# Patient Record
Sex: FEMALE
[De-identification: names, ages, dates, MRNs, and addresses within clinical notes are randomized per-mention and may not be internally consistent; named-entity substitution may affect disease eponyms.]

---
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
## 2023-04-18 PROBLEM — M25.561 RIGHT KNEE PAIN: Status: RESOLVED | Noted: 2023-04-18 | Resolved: 2023-04-18

## 2023-04-18 PROBLEM — S83.511A SPRAIN OF ANTERIOR CRUCIATE LIGAMENT OF RIGHT KNEE: Status: RESOLVED | Noted: 2023-04-18 | Resolved: 2023-04-18

## 2023-04-18 PROBLEM — R10.2 PAIN, PELVIC, FEMALE: Status: ACTIVE | Noted: 2023-04-18

## 2023-04-18 PROBLEM — M25.661 STIFFNESS OF RIGHT KNEE, NOT ELSEWHERE CLASSIFIED: Status: RESOLVED | Noted: 2023-04-18 | Resolved: 2023-04-18

## 2023-04-18 PROBLEM — S83.421A SPRAIN OF LATERAL COLLATERAL LIGAMENT OF RIGHT KNEE: Status: RESOLVED | Noted: 2023-04-18 | Resolved: 2023-04-18

## 2023-04-18 PROBLEM — R20.0 NUMBNESS OF FINGERS: Status: RESOLVED | Noted: 2023-04-18 | Resolved: 2023-04-18

## 2023-04-18 PROBLEM — N92.1 MENORRHAGIA WITH IRREGULAR CYCLE: Status: ACTIVE | Noted: 2023-04-18

## 2023-04-18 PROBLEM — R31.9 HEMATURIA: Status: RESOLVED | Noted: 2023-04-18 | Resolved: 2023-04-18

## 2023-04-18 PROBLEM — R22.9 LUMP OF SKIN: Status: RESOLVED | Noted: 2023-04-18 | Resolved: 2023-04-18

## 2023-04-18 PROBLEM — R10.32 LEFT LOWER QUADRANT ABDOMINAL PAIN: Status: RESOLVED | Noted: 2023-04-18 | Resolved: 2023-04-18

## 2023-04-18 PROBLEM — K58.2 IRRITABLE BOWEL SYNDROME WITH MIXED BOWEL HABITS: Status: ACTIVE | Noted: 2023-04-18

## 2023-04-18 PROBLEM — L02.214 GROIN ABSCESS: Status: RESOLVED | Noted: 2023-04-18 | Resolved: 2023-04-18

## 2023-04-18 PROBLEM — R49.8 OTHER VOICE AND RESONANCE DISORDERS: Status: ACTIVE | Noted: 2023-04-18

## 2023-04-18 PROBLEM — M62.89 HIGH-TONE PELVIC FLOOR DYSFUNCTION: Status: ACTIVE | Noted: 2023-04-18

## 2023-04-18 PROBLEM — S60.222A CONTUSION OF LEFT HAND: Status: RESOLVED | Noted: 2023-04-18 | Resolved: 2023-04-18

## 2023-04-18 PROBLEM — S63.502S: Status: RESOLVED | Noted: 2023-04-18 | Resolved: 2023-04-18

## 2023-04-18 PROBLEM — S00.83XA FACIAL CONTUSION: Status: ACTIVE | Noted: 2023-04-18

## 2023-04-18 PROBLEM — M22.2X9 PATELLOFEMORAL SYNDROME: Status: ACTIVE | Noted: 2023-04-18

## 2023-04-18 PROBLEM — D25.9 FIBROID UTERUS: Status: ACTIVE | Noted: 2023-04-18

## 2023-04-18 PROBLEM — S63.92XS: Status: RESOLVED | Noted: 2023-04-18 | Resolved: 2023-04-18

## 2023-04-18 PROBLEM — G89.18 POST-OP PAIN: Status: RESOLVED | Noted: 2023-04-18 | Resolved: 2023-04-18

## 2023-04-18 PROBLEM — M25.50 JOINT PAIN: Status: ACTIVE | Noted: 2023-04-18

## 2023-04-18 PROBLEM — M23.261 OLD BUCKET HANDLE TEAR OF LATERAL MENISCUS OF RIGHT KNEE: Status: RESOLVED | Noted: 2023-04-18 | Resolved: 2023-04-18

## 2023-04-18 PROBLEM — D64.9 LOW HEMOGLOBIN: Status: ACTIVE | Noted: 2023-04-18

## 2023-04-18 PROBLEM — M25.532 LEFT WRIST PAIN: Status: RESOLVED | Noted: 2023-04-18 | Resolved: 2023-04-18

## 2023-04-18 PROBLEM — B37.9 YEAST INFECTION: Status: RESOLVED | Noted: 2023-04-18 | Resolved: 2023-04-18

## 2023-04-18 RX ORDER — ALBUTEROL SULFATE 0.83 MG/ML
2.5 SOLUTION RESPIRATORY (INHALATION) EVERY 6 HOURS PRN
COMMUNITY

## 2023-04-18 RX ORDER — NORELGESTROMIN AND ETHINYL ESTRADIOL 150; 35 UG/D; UG/D
PATCH TRANSDERMAL
COMMUNITY
Start: 2022-10-18 | End: 2024-05-13 | Stop reason: WASHOUT

## 2023-04-18 RX ORDER — EPINEPHRINE 0.3 MG/.3ML
INJECTION SUBCUTANEOUS
COMMUNITY
Start: 2022-02-28 | End: 2023-05-31 | Stop reason: SDUPTHER

## 2023-04-18 RX ORDER — CHOLECALCIFEROL (VITAMIN D3) 125 MCG
CAPSULE ORAL
COMMUNITY

## 2023-04-18 RX ORDER — MULTIVIT-MIN/IRON/FOLIC ACID/K 18-600-40
CAPSULE ORAL
COMMUNITY

## 2023-04-18 RX ORDER — ASCORBIC ACID 500 MG
1 TABLET ORAL DAILY
COMMUNITY
End: 2023-04-19 | Stop reason: ALTCHOICE

## 2023-04-18 RX ORDER — ECHINACEA 400 MG
CAPSULE ORAL
COMMUNITY

## 2023-04-18 RX ORDER — ZINC SULFATE 50(220)MG
CAPSULE ORAL DAILY
COMMUNITY

## 2023-04-18 RX ORDER — ALBUTEROL SULFATE 90 UG/1
2 AEROSOL, METERED RESPIRATORY (INHALATION) EVERY 6 HOURS
COMMUNITY
End: 2023-09-26 | Stop reason: SDUPTHER

## 2023-04-19 ENCOUNTER — OFFICE VISIT (OUTPATIENT)
Dept: PRIMARY CARE | Facility: CLINIC | Age: 23
End: 2023-04-19
Payer: COMMERCIAL

## 2023-04-19 VITALS
HEART RATE: 101 BPM | BODY MASS INDEX: 19.15 KG/M2 | DIASTOLIC BLOOD PRESSURE: 72 MMHG | HEIGHT: 67 IN | SYSTOLIC BLOOD PRESSURE: 116 MMHG | WEIGHT: 122 LBS

## 2023-04-19 DIAGNOSIS — R53.83 FATIGUE, UNSPECIFIED TYPE: ICD-10-CM

## 2023-04-19 DIAGNOSIS — D64.9 LOW HEMOGLOBIN: ICD-10-CM

## 2023-04-19 DIAGNOSIS — Z13.220 LIPID SCREENING: ICD-10-CM

## 2023-04-19 DIAGNOSIS — R63.1 POLYDIPSIA: Primary | ICD-10-CM

## 2023-04-19 LAB
POC APPEARANCE, URINE: CLEAR
POC BILIRUBIN, URINE: NEGATIVE
POC BLOOD, URINE: NEGATIVE
POC COLOR, URINE: YELLOW
POC GLUCOSE, URINE: NEGATIVE MG/DL
POC KETONES, URINE: NEGATIVE MG/DL
POC LEUKOCYTES, URINE: NEGATIVE
POC NITRITE,URINE: NEGATIVE
POC PH, URINE: 7 PH
POC PROTEIN, URINE: NEGATIVE MG/DL
POC SPECIFIC GRAVITY, URINE: 1.02
POC UROBILINOGEN, URINE: 0.2 EU/DL

## 2023-04-19 PROCEDURE — 99214 OFFICE O/P EST MOD 30 MIN: CPT | Performed by: INTERNAL MEDICINE

## 2023-04-19 PROCEDURE — 81003 URINALYSIS AUTO W/O SCOPE: CPT | Performed by: INTERNAL MEDICINE

## 2023-04-19 PROCEDURE — 1036F TOBACCO NON-USER: CPT | Performed by: INTERNAL MEDICINE

## 2023-04-19 ASSESSMENT — ENCOUNTER SYMPTOMS
EYE DISCHARGE: 0
NECK STIFFNESS: 0
WHEEZING: 0
NUMBNESS: 0
ALLERGIC/IMMUNOLOGIC NEGATIVE: 1
CARDIOVASCULAR NEGATIVE: 1
MUSCULOSKELETAL NEGATIVE: 1
COUGH: 0
VOMITING: 0
LIGHT-HEADEDNESS: 0
NAUSEA: 0
ABDOMINAL PAIN: 0
SHORTNESS OF BREATH: 0
DIARRHEA: 0
ADENOPATHY: 0
PALPITATIONS: 0
CHILLS: 0
HEADACHES: 0
EYES NEGATIVE: 1
CONFUSION: 0
NEUROLOGICAL NEGATIVE: 1
HEMATOLOGIC/LYMPHATIC NEGATIVE: 1
GASTROINTESTINAL NEGATIVE: 1
JOINT SWELLING: 0
BACK PAIN: 0
CONSTITUTIONAL NEGATIVE: 1
FEVER: 0
ENDOCRINE NEGATIVE: 1
ABDOMINAL DISTENTION: 0
RESPIRATORY NEGATIVE: 1
DYSURIA: 0
CONSTIPATION: 0
PSYCHIATRIC NEGATIVE: 1
AGITATION: 0

## 2023-04-19 ASSESSMENT — PATIENT HEALTH QUESTIONNAIRE - PHQ9
1. LITTLE INTEREST OR PLEASURE IN DOING THINGS: NOT AT ALL
2. FEELING DOWN, DEPRESSED OR HOPELESS: NOT AT ALL
SUM OF ALL RESPONSES TO PHQ9 QUESTIONS 1 AND 2: 0

## 2023-04-19 NOTE — PROGRESS NOTES
Subjective   Patient ID: Jessi Alvarado is a 22 y.o. female who presents for Follow-up (PT HERE WANTS TO DISCUSS BEING CHECKED FOR DIABETES).  C/O FREQUENCY, POLYDIPSIA, APPETITE NOT VERY GOOD, CO FATIGUE FOR A FEW YEARS  POSITIVE FH DM2        Review of Systems   Constitutional: Negative.  Negative for chills and fever.   HENT: Negative.  Negative for congestion.    Eyes: Negative.  Negative for discharge.   Respiratory: Negative.  Negative for cough, shortness of breath and wheezing.    Cardiovascular: Negative.  Negative for chest pain, palpitations and leg swelling.   Gastrointestinal: Negative.  Negative for abdominal distention, abdominal pain, constipation, diarrhea, nausea and vomiting.   Endocrine: Negative.    Genitourinary: Negative.  Negative for dysuria and urgency.   Musculoskeletal: Negative.  Negative for back pain, joint swelling and neck stiffness.   Skin: Negative.  Negative for rash.   Allergic/Immunologic: Negative.  Negative for immunocompromised state.   Neurological: Negative.  Negative for light-headedness, numbness and headaches.   Hematological: Negative.  Negative for adenopathy.   Psychiatric/Behavioral: Negative.  Negative for agitation, behavioral problems and confusion.    All other systems reviewed and are negative.      Objective   Physical Exam  Vitals reviewed.   Constitutional:       General: She is not in acute distress.     Appearance: Normal appearance.   HENT:      Head: Normocephalic and atraumatic.      Nose: Nose normal.   Eyes:      Conjunctiva/sclera: Conjunctivae normal.      Pupils: Pupils are equal, round, and reactive to light.   Neck:      Vascular: No carotid bruit.   Cardiovascular:      Rate and Rhythm: Normal rate and regular rhythm.      Pulses: Normal pulses.      Heart sounds:      No gallop.   Pulmonary:      Effort: Pulmonary effort is normal. No respiratory distress.      Breath sounds: Normal breath sounds. No wheezing.   Abdominal:      General:  "Bowel sounds are normal.      Palpations: Abdomen is soft.      Tenderness: There is no abdominal tenderness.   Musculoskeletal:         General: Normal range of motion.      Cervical back: Normal range of motion. No rigidity.   Lymphadenopathy:      Cervical: No cervical adenopathy.   Skin:     General: Skin is warm.      Findings: No rash.   Neurological:      General: No focal deficit present.      Mental Status: She is alert and oriented to person, place, and time.   Psychiatric:         Mood and Affect: Mood normal.         Behavior: Behavior normal.     /72 (BP Location: Right arm, Patient Position: Sitting)   Pulse 101   Ht 1.702 m (5' 7\")   Wt 55.3 kg (122 lb)   BMI 19.11 kg/m²    Hemoglobin A1C   Date/Time Value Ref Range Status   09/08/2022 07:10 AM 5.3 % Final     Comment:          Diagnosis of Diabetes-Adults   Non-Diabetic: < or = 5.6%   Increased risk for developing diabetes: 5.7-6.4%   Diagnostic of diabetes: > or = 6.5%  .       Monitoring of Diabetes                Age (y)     Therapeutic Goal (%)   Adults:          >18           <7.0   Pediatrics:    13-18           <7.5                   7-12           <8.0                   0- 6            7.5-8.5   American Diabetes Association. Diabetes Care 33(S1), Jan 2010.       Assessment/Plan   Problem List Items Addressed This Visit    None  Visit Diagnoses       Polydipsia    -  Primary    Relevant Orders    POCT UA Automated manually resulted    Comprehensive Metabolic Panel    CBC and Auto Differential    Folate    Vitamin B12    Vitamin D, Total    Hemoglobin A1C    TSH with reflex to Free T4 if abnormal    Fatigue, unspecified type        Relevant Orders    POCT UA Automated manually resulted    Comprehensive Metabolic Panel    CBC and Auto Differential    Folate    Vitamin B12    Vitamin D, Total    Hemoglobin A1C    TSH with reflex to Free T4 if abnormal             I have counselled patient about need for smoking/tobacco cessation and " how I can support efforts when patient is ready to quit.  Discussed nicotine replacement therapy, Varenicline, Bupropion, hypnosis, support groups, and acupuncture as potential options.  I spend 4 minutes counseling.    SEE A PSCHY AND COUNS.      UA NEGATIVE    MDM    1) COMPLEXITY: 1 UNDIAGNOSED NEW PROBLEM WITH UNCERTAIN PROGNOSIS  2)DATA: TESTS INTERPRETED AND OR ORDERED, TOOK INDEPENDENT HISTORY OR RECORDS REVIEWED  3)RISK: MODERATE RISK DUE TO NATURE OF MEDICAL CONDITIONS/COMORBIDITY OR MEDICATIONS ORDERED OR SURGICAL OR PROCEDURE REFERRAL, .    FU 2 W BW

## 2023-05-10 ENCOUNTER — APPOINTMENT (OUTPATIENT)
Dept: PRIMARY CARE | Facility: CLINIC | Age: 23
End: 2023-05-10
Payer: COMMERCIAL

## 2023-05-31 ENCOUNTER — OFFICE VISIT (OUTPATIENT)
Dept: PRIMARY CARE | Facility: CLINIC | Age: 23
End: 2023-05-31
Payer: COMMERCIAL

## 2023-05-31 VITALS
HEART RATE: 90 BPM | SYSTOLIC BLOOD PRESSURE: 104 MMHG | OXYGEN SATURATION: 97 % | BODY MASS INDEX: 18.95 KG/M2 | WEIGHT: 121 LBS | DIASTOLIC BLOOD PRESSURE: 69 MMHG

## 2023-05-31 DIAGNOSIS — Z00.00 HEALTH CARE MAINTENANCE: Primary | ICD-10-CM

## 2023-05-31 DIAGNOSIS — R10.32 LLQ PAIN: ICD-10-CM

## 2023-05-31 PROBLEM — J45.20 MILD INTERMITTENT ASTHMA WITHOUT COMPLICATION (HHS-HCC): Status: ACTIVE | Noted: 2020-07-13

## 2023-05-31 PROBLEM — J38.3 VOCAL CORD DYSFUNCTION: Status: ACTIVE | Noted: 2020-07-13

## 2023-05-31 PROBLEM — M25.551 RIGHT HIP PAIN: Status: ACTIVE | Noted: 2018-12-06

## 2023-05-31 PROBLEM — J45.40 MODERATE PERSISTENT ASTHMA WITHOUT COMPLICATION (HHS-HCC): Status: ACTIVE | Noted: 2020-01-13

## 2023-05-31 PROBLEM — M23.91 LOCKING OF RIGHT KNEE: Status: ACTIVE | Noted: 2018-01-04

## 2023-05-31 PROBLEM — F07.81 POST CONCUSSION SYNDROME: Status: ACTIVE | Noted: 2017-12-13

## 2023-05-31 PROBLEM — M54.41 ACUTE RIGHT-SIDED LOW BACK PAIN WITH RIGHT-SIDED SCIATICA: Status: ACTIVE | Noted: 2018-12-06

## 2023-05-31 PROBLEM — R41.89 COGNITIVE CHANGES: Status: ACTIVE | Noted: 2018-05-31

## 2023-05-31 PROBLEM — M25.562 LEFT KNEE PAIN: Status: ACTIVE | Noted: 2017-08-15

## 2023-05-31 LAB
POC APPEARANCE, URINE: CLEAR
POC BILIRUBIN, URINE: NEGATIVE
POC BLOOD, URINE: NEGATIVE
POC COLOR, URINE: YELLOW
POC GLUCOSE, URINE: NEGATIVE MG/DL
POC KETONES, URINE: NEGATIVE MG/DL
POC LEUKOCYTES, URINE: NEGATIVE
POC NITRITE,URINE: NEGATIVE
POC PH, URINE: 7.5 PH
POC PROTEIN, URINE: NEGATIVE MG/DL
POC SPECIFIC GRAVITY, URINE: 1.02
POC UROBILINOGEN, URINE: 0.2 EU/DL
PREGNANCY TEST URINE, POC: NEGATIVE

## 2023-05-31 PROCEDURE — 99215 OFFICE O/P EST HI 40 MIN: CPT | Performed by: INTERNAL MEDICINE

## 2023-05-31 PROCEDURE — 1036F TOBACCO NON-USER: CPT | Performed by: INTERNAL MEDICINE

## 2023-05-31 PROCEDURE — 81025 URINE PREGNANCY TEST: CPT | Performed by: INTERNAL MEDICINE

## 2023-05-31 PROCEDURE — 81003 URINALYSIS AUTO W/O SCOPE: CPT | Performed by: INTERNAL MEDICINE

## 2023-05-31 RX ORDER — EPINEPHRINE 0.3 MG/.3ML
INJECTION SUBCUTANEOUS
Qty: 1 EACH | Refills: 0 | Status: SHIPPED | OUTPATIENT
Start: 2023-05-31

## 2023-05-31 ASSESSMENT — PATIENT HEALTH QUESTIONNAIRE - PHQ9
1. LITTLE INTEREST OR PLEASURE IN DOING THINGS: NOT AT ALL
SUM OF ALL RESPONSES TO PHQ9 QUESTIONS 1 AND 2: 0
2. FEELING DOWN, DEPRESSED OR HOPELESS: NOT AT ALL

## 2023-05-31 ASSESSMENT — ENCOUNTER SYMPTOMS
CHILLS: 0
NUMBNESS: 0
CONSTITUTIONAL NEGATIVE: 1
DYSURIA: 0
NAUSEA: 1
EYES NEGATIVE: 1
PALPITATIONS: 0
EYE DISCHARGE: 0
ALLERGIC/IMMUNOLOGIC NEGATIVE: 1
ABDOMINAL PAIN: 1
BACK PAIN: 0
DIARRHEA: 0
HEMATOLOGIC/LYMPHATIC NEGATIVE: 1
ABDOMINAL DISTENTION: 0
ADENOPATHY: 0
VOMITING: 0
NECK STIFFNESS: 0
AGITATION: 0
RESPIRATORY NEGATIVE: 1
WHEEZING: 0
ENDOCRINE NEGATIVE: 1
HEADACHES: 0
SHORTNESS OF BREATH: 0
COUGH: 0
NEUROLOGICAL NEGATIVE: 1
CONFUSION: 0
PSYCHIATRIC NEGATIVE: 1
MUSCULOSKELETAL NEGATIVE: 1
LIGHT-HEADEDNESS: 0
FEVER: 0
BLOOD IN STOOL: 0
CONSTIPATION: 0
CARDIOVASCULAR NEGATIVE: 1
JOINT SWELLING: 0

## 2023-05-31 NOTE — PROGRESS NOTES
Subjective   Patient ID: Jessi Alvarado is a 22 y.o. female who presents for PAIN IN ABD (X FEW DAYS) and Urinary Frequency.  PT C/O LLQ PAIN, LAST PERIOD LAST MO, THE LAST PATCH WAS LAST , HAS NOT USED FOR THE LAST MO,  FOR 3 DAYS  SEVERITY: MODERATE  CHARACTERISTIC: ACUTE  EXACERBATION FACTOR: NONE  RELIEVING FACTOR: NONE  ASSOCIATED SYMPTOMS: NAUSEA NO CONSTIPATION NO DIARRHEA, NO VAGINAL DISCHARGE OR BLEEEDING  PRIOR TX: IBUPROFEN          Review of Systems   Constitutional: Negative.  Negative for chills and fever.   HENT: Negative.  Negative for congestion.    Eyes: Negative.  Negative for discharge.   Respiratory: Negative.  Negative for cough, shortness of breath and wheezing.    Cardiovascular: Negative.  Negative for chest pain, palpitations and leg swelling.   Gastrointestinal:  Positive for abdominal pain and nausea. Negative for abdominal distention, blood in stool, constipation, diarrhea and vomiting.   Endocrine: Negative.    Genitourinary: Negative.  Negative for dysuria and urgency.   Musculoskeletal: Negative.  Negative for back pain, joint swelling and neck stiffness.   Skin: Negative.  Negative for rash.   Allergic/Immunologic: Negative.  Negative for immunocompromised state.   Neurological: Negative.  Negative for light-headedness, numbness and headaches.   Hematological: Negative.  Negative for adenopathy.   Psychiatric/Behavioral: Negative.  Negative for agitation, behavioral problems and confusion.    All other systems reviewed and are negative.      Objective   Physical Exam  Vitals reviewed.   Constitutional:       General: She is not in acute distress.     Appearance: Normal appearance.   HENT:      Head: Normocephalic and atraumatic.      Nose: Nose normal.   Eyes:      Conjunctiva/sclera: Conjunctivae normal.      Pupils: Pupils are equal, round, and reactive to light.   Neck:      Vascular: No carotid bruit.   Cardiovascular:      Rate and Rhythm: Normal rate and regular rhythm.       Pulses: Normal pulses.      Heart sounds:      No gallop.   Pulmonary:      Effort: Pulmonary effort is normal. No respiratory distress.      Breath sounds: Normal breath sounds. No wheezing.   Abdominal:      General: Bowel sounds are normal.      Palpations: Abdomen is soft.      Tenderness: There is no abdominal tenderness.   Musculoskeletal:         General: Normal range of motion.      Cervical back: Normal range of motion. No rigidity.   Lymphadenopathy:      Cervical: No cervical adenopathy.   Skin:     General: Skin is warm.      Findings: No rash.   Neurological:      General: No focal deficit present.      Mental Status: She is alert and oriented to person, place, and time.   Psychiatric:         Mood and Affect: Mood normal.         Behavior: Behavior normal.       /69 (BP Location: Right arm)   Pulse 90   Wt 54.9 kg (121 lb)   LMP  (LMP Unknown)   SpO2 97%   BMI 18.95 kg/m²    Hemoglobin A1C   Date/Time Value Ref Range Status   09/08/2022 07:10 AM 5.3 % Final     Comment:          Diagnosis of Diabetes-Adults   Non-Diabetic: < or = 5.6%   Increased risk for developing diabetes: 5.7-6.4%   Diagnostic of diabetes: > or = 6.5%  .       Monitoring of Diabetes                Age (y)     Therapeutic Goal (%)   Adults:          >18           <7.0   Pediatrics:    13-18           <7.5                   7-12           <8.0                   0- 6            7.5-8.5   American Diabetes Association. Diabetes Care 33(S1), Jan 2010.       Assessment/Plan   Problem List Items Addressed This Visit    None  Visit Diagnoses       Health care maintenance    -  Primary    Relevant Medications    EPINEPHrine 0.3 mg/0.3 mL injection syringe    LLQ pain        Relevant Orders    POCT UA Automated manually resulted    POCT pregnancy, urine manually resulted             UA NEGATIVE    REFERRED TO er FOR EVAL    Case dw withED physician    MDM    1) COMPLEXITY: 1 ACUTE OR CHRONIC ILLNESS OR INJURY THAT POSES  THREAT TO LIFE OR BODILY FUNCTION.  2)DATA: TESTS INTERPRETED AND OR ORDERED, TOOK INDEPENDENT HISTORY OR RECORDS REVIEWED, CASE DISCUSSED WITH ANOTHER PROVIDER  3)RISK: HIGH RISK DUE TO NATURE OF MEDICAL CONDITIONS/COMORBIDITY OR MEDICATIONS ORDERED OR SURGICAL OR PROCEDURE REFERRAL, OR REFERRED TO HOSPITAL .

## 2023-07-07 LAB
CHLAMYDIA TRACH., AMPLIFIED: NEGATIVE
N. GONORRHEA, AMPLIFIED: NEGATIVE

## 2023-07-26 PROBLEM — S06.0X0A CONCUSSION WITH NO LOSS OF CONSCIOUSNESS: Status: RESOLVED | Noted: 2018-03-02 | Resolved: 2023-07-26

## 2023-07-31 ENCOUNTER — OFFICE VISIT (OUTPATIENT)
Dept: PRIMARY CARE | Facility: CLINIC | Age: 23
End: 2023-07-31
Payer: COMMERCIAL

## 2023-07-31 VITALS
DIASTOLIC BLOOD PRESSURE: 64 MMHG | HEART RATE: 94 BPM | WEIGHT: 118 LBS | HEIGHT: 67 IN | SYSTOLIC BLOOD PRESSURE: 100 MMHG | BODY MASS INDEX: 18.52 KG/M2

## 2023-07-31 DIAGNOSIS — J45.20 MILD INTERMITTENT ASTHMA WITHOUT COMPLICATION (HHS-HCC): Primary | ICD-10-CM

## 2023-07-31 PROCEDURE — 99213 OFFICE O/P EST LOW 20 MIN: CPT | Performed by: INTERNAL MEDICINE

## 2023-07-31 PROCEDURE — 1036F TOBACCO NON-USER: CPT | Performed by: INTERNAL MEDICINE

## 2023-07-31 ASSESSMENT — ENCOUNTER SYMPTOMS
CHILLS: 0
JOINT SWELLING: 0
VOMITING: 0
RESPIRATORY NEGATIVE: 1
NAUSEA: 0
COUGH: 0
NUMBNESS: 0
ADENOPATHY: 0
SHORTNESS OF BREATH: 0
WHEEZING: 0
BACK PAIN: 0
CONSTITUTIONAL NEGATIVE: 1
GASTROINTESTINAL NEGATIVE: 1
DYSURIA: 0
HEMATOLOGIC/LYMPHATIC NEGATIVE: 1
DIARRHEA: 0
NEUROLOGICAL NEGATIVE: 1
EYE DISCHARGE: 0
CONFUSION: 0
LIGHT-HEADEDNESS: 0
CONSTIPATION: 0
PSYCHIATRIC NEGATIVE: 1
NECK STIFFNESS: 0
ABDOMINAL PAIN: 0
HEADACHES: 0
AGITATION: 0
FEVER: 0
EYES NEGATIVE: 1
MUSCULOSKELETAL NEGATIVE: 1
ABDOMINAL DISTENTION: 0
ALLERGIC/IMMUNOLOGIC NEGATIVE: 1
PALPITATIONS: 0
CARDIOVASCULAR NEGATIVE: 1
ENDOCRINE NEGATIVE: 1

## 2023-07-31 NOTE — PROGRESS NOTES
Subjective   Patient ID: Jessi Alvarado is a 23 y.o. female who presents for Follow-up (6 MO FU NO LAB DONE).  FU 6 MO        Review of Systems   Constitutional: Negative.  Negative for chills and fever.   HENT: Negative.  Negative for congestion.    Eyes: Negative.  Negative for discharge.   Respiratory: Negative.  Negative for cough, shortness of breath and wheezing.    Cardiovascular: Negative.  Negative for chest pain, palpitations and leg swelling.   Gastrointestinal: Negative.  Negative for abdominal distention, abdominal pain, constipation, diarrhea, nausea and vomiting.   Endocrine: Negative.    Genitourinary: Negative.  Negative for dysuria and urgency.   Musculoskeletal: Negative.  Negative for back pain, joint swelling and neck stiffness.   Skin: Negative.  Negative for rash.   Allergic/Immunologic: Negative.  Negative for immunocompromised state.   Neurological: Negative.  Negative for light-headedness, numbness and headaches.   Hematological: Negative.  Negative for adenopathy.   Psychiatric/Behavioral: Negative.  Negative for agitation, behavioral problems and confusion.    All other systems reviewed and are negative.      Objective   Physical Exam  Vitals reviewed.   Constitutional:       General: She is not in acute distress.     Appearance: Normal appearance.   HENT:      Head: Normocephalic and atraumatic.      Nose: Nose normal.   Eyes:      Conjunctiva/sclera: Conjunctivae normal.      Pupils: Pupils are equal, round, and reactive to light.   Neck:      Vascular: No carotid bruit.   Cardiovascular:      Rate and Rhythm: Normal rate and regular rhythm.      Pulses: Normal pulses.      Heart sounds:      No gallop.   Pulmonary:      Effort: Pulmonary effort is normal. No respiratory distress.      Breath sounds: Normal breath sounds. No wheezing.   Abdominal:      General: Bowel sounds are normal.      Palpations: Abdomen is soft.      Tenderness: There is no abdominal tenderness.  "  Musculoskeletal:         General: Normal range of motion.      Cervical back: Normal range of motion. No rigidity.   Lymphadenopathy:      Cervical: No cervical adenopathy.   Skin:     General: Skin is warm.      Findings: No rash.   Neurological:      General: No focal deficit present.      Mental Status: She is alert and oriented to person, place, and time.   Psychiatric:         Mood and Affect: Mood normal.         Behavior: Behavior normal.       /64 (BP Location: Left arm, Patient Position: Sitting)   Pulse 94   Ht 1.702 m (5' 7\")   Wt 53.5 kg (118 lb)   BMI 18.48 kg/m²    Hemoglobin A1C   Date/Time Value Ref Range Status   09/08/2022 07:10 AM 5.3 % Final     Comment:          Diagnosis of Diabetes-Adults   Non-Diabetic: < or = 5.6%   Increased risk for developing diabetes: 5.7-6.4%   Diagnostic of diabetes: > or = 6.5%  .       Monitoring of Diabetes                Age (y)     Therapeutic Goal (%)   Adults:          >18           <7.0   Pediatrics:    13-18           <7.5                   7-12           <8.0                   0- 6            7.5-8.5   American Diabetes Association. Diabetes Care 33(S1), Jan 2010.       Assessment/Plan   Problem List Items Addressed This Visit       Mild intermittent asthma without complication - Primary    Relevant Orders    CBC and Auto Differential    Comprehensive Metabolic Panel        CLINICALLY DOING FINE    FU 6 MO BW  "

## 2023-08-02 ENCOUNTER — APPOINTMENT (OUTPATIENT)
Dept: PRIMARY CARE | Facility: CLINIC | Age: 23
End: 2023-08-02
Payer: COMMERCIAL

## 2023-09-08 ENCOUNTER — LAB (OUTPATIENT)
Dept: LAB | Facility: LAB | Age: 23
End: 2023-09-08
Payer: COMMERCIAL

## 2023-09-08 DIAGNOSIS — R53.83 FATIGUE, UNSPECIFIED TYPE: ICD-10-CM

## 2023-09-08 DIAGNOSIS — Z13.220 LIPID SCREENING: ICD-10-CM

## 2023-09-08 DIAGNOSIS — R63.1 POLYDIPSIA: ICD-10-CM

## 2023-09-08 DIAGNOSIS — J45.20 MILD INTERMITTENT ASTHMA WITHOUT COMPLICATION (HHS-HCC): ICD-10-CM

## 2023-09-08 DIAGNOSIS — D64.9 LOW HEMOGLOBIN: ICD-10-CM

## 2023-09-08 LAB
ALANINE AMINOTRANSFERASE (SGPT) (U/L) IN SER/PLAS: 9 U/L (ref 7–45)
ALBUMIN (G/DL) IN SER/PLAS: 4.6 G/DL (ref 3.4–5)
ALKALINE PHOSPHATASE (U/L) IN SER/PLAS: 42 U/L (ref 33–110)
ANION GAP IN SER/PLAS: 11 MMOL/L (ref 10–20)
ASPARTATE AMINOTRANSFERASE (SGOT) (U/L) IN SER/PLAS: 14 U/L (ref 9–39)
BASOPHILS (10*3/UL) IN BLOOD BY AUTOMATED COUNT: 0.01 X10E9/L (ref 0–0.1)
BASOPHILS/100 LEUKOCYTES IN BLOOD BY AUTOMATED COUNT: 0.2 % (ref 0–2)
BILIRUBIN TOTAL (MG/DL) IN SER/PLAS: 0.8 MG/DL (ref 0–1.2)
CALCIDIOL (25 OH VITAMIN D3) (NG/ML) IN SER/PLAS: 21 NG/ML
CALCIUM (MG/DL) IN SER/PLAS: 9.4 MG/DL (ref 8.6–10.3)
CARBON DIOXIDE, TOTAL (MMOL/L) IN SER/PLAS: 25 MMOL/L (ref 21–32)
CHLAMYDIA TRACH., AMPLIFIED: NEGATIVE
CHLORIDE (MMOL/L) IN SER/PLAS: 108 MMOL/L (ref 98–107)
CHOLESTEROL (MG/DL) IN SER/PLAS: 123 MG/DL (ref 0–199)
CHOLESTEROL IN HDL (MG/DL) IN SER/PLAS: 57 MG/DL
CHOLESTEROL/HDL RATIO: 2.2
COBALAMIN (VITAMIN B12) (PG/ML) IN SER/PLAS: 538 PG/ML (ref 211–911)
CREATININE (MG/DL) IN SER/PLAS: 0.74 MG/DL (ref 0.5–1.05)
EOSINOPHILS (10*3/UL) IN BLOOD BY AUTOMATED COUNT: 0.04 X10E9/L (ref 0–0.7)
EOSINOPHILS/100 LEUKOCYTES IN BLOOD BY AUTOMATED COUNT: 0.9 % (ref 0–6)
ERYTHROCYTE DISTRIBUTION WIDTH (RATIO) BY AUTOMATED COUNT: 11.6 % (ref 11.5–14.5)
ERYTHROCYTE MEAN CORPUSCULAR HEMOGLOBIN CONCENTRATION (G/DL) BY AUTOMATED: 33.1 G/DL (ref 32–36)
ERYTHROCYTE MEAN CORPUSCULAR VOLUME (FL) BY AUTOMATED COUNT: 94 FL (ref 80–100)
ERYTHROCYTES (10*6/UL) IN BLOOD BY AUTOMATED COUNT: 4.18 X10E12/L (ref 4–5.2)
ESTIMATED AVERAGE GLUCOSE FOR HBA1C: 108 MG/DL
FERRITIN (UG/LL) IN SER/PLAS: 64 UG/L (ref 8–150)
FOLATE (NG/ML) IN SER/PLAS: 10.3 NG/ML
GFR FEMALE: >90 ML/MIN/1.73M2
GLUCOSE (MG/DL) IN SER/PLAS: 83 MG/DL (ref 74–99)
HEMATOCRIT (%) IN BLOOD BY AUTOMATED COUNT: 39.3 % (ref 36–46)
HEMOGLOBIN (G/DL) IN BLOOD: 13 G/DL (ref 12–16)
HEMOGLOBIN A1C/HEMOGLOBIN TOTAL IN BLOOD: 5.4 %
IMMATURE GRANULOCYTES/100 LEUKOCYTES IN BLOOD BY AUTOMATED COUNT: 0 % (ref 0–0.9)
LDL: 58 MG/DL (ref 0–119)
LEUKOCYTES (10*3/UL) IN BLOOD BY AUTOMATED COUNT: 4.6 X10E9/L (ref 4.4–11.3)
LYMPHOCYTES (10*3/UL) IN BLOOD BY AUTOMATED COUNT: 1.39 X10E9/L (ref 1.2–4.8)
LYMPHOCYTES/100 LEUKOCYTES IN BLOOD BY AUTOMATED COUNT: 30.1 % (ref 13–44)
MONOCYTES (10*3/UL) IN BLOOD BY AUTOMATED COUNT: 0.38 X10E9/L (ref 0.1–1)
MONOCYTES/100 LEUKOCYTES IN BLOOD BY AUTOMATED COUNT: 8.2 % (ref 2–10)
N. GONORRHEA, AMPLIFIED: NEGATIVE
NEUTROPHILS (10*3/UL) IN BLOOD BY AUTOMATED COUNT: 2.8 X10E9/L (ref 1.2–7.7)
NEUTROPHILS/100 LEUKOCYTES IN BLOOD BY AUTOMATED COUNT: 60.6 % (ref 40–80)
NON HDL CHOLESTEROL: 66 MG/DL (ref 0–149)
PLATELETS (10*3/UL) IN BLOOD AUTOMATED COUNT: 223 X10E9/L (ref 150–450)
POTASSIUM (MMOL/L) IN SER/PLAS: 3.8 MMOL/L (ref 3.5–5.3)
PROTEIN TOTAL: 6.7 G/DL (ref 6.4–8.2)
SODIUM (MMOL/L) IN SER/PLAS: 140 MMOL/L (ref 136–145)
THYROTROPIN (MIU/L) IN SER/PLAS BY DETECTION LIMIT <= 0.05 MIU/L: 2.06 MIU/L (ref 0.44–3.98)
TRIGLYCERIDE (MG/DL) IN SER/PLAS: 39 MG/DL (ref 0–149)
UREA NITROGEN (MG/DL) IN SER/PLAS: 9 MG/DL (ref 6–23)
VLDL: 8 MG/DL (ref 0–40)

## 2023-09-08 PROCEDURE — 85025 COMPLETE CBC W/AUTO DIFF WBC: CPT

## 2023-09-08 PROCEDURE — 82746 ASSAY OF FOLIC ACID SERUM: CPT

## 2023-09-08 PROCEDURE — 82306 VITAMIN D 25 HYDROXY: CPT

## 2023-09-08 PROCEDURE — 80061 LIPID PANEL: CPT

## 2023-09-08 PROCEDURE — 80053 COMPREHEN METABOLIC PANEL: CPT

## 2023-09-08 PROCEDURE — 82728 ASSAY OF FERRITIN: CPT

## 2023-09-08 PROCEDURE — 83036 HEMOGLOBIN GLYCOSYLATED A1C: CPT

## 2023-09-08 PROCEDURE — 84443 ASSAY THYROID STIM HORMONE: CPT

## 2023-09-08 PROCEDURE — 36415 COLL VENOUS BLD VENIPUNCTURE: CPT

## 2023-09-08 PROCEDURE — 82607 VITAMIN B-12: CPT

## 2023-09-09 LAB
HIV 1/ 2 AG/AB SCREEN: NONREACTIVE
SYPHILIS TOTAL AB: NONREACTIVE

## 2023-09-26 ENCOUNTER — TELEPHONE (OUTPATIENT)
Dept: PRIMARY CARE | Facility: CLINIC | Age: 23
End: 2023-09-26

## 2023-09-26 ENCOUNTER — OFFICE VISIT (OUTPATIENT)
Dept: PRIMARY CARE | Facility: CLINIC | Age: 23
End: 2023-09-26
Payer: COMMERCIAL

## 2023-09-26 VITALS
DIASTOLIC BLOOD PRESSURE: 48 MMHG | BODY MASS INDEX: 18.36 KG/M2 | SYSTOLIC BLOOD PRESSURE: 108 MMHG | WEIGHT: 117 LBS | HEIGHT: 67 IN | HEART RATE: 84 BPM

## 2023-09-26 DIAGNOSIS — R10.12 ABDOMINAL PAIN, LUQ: ICD-10-CM

## 2023-09-26 DIAGNOSIS — R10.33 ABDOMINAL PAIN, PERIUMBILICAL: ICD-10-CM

## 2023-09-26 DIAGNOSIS — R10.13 ABDOMINAL PAIN, EPIGASTRIC: Primary | ICD-10-CM

## 2023-09-26 DIAGNOSIS — E55.9 VITAMIN D DEFICIENCY: ICD-10-CM

## 2023-09-26 DIAGNOSIS — R53.82 CHRONIC FATIGUE: ICD-10-CM

## 2023-09-26 DIAGNOSIS — J45.20 MILD INTERMITTENT ASTHMA, UNSPECIFIED WHETHER COMPLICATED (HHS-HCC): ICD-10-CM

## 2023-09-26 DIAGNOSIS — K52.9 CHRONIC DIARRHEA: ICD-10-CM

## 2023-09-26 DIAGNOSIS — E87.6 HYPOKALEMIA: ICD-10-CM

## 2023-09-26 PROBLEM — S83.289A ACUTE LATERAL MENISCAL TEAR: Status: ACTIVE | Noted: 2023-09-26

## 2023-09-26 PROBLEM — I45.10 INCOMPLETE RBBB: Status: ACTIVE | Noted: 2023-09-26

## 2023-09-26 PROBLEM — S62.111A CLOSED CHIP FRACTURE OF TRIQUETRAL BONE OF RIGHT WRIST: Status: ACTIVE | Noted: 2023-09-26

## 2023-09-26 PROBLEM — R30.0 DYSURIA: Status: ACTIVE | Noted: 2023-09-26

## 2023-09-26 PROBLEM — R93.5 ABNORMAL CT SCAN, PELVIS: Status: ACTIVE | Noted: 2023-09-26

## 2023-09-26 PROBLEM — L70.0 ACNE VULGARIS: Status: ACTIVE | Noted: 2018-11-29

## 2023-09-26 PROBLEM — I51.7 LEFT ATRIAL ENLARGEMENT: Status: ACTIVE | Noted: 2023-09-26

## 2023-09-26 PROBLEM — M25.522 LEFT ELBOW PAIN: Status: ACTIVE | Noted: 2023-09-26

## 2023-09-26 PROBLEM — I34.1 MVP (MITRAL VALVE PROLAPSE): Status: ACTIVE | Noted: 2023-09-26

## 2023-09-26 PROBLEM — M79.603 ARM PAIN: Status: ACTIVE | Noted: 2023-09-26

## 2023-09-26 PROCEDURE — 1036F TOBACCO NON-USER: CPT | Performed by: INTERNAL MEDICINE

## 2023-09-26 PROCEDURE — 99214 OFFICE O/P EST MOD 30 MIN: CPT | Performed by: INTERNAL MEDICINE

## 2023-09-26 RX ORDER — AZITHROMYCIN 250 MG/1
TABLET, FILM COATED ORAL
COMMUNITY
Start: 2023-08-01 | End: 2023-09-26 | Stop reason: ALTCHOICE

## 2023-09-26 RX ORDER — AMOXICILLIN AND CLAVULANATE POTASSIUM 875; 125 MG/1; MG/1
TABLET, FILM COATED ORAL
COMMUNITY
Start: 2023-09-22 | End: 2023-10-19 | Stop reason: ALTCHOICE

## 2023-09-26 RX ORDER — DICLOFENAC SODIUM 100 MG/1
1 TABLET, EXTENDED RELEASE ORAL DAILY
COMMUNITY
Start: 2023-05-02 | End: 2023-11-14 | Stop reason: ALTCHOICE

## 2023-09-26 RX ORDER — OMEPRAZOLE 40 MG/1
40 CAPSULE, DELAYED RELEASE ORAL
Qty: 30 CAPSULE | Refills: 5 | Status: SHIPPED | OUTPATIENT
Start: 2023-09-26 | End: 2023-11-14 | Stop reason: ALTCHOICE

## 2023-09-26 RX ORDER — OXYCODONE AND ACETAMINOPHEN 5; 325 MG/1; MG/1
TABLET ORAL
COMMUNITY
Start: 2023-08-27 | End: 2023-09-26 | Stop reason: ALTCHOICE

## 2023-09-26 RX ORDER — NITROFURANTOIN 25; 75 MG/1; MG/1
CAPSULE ORAL
COMMUNITY
Start: 2023-08-27 | End: 2023-09-26 | Stop reason: ALTCHOICE

## 2023-09-26 RX ORDER — MINOCYCLINE HYDROCHLORIDE 50 MG/1
CAPSULE ORAL
COMMUNITY
Start: 2018-11-29 | End: 2023-11-14 | Stop reason: ALTCHOICE

## 2023-09-26 RX ORDER — FAMOTIDINE 20 MG/1
20 TABLET, FILM COATED ORAL 2 TIMES DAILY
Qty: 60 TABLET | Refills: 5 | Status: SHIPPED | OUTPATIENT
Start: 2023-09-26 | End: 2024-03-24

## 2023-09-26 RX ORDER — ONDANSETRON 4 MG/1
TABLET, ORALLY DISINTEGRATING ORAL
COMMUNITY
Start: 2023-09-24 | End: 2024-01-03

## 2023-09-26 RX ORDER — ALBUTEROL SULFATE 90 UG/1
2 AEROSOL, METERED RESPIRATORY (INHALATION) EVERY 4 HOURS PRN
Qty: 18 G | Refills: 11 | Status: SHIPPED | OUTPATIENT
Start: 2023-09-26 | End: 2024-02-15 | Stop reason: SDUPTHER

## 2023-09-26 RX ORDER — FAMOTIDINE 20 MG/1
20 TABLET, FILM COATED ORAL 2 TIMES DAILY
Qty: 60 TABLET | Refills: 5 | Status: SHIPPED | OUTPATIENT
Start: 2023-09-26 | End: 2023-09-26 | Stop reason: SDUPTHER

## 2023-09-26 RX ORDER — IBUPROFEN 400 MG/1
TABLET ORAL
COMMUNITY
Start: 2023-08-12 | End: 2023-09-26 | Stop reason: ALTCHOICE

## 2023-09-26 RX ORDER — OMEPRAZOLE 40 MG/1
40 CAPSULE, DELAYED RELEASE ORAL
Qty: 30 CAPSULE | Refills: 0 | Status: SHIPPED | OUTPATIENT
Start: 2023-09-26 | End: 2023-09-26 | Stop reason: SDUPTHER

## 2023-09-26 RX ORDER — NEOMYCIN SULFATE, POLYMYXIN B SULFATE, HYDROCORTISONE 3.5; 10000; 1 MG/ML; [USP'U]/ML; MG/ML
SOLUTION/ DROPS AURICULAR (OTIC)
COMMUNITY
Start: 2023-08-12 | End: 2024-05-13 | Stop reason: WASHOUT

## 2023-09-26 RX ORDER — ERGOCALCIFEROL 1.25 MG/1
50000 CAPSULE ORAL
Qty: 4 CAPSULE | Refills: 11 | Status: SHIPPED | OUTPATIENT
Start: 2023-09-26 | End: 2024-01-18 | Stop reason: WASHOUT

## 2023-09-26 ASSESSMENT — ENCOUNTER SYMPTOMS
CARDIOVASCULAR NEGATIVE: 1
NAUSEA: 0
NEUROLOGICAL NEGATIVE: 1
ABDOMINAL PAIN: 1
FEVER: 0
PALPITATIONS: 0
WEAKNESS: 0
DYSURIA: 0
LIGHT-HEADEDNESS: 0
DIARRHEA: 1
CHILLS: 0
FATIGUE: 1
MUSCULOSKELETAL NEGATIVE: 1
EYES NEGATIVE: 1
VOMITING: 0
ENDOCRINE NEGATIVE: 1
HEADACHES: 0
BACK PAIN: 0
SHORTNESS OF BREATH: 0
CONSTIPATION: 0
RHINORRHEA: 0
ABDOMINAL DISTENTION: 0
PSYCHIATRIC NEGATIVE: 1
AGITATION: 0
WHEEZING: 0
DIZZINESS: 0
COUGH: 0

## 2023-09-26 NOTE — PROGRESS NOTES
Subjective   Patient ID: Jessi Alvarado is a 23 y.o. female who presents for Follow-up (Er low potassium high glucose).  HPI  LAB F/U, MED REFILL.  F/U AFTER ER VISIT FOR ABDOMINAL PAIN AND CHRONIC DIARRHEA WHICH IS GETTING WORSE. HAD MULTIPLE ER VISITS RECENTLY. HAD CT  OF ABDOMEN AND LABS DONE AT RECENT ER VISIT. STILL HAS THE ABDOMINAL PAIN (EPIGASTRIC AND LUQ) 6/10 WITH CHRONIC DIARRHEA. CHANGED THE DIET TO GLUTEN FREE RECENTLY. CHRONIC FATIGUE.  Review of Systems   Constitutional:  Positive for fatigue. Negative for chills and fever.   HENT: Negative.  Negative for congestion, postnasal drip and rhinorrhea.    Eyes: Negative.  Negative for visual disturbance.   Respiratory:  Negative for cough, shortness of breath and wheezing.    Cardiovascular: Negative.  Negative for chest pain, palpitations and leg swelling.   Gastrointestinal:  Positive for abdominal pain and diarrhea. Negative for abdominal distention, constipation, nausea and vomiting.   Endocrine: Negative.    Genitourinary:  Negative for dysuria and urgency.   Musculoskeletal: Negative.  Negative for back pain.   Skin: Negative.  Negative for rash.   Allergic/Immunologic: Negative for immunocompromised state.   Neurological: Negative.  Negative for dizziness, weakness, light-headedness and headaches.   Psychiatric/Behavioral: Negative.  Negative for agitation.        Objective   Physical Exam  Constitutional:       General: She is not in acute distress.  HENT:      Head: Normocephalic.      Nose: Nose normal.      Mouth/Throat:      Mouth: Mucous membranes are moist.   Eyes:      Conjunctiva/sclera: Conjunctivae normal.      Pupils: Pupils are equal, round, and reactive to light.   Cardiovascular:      Rate and Rhythm: Normal rate and regular rhythm.      Pulses: Normal pulses.      Heart sounds: Normal heart sounds.   Pulmonary:      Effort: No respiratory distress.      Breath sounds: No wheezing.   Chest:      Chest wall: No tenderness.    Abdominal:      General: Abdomen is flat. Bowel sounds are normal.      Palpations: Abdomen is soft.      Tenderness: There is abdominal tenderness.      Comments: EPIGASTRIC, LUQ AND PERIUMBILICAL TENDERNESS   Musculoskeletal:         General: No tenderness. Normal range of motion.      Cervical back: Normal range of motion.   Lymphadenopathy:      Cervical: No cervical adenopathy.   Skin:     General: Skin is warm and dry.      Findings: No rash.   Neurological:      General: No focal deficit present.      Mental Status: She is alert. Mental status is at baseline.   Psychiatric:         Mood and Affect: Mood normal.         Behavior: Behavior normal.         Assessment/Plan   1. Abdominal pain, epigastric  famotidine (Pepcid) 20 mg tablet    omeprazole (PriLOSEC) 40 mg DR capsule    EGD    Celiac Panel    DISCONTINUED: omeprazole (PriLOSEC) 40 mg DR capsule    DISCONTINUED: famotidine (Pepcid) 20 mg tablet      2. Chronic diarrhea  Colonoscopy Screening    Celiac Panel      3. Abdominal pain, LUQ  EGD      4. Abdominal pain, periumbilical  EGD    Celiac Panel      5. Mild intermittent asthma, unspecified whether complicated  albuterol 90 mcg/actuation inhaler      6. Vitamin D deficiency  ergocalciferol (Vitamin D-2) 1.25 MG (76664 UT) capsule      7. Hypokalemia        8. Chronic fatigue          TEST RESULTS WERE DISCUSSED.  Stop smoking and chewing tobacco.  Lose weight.  Do not overeat. Eat small portions at meals and snacks.  Avoid tight clothing and tight-fitting belts. Do not lie down or bend over within the first 15-30 minutes after eating.  Do not chew gum or suck on hard candy. Swallowing air with chewing gum and sucking on hard candy can cause belching and reflux.  Raise the head of your bed 6-8 inches.  Do not eat/drink: chocolate, tomatoes, tomato sauces, oranges, pineapple, grapefruit, mints, coffee, alcohol, carbonated beverages, and black pepper.  Eat a low fat diet. Fatty and greasy foods cause  your stomach to produce more acid.  ADVISED TO HAVE HIGH K DIET AND TO CONTINUE THE GLUTEN FREE DIET.  ADVISED TO ADD OTC VIT D 1000 UNIT 2 TABS DAILY TO VIT D PRESCRIPTION.  MDM    1) COMPLEXITY: 1 UNDIAGNOSED NEW PROBLEM WITH UNCERTAIN PROGNOSIS  2)DATA: TESTS INTERPRETED AND OR ORDERED, TOOK INDEPENDENT HISTORY OR RECORDS REVIEWED  3)RISK: MODERATE RISK DUE TO NATURE OF MEDICAL CONDITIONS/COMORBIDITY OR MEDICATIONS ORDERED OR SURGICAL OR PROCEDURE REFERRAL, .       3 WEEKS WITH PCP.

## 2023-09-26 NOTE — TELEPHONE ENCOUNTER
SCHEDULED FOR COLONOSCOPY/EGD ON 10/12/23. PLEASE MAKE SURE THIS GETS SCHEDULED IN EPIC AND E-RESERVATION CREATED. PREP INSTRUCTIONS GIVEN TO PATIENT WITH DATE OF PROCEDURE.

## 2023-10-02 ENCOUNTER — LAB (OUTPATIENT)
Dept: LAB | Facility: LAB | Age: 23
End: 2023-10-02
Payer: COMMERCIAL

## 2023-10-02 DIAGNOSIS — R10.33 ABDOMINAL PAIN, PERIUMBILICAL: ICD-10-CM

## 2023-10-02 DIAGNOSIS — K52.9 CHRONIC DIARRHEA: ICD-10-CM

## 2023-10-02 DIAGNOSIS — R10.13 ABDOMINAL PAIN, EPIGASTRIC: ICD-10-CM

## 2023-10-02 LAB
GLIADIN PEPTIDE IGA SER IA-ACNC: <1 U/ML
GLIADIN PEPTIDE IGG SER IA-ACNC: <1 U/ML
TTG IGA SER IA-ACNC: <1 U/ML
TTG IGG SER IA-ACNC: <1 U/ML

## 2023-10-02 PROCEDURE — 36415 COLL VENOUS BLD VENIPUNCTURE: CPT

## 2023-10-12 ENCOUNTER — APPOINTMENT (OUTPATIENT)
Dept: GASTROENTEROLOGY | Facility: CLINIC | Age: 23
End: 2023-10-12
Payer: COMMERCIAL

## 2023-10-12 ENCOUNTER — TELEPHONE (OUTPATIENT)
Dept: PRIMARY CARE | Facility: CLINIC | Age: 23
End: 2023-10-12
Payer: COMMERCIAL

## 2023-10-18 ENCOUNTER — OFFICE VISIT (OUTPATIENT)
Dept: ORTHOPEDIC SURGERY | Facility: CLINIC | Age: 23
End: 2023-10-18
Payer: COMMERCIAL

## 2023-10-18 DIAGNOSIS — S62.111D CLOSED CHIP FRACTURE OF RIGHT TRIQUETRUM WITH ROUTINE HEALING, SUBSEQUENT ENCOUNTER: Primary | ICD-10-CM

## 2023-10-18 PROCEDURE — 99213 OFFICE O/P EST LOW 20 MIN: CPT | Performed by: NURSE PRACTITIONER

## 2023-10-18 PROCEDURE — 1036F TOBACCO NON-USER: CPT | Performed by: NURSE PRACTITIONER

## 2023-10-18 RX ORDER — BLOOD-GLUCOSE SENSOR
EACH MISCELLANEOUS
COMMUNITY
Start: 2023-10-09 | End: 2024-04-30 | Stop reason: ALTCHOICE

## 2023-10-18 ASSESSMENT — PAIN DESCRIPTION - DESCRIPTORS: DESCRIPTORS: ACHING

## 2023-10-18 ASSESSMENT — PAIN SCALES - GENERAL: PAINLEVEL_OUTOF10: 3

## 2023-10-18 ASSESSMENT — PAIN - FUNCTIONAL ASSESSMENT: PAIN_FUNCTIONAL_ASSESSMENT: 0-10

## 2023-10-18 NOTE — PROGRESS NOTES
Subjective    Patient ID: Jessi Major is a 23 y.o. female.    Chief Complaint: Follow-up of the Right Wrist (Truquetral Chip Fracture)         Right Wrist       Jessi is a pleasant 23-year-old female presenting today for follow-up visit of wrist injury from 8/27/2023 with possible triquetral fracture.  Patient is no longer using bracing, still gets some pain with motion.  Patient is working full duty and is able to modify activities.  Patient does note some disc comfort with certain range of motion and lifting tasks.  Patient has not been able to attend any OT sessions due to scheduling conflicts.  Patient does use IcyHot, ice, ibuprofen and Tylenol with some symptom    Review of Systems   Constitutional: Negative.    HENT: Negative.     Respiratory: Negative.     Cardiovascular: Negative.    Endocrine: Negative.    Musculoskeletal:  Positive for arthralgias.   Skin: Negative.    Neurological: Negative.    Hematological: Negative.    Psychiatric/Behavioral: Negative.        Objective   Right Hand Exam     Tenderness   Right hand tenderness location: Patient with mild tenderness range of motion and palpation of ulnar styloid region.    Range of Motion   Wrist   Extension:  normal   Flexion:  normal   Pronation:  normal   Supination:  normal     Muscle Strength   Wrist extension: 4/5   Wrist flexion: 5/5   : 5/5     Tests   Phalen’s Sign: negative  Tinel's sign (median nerve): negative  Finkelstein's test: negative    Other   Erythema: absent  Sensation: normal  Pulse: present    Comments:  Symptom aggravation noted with full range of motion in all direction, most specifically ulnar deviation especially against resistance and extension.  Full range of motion of distal joints with distal motor sensor intact, cap            Image Results:  XR wrist  Narrative: Interpreted By:  BESS GRANT MD  MRN: [id number]  Patient Name: JESSI MAJOR     STUDY:  WRIST COMPLT; MIN 3 VIEWS; Right;  9/27/2023 7:47 am      INDICATION:  s/p triquetral fx  S62.111A: Closed chip fracture of triquetrum of  right wrist, initial encounter.     COMPARISON:  09/14/2023     ACCESSION NUMBER(S):  [id number]     ORDERING CLINICIAN:  NICOLASA GARCIA     FINDINGS:  RIGHT WRIST-AP, LATERAL AND OBLIQUE VIEWS  A definite triquetral fracture is not seen. Incidentally noted is  slight widening of the distal radioulnar joint on the PA view  compared to the previous examination, measuring about 2 mm in size.  This may be related to positioning if the patient is asymptomatic  with regard to the DRUJ. No fracture in the distal radius or ulna.  The ulnar styloid process is intact.     Impression: Definite triquetral fracture not seen. No callus.  Widening of the DRUJ.        MACRO:  None    We reviewed previous x-ray and radiology findings on date of visit.  Assessment/Plan   Encounter Diagnoses:  Closed chip fracture of triquetral bone of right wrist  Reviewed symptom control with ice, ibuprofen and Tylenol per package directions.  Patient may use topical pain relief per package directions.  Patient is able to modify her work activities and declines note.  We discussed avoiding aggravating factors.  I did provide the patient with home exercises for wrist and hand rehab.  I highly encouraged the patient to contact our OT department to attempt to schedule for guided thinning program.  Plan will be to follow-up here in 3 to 4 weeks, sooner for changes or concerns.  She is in agreement with plan of care.  This note was generated using Dragon software.  It may contain errors in wording, punctuation or spelling.

## 2023-10-19 ASSESSMENT — ENCOUNTER SYMPTOMS
HEMATOLOGIC/LYMPHATIC NEGATIVE: 1
CARDIOVASCULAR NEGATIVE: 1
ENDOCRINE NEGATIVE: 1
RESPIRATORY NEGATIVE: 1
ARTHRALGIAS: 1
NEUROLOGICAL NEGATIVE: 1
PSYCHIATRIC NEGATIVE: 1
CONSTITUTIONAL NEGATIVE: 1

## 2023-10-19 NOTE — ASSESSMENT & PLAN NOTE
Reviewed symptom control with ice, ibuprofen and Tylenol per package directions.  Patient may use topical pain relief per package directions.  Patient is able to modify her work activities and declines note.  We discussed avoiding aggravating factors.  I did provide the patient with home exercises for wrist and hand rehab.  I highly encouraged the patient to contact our OT department to attempt to schedule for guided thinning program.  Plan will be to follow-up here in 3 to 4 weeks, sooner for changes or concerns.  She is in agreement with plan of care.  This note was generated using Dragon software.  It may contain errors in wording, punctuation or spelling.

## 2023-10-23 ENCOUNTER — APPOINTMENT (OUTPATIENT)
Dept: PRIMARY CARE | Facility: CLINIC | Age: 23
End: 2023-10-23
Payer: COMMERCIAL

## 2023-10-24 ENCOUNTER — HOSPITAL ENCOUNTER (EMERGENCY)
Facility: HOSPITAL | Age: 23
Discharge: HOME | End: 2023-10-25
Attending: EMERGENCY MEDICINE
Payer: COMMERCIAL

## 2023-10-24 DIAGNOSIS — R10.12 LEFT UPPER QUADRANT ABDOMINAL PAIN: Primary | ICD-10-CM

## 2023-10-24 PROCEDURE — 99283 EMERGENCY DEPT VISIT LOW MDM: CPT

## 2023-10-24 PROCEDURE — 99284 EMERGENCY DEPT VISIT MOD MDM: CPT | Performed by: EMERGENCY MEDICINE

## 2023-10-24 ASSESSMENT — COLUMBIA-SUICIDE SEVERITY RATING SCALE - C-SSRS
1. IN THE PAST MONTH, HAVE YOU WISHED YOU WERE DEAD OR WISHED YOU COULD GO TO SLEEP AND NOT WAKE UP?: NO
6. HAVE YOU EVER DONE ANYTHING, STARTED TO DO ANYTHING, OR PREPARED TO DO ANYTHING TO END YOUR LIFE?: NO
2. HAVE YOU ACTUALLY HAD ANY THOUGHTS OF KILLING YOURSELF?: NO

## 2023-10-24 ASSESSMENT — PAIN DESCRIPTION - LOCATION: LOCATION: ABDOMEN

## 2023-10-24 ASSESSMENT — PAIN DESCRIPTION - PAIN TYPE: TYPE: ACUTE PAIN

## 2023-10-24 ASSESSMENT — PAIN DESCRIPTION - DESCRIPTORS
DESCRIPTORS: ACHING
DESCRIPTORS: ACHING

## 2023-10-24 ASSESSMENT — PAIN DESCRIPTION - FREQUENCY: FREQUENCY: CONSTANT/CONTINUOUS

## 2023-10-24 ASSESSMENT — PAIN SCALES - GENERAL: PAINLEVEL_OUTOF10: 8

## 2023-10-24 ASSESSMENT — PAIN DESCRIPTION - ONSET: ONSET: ONGOING

## 2023-10-24 ASSESSMENT — PAIN DESCRIPTION - ORIENTATION: ORIENTATION: LEFT

## 2023-10-24 ASSESSMENT — PAIN DESCRIPTION - PROGRESSION: CLINICAL_PROGRESSION: NOT CHANGED

## 2023-10-24 ASSESSMENT — PAIN - FUNCTIONAL ASSESSMENT: PAIN_FUNCTIONAL_ASSESSMENT: 0-10

## 2023-10-25 VITALS
SYSTOLIC BLOOD PRESSURE: 130 MMHG | TEMPERATURE: 99.3 F | DIASTOLIC BLOOD PRESSURE: 74 MMHG | HEIGHT: 67 IN | WEIGHT: 117 LBS | BODY MASS INDEX: 18.36 KG/M2 | HEART RATE: 89 BPM | OXYGEN SATURATION: 98 % | RESPIRATION RATE: 16 BRPM

## 2023-10-25 LAB
ALBUMIN SERPL BCP-MCNC: 4.2 G/DL (ref 3.4–5)
ALP SERPL-CCNC: 36 U/L (ref 33–110)
ALT SERPL W P-5'-P-CCNC: 9 U/L (ref 7–45)
AMORPH CRY #/AREA UR COMP ASSIST: NORMAL /HPF
ANION GAP SERPL CALC-SCNC: 9 MMOL/L (ref 10–20)
APPEARANCE UR: ABNORMAL
AST SERPL W P-5'-P-CCNC: 14 U/L (ref 9–39)
BASOPHILS # BLD AUTO: 0.02 X10*3/UL (ref 0–0.1)
BASOPHILS NFR BLD AUTO: 0.3 %
BILIRUB SERPL-MCNC: 0.4 MG/DL (ref 0–1.2)
BILIRUB UR STRIP.AUTO-MCNC: NEGATIVE MG/DL
BUN SERPL-MCNC: 10 MG/DL (ref 6–23)
CALCIUM SERPL-MCNC: 8.7 MG/DL (ref 8.6–10.3)
CHLORIDE SERPL-SCNC: 107 MMOL/L (ref 98–107)
CO2 SERPL-SCNC: 26 MMOL/L (ref 21–32)
COLOR UR: YELLOW
CREAT SERPL-MCNC: 0.71 MG/DL (ref 0.5–1.05)
EOSINOPHIL # BLD AUTO: 0.07 X10*3/UL (ref 0–0.7)
EOSINOPHIL NFR BLD AUTO: 1.1 %
ERYTHROCYTE [DISTWIDTH] IN BLOOD BY AUTOMATED COUNT: 11.5 % (ref 11.5–14.5)
GFR SERPL CREATININE-BSD FRML MDRD: >90 ML/MIN/1.73M*2
GLUCOSE SERPL-MCNC: 92 MG/DL (ref 74–99)
GLUCOSE UR STRIP.AUTO-MCNC: NEGATIVE MG/DL
HCT VFR BLD AUTO: 34.4 % (ref 36–46)
HGB BLD-MCNC: 11.5 G/DL (ref 12–16)
HOLD SPECIMEN: NORMAL
IMM GRANULOCYTES # BLD AUTO: 0.01 X10*3/UL (ref 0–0.7)
IMM GRANULOCYTES NFR BLD AUTO: 0.2 % (ref 0–0.9)
KETONES UR STRIP.AUTO-MCNC: NEGATIVE MG/DL
LACTATE SERPL-SCNC: 0.6 MMOL/L (ref 0.4–2)
LEUKOCYTE ESTERASE UR QL STRIP.AUTO: NEGATIVE
LYMPHOCYTES # BLD AUTO: 2.18 X10*3/UL (ref 1.2–4.8)
LYMPHOCYTES NFR BLD AUTO: 34.2 %
MCH RBC QN AUTO: 31.2 PG (ref 26–34)
MCHC RBC AUTO-ENTMCNC: 33.4 G/DL (ref 32–36)
MCV RBC AUTO: 93 FL (ref 80–100)
MONOCYTES # BLD AUTO: 0.53 X10*3/UL (ref 0.1–1)
MONOCYTES NFR BLD AUTO: 8.3 %
NEUTROPHILS # BLD AUTO: 3.57 X10*3/UL (ref 1.2–7.7)
NEUTROPHILS NFR BLD AUTO: 55.9 %
NITRITE UR QL STRIP.AUTO: NEGATIVE
NRBC BLD-RTO: 0 /100 WBCS (ref 0–0)
PH UR STRIP.AUTO: 7 [PH]
PLATELET # BLD AUTO: 201 X10*3/UL (ref 150–450)
PMV BLD AUTO: 8.6 FL (ref 7.5–11.5)
POTASSIUM SERPL-SCNC: 3.8 MMOL/L (ref 3.5–5.3)
PROT SERPL-MCNC: 6.5 G/DL (ref 6.4–8.2)
PROT UR STRIP.AUTO-MCNC: ABNORMAL MG/DL
RBC # BLD AUTO: 3.69 X10*6/UL (ref 4–5.2)
RBC # UR STRIP.AUTO: NEGATIVE /UL
RBC #/AREA URNS AUTO: NORMAL /HPF
SODIUM SERPL-SCNC: 138 MMOL/L (ref 136–145)
SP GR UR STRIP.AUTO: 1.02
SQUAMOUS #/AREA URNS AUTO: NORMAL /HPF
UROBILINOGEN UR STRIP.AUTO-MCNC: <2 MG/DL
WBC # BLD AUTO: 6.4 X10*3/UL (ref 4.4–11.3)
WBC #/AREA URNS AUTO: NORMAL /HPF

## 2023-10-25 PROCEDURE — 85025 COMPLETE CBC W/AUTO DIFF WBC: CPT | Performed by: EMERGENCY MEDICINE

## 2023-10-25 PROCEDURE — 2500000001 HC RX 250 WO HCPCS SELF ADMINISTERED DRUGS (ALT 637 FOR MEDICARE OP): Performed by: EMERGENCY MEDICINE

## 2023-10-25 PROCEDURE — 84075 ASSAY ALKALINE PHOSPHATASE: CPT | Performed by: EMERGENCY MEDICINE

## 2023-10-25 PROCEDURE — 83605 ASSAY OF LACTIC ACID: CPT | Performed by: EMERGENCY MEDICINE

## 2023-10-25 PROCEDURE — 36415 COLL VENOUS BLD VENIPUNCTURE: CPT | Performed by: EMERGENCY MEDICINE

## 2023-10-25 PROCEDURE — 81001 URINALYSIS AUTO W/SCOPE: CPT | Performed by: EMERGENCY MEDICINE

## 2023-10-25 RX ORDER — AMOXICILLIN AND CLAVULANATE POTASSIUM 875; 125 MG/1; MG/1
1 TABLET, FILM COATED ORAL EVERY 12 HOURS
Qty: 14 TABLET | Refills: 0 | Status: SHIPPED | OUTPATIENT
Start: 2023-10-25 | End: 2023-11-01

## 2023-10-25 RX ORDER — AMOXICILLIN AND CLAVULANATE POTASSIUM 500; 125 MG/1; MG/1
500 TABLET, FILM COATED ORAL ONCE
Status: COMPLETED | OUTPATIENT
Start: 2023-10-25 | End: 2023-10-25

## 2023-10-25 RX ORDER — IBUPROFEN 800 MG/1
800 TABLET ORAL 3 TIMES DAILY
Qty: 21 TABLET | Refills: 0 | Status: SHIPPED | OUTPATIENT
Start: 2023-10-25 | End: 2023-11-01

## 2023-10-25 RX ADMIN — AMOXICILLIN AND CLAVULANATE POTASSIUM 500 MG: 500; 125 TABLET, FILM COATED ORAL at 01:30

## 2023-10-25 NOTE — ED PROVIDER NOTES
HPI   Chief Complaint   Patient presents with    Abdominal Pain     Pt states having LUQ abd pain. Seen my [facility] yesterday and they did not note anything of significance so Pt came here for re evaluation       23-year-old female presents with chief complaint of left upper abdominal and flank pain.  Patient states symptoms started yesterday.  She stated she went to [facility] and they did nothing.  Patient denies any dysuria or hematuria with presenting complaint.  Patient states she has had nausea with some vomiting.  Denies any constipation or diarrhea with symptoms.  Denies any possibility of pregnancy.      History provided by:  Patient                      No data recorded                Patient History   Past Medical History:   Diagnosis Date    Attention-deficit hyperactivity disorder, predominantly hyperactive type     Attention deficit hyperactivity disorder (ADHD), predominantly hyperactive type    Concussion with no loss of consciousness 03/02/2018    Contusion of left hand 04/18/2023    Groin abscess 04/18/2023    Hand sprain, left, sequela 04/18/2023    Hematuria 04/18/2023    Left wrist pain 04/18/2023    Left wrist sprain, sequela 04/18/2023    Lump of skin 04/18/2023    Numbness of fingers 04/18/2023    Other conditions influencing health status 09/23/2020    Menarche    Personal history of other diseases of the female genital tract     History of ovarian cyst    Personal history of other diseases of the respiratory system     History of asthma    Post-op pain 04/18/2023    Right knee pain 04/18/2023    Stiffness of right knee, not elsewhere classified 04/18/2023     Past Surgical History:   Procedure Laterality Date    KNEE SURGERY      WISDOM TOOTH EXTRACTION       Family History   Problem Relation Name Age of Onset    No Known Problems Mother      ADD / ADHD Father      Cancer Maternal Grandmother      Hypertension Maternal Grandfather      IRMA disease Paternal Grandmother      Irritable  bowel syndrome Paternal Grandmother      Hypertension Paternal Grandfather       Social History     Tobacco Use    Smoking status: Never     Passive exposure: Never    Smokeless tobacco: Never   Vaping Use    Vaping Use: Every day    Substances: Nicotine    Devices: Refillable tank   Substance Use Topics    Alcohol use: Not Currently    Drug use: Never       Physical Exam   ED Triage Vitals [10/24/23 2322]   Temp Heart Rate Resp BP   37.4 °C (99.3 °F) 95 18 (!) 156/100      SpO2 Temp Source Heart Rate Source Patient Position   99 % Temporal -- Sitting      BP Location FiO2 (%)     Right arm --       Physical Exam  Vitals and nursing note reviewed.   Constitutional:       General: She is not in acute distress.     Appearance: She is well-developed.   HENT:      Head: Normocephalic and atraumatic.   Eyes:      Conjunctiva/sclera: Conjunctivae normal.   Cardiovascular:      Rate and Rhythm: Normal rate and regular rhythm.      Heart sounds: No murmur heard.  Pulmonary:      Effort: Pulmonary effort is normal. No respiratory distress.      Breath sounds: Normal breath sounds.   Abdominal:      Palpations: Abdomen is soft.      Tenderness: There is abdominal tenderness in the left upper quadrant.   Musculoskeletal:         General: No swelling.      Cervical back: Neck supple.   Skin:     General: Skin is warm and dry.      Capillary Refill: Capillary refill takes less than 2 seconds.   Neurological:      Mental Status: She is alert.   Psychiatric:         Mood and Affect: Mood normal.       Labs Reviewed   COMPREHENSIVE METABOLIC PANEL - Abnormal       Result Value    Glucose 92      Sodium 138      Potassium 3.8      Chloride 107      Bicarbonate 26      Anion Gap 9 (*)     Urea Nitrogen 10      Creatinine 0.71      eGFR >90      Calcium 8.7      Albumin 4.2      Alkaline Phosphatase 36      Total Protein 6.5      AST 14      Bilirubin, Total 0.4      ALT 9     URINALYSIS WITH REFLEX MICROSCOPIC AND CULTURE - Abnormal     Color, Urine Yellow      Appearance, Urine Hazy (*)     Specific Gravity, Urine 1.019      pH, Urine 7.0      Protein, Urine 30 (1+) (*)     Glucose, Urine NEGATIVE      Blood, Urine NEGATIVE      Ketones, Urine NEGATIVE      Bilirubin, Urine NEGATIVE      Urobilinogen, Urine <2.0      Nitrite, Urine NEGATIVE      Leukocyte Esterase, Urine NEGATIVE     CBC WITH AUTO DIFFERENTIAL - Abnormal    WBC 6.4      nRBC 0.0      RBC 3.69 (*)     Hemoglobin 11.5 (*)     Hematocrit 34.4 (*)     MCV 93      MCH 31.2      MCHC 33.4      RDW 11.5      Platelets 201      MPV 8.6      Neutrophils % 55.9      Immature Granulocytes %, Automated 0.2      Lymphocytes % 34.2      Monocytes % 8.3      Eosinophils % 1.1      Basophils % 0.3      Neutrophils Absolute 3.57      Immature Granulocytes Absolute, Automated 0.01      Lymphocytes Absolute 2.18      Monocytes Absolute 0.53      Eosinophils Absolute 0.07      Basophils Absolute 0.02     LACTATE - Normal    Lactate 0.6      Narrative:     Venipuncture immediately after or during the administration of Metamizole may lead to falsely low results. Testing should be performed immediately  prior to Metamizole dosing.   URINALYSIS WITH REFLEX MICROSCOPIC AND CULTURE    Narrative:     The following orders were created for panel order Urinalysis with Reflex Microscopic and Culture.  Procedure                               Abnormality         Status                     ---------                               -----------         ------                     Urinalysis with Reflex M...[[id number]]  Abnormal            Final result               Extra Urine Gray Tube[[id number]]                            In process                   Please view results for these tests on the individual orders.   EXTRA URINE GRAY TUBE   URINALYSIS MICROSCOPIC WITH REFLEX CULTURE    WBC, Urine 1-5      RBC, Urine 1-2      Squamous Epithelial Cells, Urine 1-9 (SPARSE)      Amorphous Crystals, Urine 1+          ED  Course & MDM   Diagnoses as of 10/25/23 0118   Left upper quadrant abdominal pain       Medical Decision Making  I did go over all the results with the patient.  She has been instructed to take Motrin for pain and antibiotics as indicated and if symptoms worsen return to ED.  Patient is improved and stable upon discharge.        Procedure  Procedures     Deonte Horn, DO  10/25/23 0119

## 2023-10-31 ENCOUNTER — OFFICE VISIT (OUTPATIENT)
Dept: URGENT CARE | Facility: CLINIC | Age: 23
End: 2023-10-31
Payer: COMMERCIAL

## 2023-10-31 VITALS
SYSTOLIC BLOOD PRESSURE: 105 MMHG | TEMPERATURE: 97.6 F | BODY MASS INDEX: 18.05 KG/M2 | OXYGEN SATURATION: 98 % | DIASTOLIC BLOOD PRESSURE: 73 MMHG | HEART RATE: 88 BPM | WEIGHT: 115 LBS | RESPIRATION RATE: 18 BRPM | HEIGHT: 67 IN

## 2023-10-31 DIAGNOSIS — J06.9 ACUTE URI: ICD-10-CM

## 2023-10-31 DIAGNOSIS — J02.9 ACUTE PHARYNGITIS, UNSPECIFIED ETIOLOGY: Primary | ICD-10-CM

## 2023-10-31 LAB
POC GROUP A STREP, PCR: NOT DETECTED
POC TRIPLEX FLU A-AG: NORMAL
POC TRIPLEX FLU B-AG: NORMAL
POC TRIPLEX SARSCOV-2 AG: NORMAL

## 2023-10-31 PROCEDURE — 87880 STREP A ASSAY W/OPTIC: CPT

## 2023-10-31 PROCEDURE — 99212 OFFICE O/P EST SF 10 MIN: CPT | Performed by: PHYSICIAN ASSISTANT

## 2023-10-31 PROCEDURE — 87637 SARSCOV2&INF A&B&RSV AMP PRB: CPT

## 2023-10-31 ASSESSMENT — ENCOUNTER SYMPTOMS
SINUS PRESSURE: 1
SHORTNESS OF BREATH: 0
WHEEZING: 0
CHILLS: 0
RHINORRHEA: 1
TROUBLE SWALLOWING: 0
SINUS PAIN: 1
SORE THROAT: 1
STRIDOR: 0
EYE PAIN: 0
FLU SYMPTOMS: 1
FEVER: 0
ABDOMINAL PAIN: 0

## 2023-10-31 NOTE — LETTER
October 31, 2023     Patient: Jessi Alvarado   YOB: 2000   Date of Visit: 10/31/2023       To Whom it May Concern:    Jessi Alvarado was seen in my clinic on 10/31/2023. She  may return on 11/02/2023 .    If you have any questions or concerns, please don't hesitate to call.         Sincerely,          Rosa Jane PA-C        CC: No Recipients

## 2023-10-31 NOTE — PROGRESS NOTES
[facility]   FELICE NOTE:    Name: Jessi Alvarado, 23 y.o.  female                   CSN:[id number] MRN:[id number]    PCP: Lambert Chaudhari MD    Chief Complaint: Flu Symptoms (Cough, headaches, bilateral ear pain ,fatigue, sinus congestion, white coating on tongue  X 2 days )    PMHx:    Past Medical History:   Diagnosis Date    Attention-deficit hyperactivity disorder, predominantly hyperactive type     Attention deficit hyperactivity disorder (ADHD), predominantly hyperactive type    Concussion with no loss of consciousness 03/02/2018    Contusion of left hand 04/18/2023    Groin abscess 04/18/2023    Hand sprain, left, sequela 04/18/2023    Hematuria 04/18/2023    Left wrist pain 04/18/2023    Left wrist sprain, sequela 04/18/2023    Lump of skin 04/18/2023    Numbness of fingers 04/18/2023    Other conditions influencing health status 09/23/2020    Menarche    Personal history of other diseases of the female genital tract     History of ovarian cyst    Personal history of other diseases of the respiratory system     History of asthma    Post-op pain 04/18/2023    Right knee pain 04/18/2023    Stiffness of right knee, not elsewhere classified 04/18/2023                               Allergies   Allergen Reactions    Cinnamon Shortness of breath     cinnamon    Pomegranate Fruit Extract Shortness of breath     pomagranate    Sulfa (Sulfonamide Antibiotics) Other    Sulfamethoxazole-Trimethoprim Hives and Other    Diphenhydramine Hcl Rash                               Medication Documentation Review Audit       Reviewed by Cecily Luo MA (Medical Assistant) on 10/31/23 at 0934      Medication Order Taking? Sig Documenting Provider Last Dose Status   albuterol 2.5 mg /3 mL (0.083 %) nebulizer solution [id number] Yes Inhale 3 mL (2.5 mg) every 6 hours if needed for wheezing. Historical Provider, MD Taking Active   albuterol 90 mcg/actuation inhaler [id number] Yes Inhale 2 puffs every  4 hours if needed for wheezing. Emi Cxo MD Taking Active   amoxicillin-pot clavulanate (Augmentin) 875-125 mg tablet [id number] No Take 1 tablet (875 mg) by mouth every 12 hours for 7 days.   Patient not taking: Reported on 10/31/2023    Deonte JOE Justina, DO Not Taking Active   ascorbic acid, vitamin C, 500 mg capsule [id number] Yes Take by mouth. Historical Provider, MD Taking Active   benzoyl peroxide 5 % lotion [id number] Yes 1 Application Historical Provider, MD Taking Active   cholecalciferol (Vitamin D-3) 125 MCG (5000 UT) capsule [id number] Yes Take by mouth. Historical Provider, MD Taking Active   diclofenac sodium (Voltaren XR) 100 mg 24 hr tablet [id number] Yes Take 1 tablet (100 mg) by mouth once daily. Historical Provider, MD Taking Active   elderberry fruit and flower 460-115 mg capsule [id number] Yes Take by mouth. Historical Provider, MD Taking Active   EPINEPHrine 0.3 mg/0.3 mL injection syringe [id number] Yes 0.3 MILLIGRAM(S) INTRAMUSCULARLY ONCE A DAY, AS NEEDED FOR SEVERE ALLERGIC REACTION Lambert Chaudhari MD Taking Active   ergocalciferol (Vitamin D-2) 1.25 MG (42651 UT) capsule [id number] Yes Take 1 capsule (50,000 Units) by mouth 1 (one) time per week. Emi Cox MD Taking Active   famotidine (Pepcid) 20 mg tablet [id number] Yes Take 1 tablet (20 mg) by mouth 2 times a day. Emi Cox MD Taking Active   FreeStyle Xi 3 Sensor device [id number] Yes  Historical Provider, MD Taking Active   ibuprofen 800 mg tablet [id number] Yes Take 1 tablet (800 mg) by mouth 3 times a day for 7 days. Deonte Horn, DO Taking Active   minocycline 50 mg capsule [id number] Yes 1 Capsule Historical Provider, MD Taking Active   neomycin-polymyxin-HC (Cortisporin) otic solution [id number] Yes  Historical Provider, MD Taking Active   norelgestromin-ethin.estradioL (Xulane) 150-35 mcg/24 hr [id number] Yes Place on the skin. Historical Provider, MD Taking Active   omeprazole (PriLOSEC) 40 mg DR capsule [id number]  Take  1 capsule (40 mg) by mouth once daily in the morning. Take before meals. Do not crush or chew. Emi Cox MD   10/26/23 9856   ondansetron ODT (Zofran-ODT) 4 mg disintegrating tablet [id number] Yes  Historical Provider, MD Taking Active   pedi multivit no.17 w-fluoride (Poly-Vi-Cony) 0.5 mg tablet,chewable [id number] Yes Chew 1 tablet once daily. Historical Provider, MD Taking Active   zinc sulfate (Zincate) 220 (50 Zn) MG capsule [id number] Yes Take by mouth once daily. Historical Provider, MD Taking Active                  PMSx:    Past Surgical History:   Procedure Laterality Date    KNEE SURGERY      WISDOM TOOTH EXTRACTION         Social Hx:     Social History     Tobacco Use    Smoking status: Never     Passive exposure: Never    Smokeless tobacco: Never   Substance Use Topics    Alcohol use: Not Currently       Patient is a 23-year-old female who presents to the urgent care with a chief complaint of upper respiratory symptoms.  She reports that she has congestion, ear pain, sore throat, occasional cough.  No fever or chills.  No nausea or vomiting.  No alleviating or exacerbating factors.  She states that she was seen in the emergency room on  for unrelated symptoms and was diagnosed with a urinary tract infection.  She was placed on Augmentin.  She states that she took about 3 doses of the Augmentin and then forgot to take anymore and has not restarted her medication.      Flu Symptoms  Presenting symptoms: rhinorrhea and sore throat    Presenting symptoms: no fever and no shortness of breath    Associated symptoms: ear pain and nasal congestion    Associated symptoms: no chills          Review of Systems   Constitutional:  Negative for chills and fever.   HENT:  Positive for congestion, ear pain, rhinorrhea, sinus pressure, sinus pain and sore throat. Negative for trouble swallowing.    Eyes:  Negative for pain.   Respiratory:  Negative for shortness of breath, wheezing and stridor.     Cardiovascular:  Negative for chest pain.   Gastrointestinal:  Negative for abdominal pain.   Skin:  Negative for rash.   All other systems reviewed and are negative.        Physical Exam  Vitals and nursing note reviewed.   Constitutional:       Appearance: Normal appearance.   HENT:      Head: Normocephalic and atraumatic.      Right Ear: Tympanic membrane, ear canal and external ear normal.      Left Ear: Tympanic membrane, ear canal and external ear normal.      Nose: Nose normal.      Mouth/Throat:      Mouth: Mucous membranes are moist.      Pharynx: Posterior oropharyngeal erythema present. No pharyngeal swelling, oropharyngeal exudate or uvula swelling.   Eyes:      Extraocular Movements: Extraocular movements intact.      Conjunctiva/sclera: Conjunctivae normal.      Pupils: Pupils are equal, round, and reactive to light.   Cardiovascular:      Rate and Rhythm: Normal rate and regular rhythm.      Pulses: Normal pulses.      Heart sounds: Normal heart sounds.   Pulmonary:      Effort: Pulmonary effort is normal. No respiratory distress.      Breath sounds: Normal breath sounds. No stridor. No wheezing or rhonchi.   Abdominal:      Palpations: Abdomen is soft.      Tenderness: There is no abdominal tenderness.   Musculoskeletal:         General: Normal range of motion.      Cervical back: Normal range of motion and neck supple. No rigidity.   Skin:     General: Skin is warm and dry.   Neurological:      General: No focal deficit present.      Mental Status: She is alert.       Vitals:    10/31/23 0935   BP: 105/73   Pulse: 88   Resp: 18   Temp: 36.4 °C (97.6 °F)   SpO2: 98%         LABORATORY @ RADIOLOGICAL IMAGING (if done):   No results found.   Results for orders placed or performed in visit on 10/31/23 (from the past 24 hour(s))   POCT Group A Streptococcus, PCR manually resulted   Result Value Ref Range    POC Group A Strep, PCR Not Detected Not Detected   POCT BD Veritor Triplex Ag manually resulted    Result Value Ref Range    POC Triplex SARS-CoV-2 Ag  Presumptive negative for Triplex SARS-CoV-2 (no antigen detected)     Presumptive negative for Triplex SARS-CoV-2 (no antigen detected)    POC Triplex Flu A-Ag  Presumptive negative for Triplex FLU A (no antigen detected)     Presumptive negative for Triplex FLU A (no antigen detected)    POC Triplex Flu B-Ag  Presumptive negative for Triplex FLU B (no antigen detected)     Presumptive negative for Triplex FLU B (no antigen detected)          COURSE/MEDICAL DECISION MAKING:  Patient is afebrile and nontoxic-appearing, presents to urgent care with upper respiratory symptoms.  Her COVID, influenza, RSV, and strep are negative.  She has had symptoms for approximately 2 days.  Discussed with patient that this is likely viral.  I also encouraged her to finish her antibiotic prescribed by the emergency department.  She was instructed to return immediately for any new or worsening symptoms and follow-up with her PCP.  Differential diagnosis includes but not limited to viral URI, COVID, influenza, RSV, strep, acute pharyngitis  Problem List Items Addressed This Visit    None  Visit Diagnoses       Acute pharyngitis, unspecified etiology    -  Primary    Relevant Orders    POCT Group A Streptococcus, PCR manually resulted (Completed)    POCT BD Veritor Triplex Ag manually resulted (Completed)    Acute URI                    KELLEE Newman Advanced Practice Provider  [facility]

## 2023-11-10 ENCOUNTER — TELEPHONE (OUTPATIENT)
Dept: PRIMARY CARE | Facility: CLINIC | Age: 23
End: 2023-11-10
Payer: COMMERCIAL

## 2023-11-10 NOTE — TELEPHONE ENCOUNTER
PATIENT AT WINDOW CONCERNED ABOUT HAVING EGD/COLON BECAUSE SHE HAS BEEN HAVING LOW BLOOD SUGAR.  SHE STATED SHE HAS A GLUCOSE CENSOR.  SHE IS CONCERNED ABOUT HOW YOU WILL MONITOR HER BLOOD SUGAR DURING THE PROCEDURES.  SHE STATES SHE DOESN'T TAKE ANY MEDICINE.  PATIENT PHONE NUMBER [phone]      PLEASE ADVISE

## 2023-11-15 ENCOUNTER — PREP FOR PROCEDURE (OUTPATIENT)
Dept: PRIMARY CARE | Facility: CLINIC | Age: 23
End: 2023-11-15

## 2023-11-15 ENCOUNTER — APPOINTMENT (OUTPATIENT)
Dept: ORTHOPEDIC SURGERY | Facility: CLINIC | Age: 23
End: 2023-11-15
Payer: COMMERCIAL

## 2023-11-15 RX ORDER — SODIUM CHLORIDE, SODIUM LACTATE, POTASSIUM CHLORIDE, CALCIUM CHLORIDE 600; 310; 30; 20 MG/100ML; MG/100ML; MG/100ML; MG/100ML
20 INJECTION, SOLUTION INTRAVENOUS CONTINUOUS
Status: CANCELLED | OUTPATIENT
Start: 2023-11-15

## 2023-11-16 ENCOUNTER — HOSPITAL ENCOUNTER (OUTPATIENT)
Dept: GASTROENTEROLOGY | Facility: CLINIC | Age: 23
Setting detail: OUTPATIENT SURGERY
Discharge: HOME | End: 2023-11-16
Payer: COMMERCIAL

## 2023-11-16 VITALS
OXYGEN SATURATION: 100 % | TEMPERATURE: 97.6 F | HEART RATE: 72 BPM | SYSTOLIC BLOOD PRESSURE: 110 MMHG | HEIGHT: 67 IN | DIASTOLIC BLOOD PRESSURE: 72 MMHG | BODY MASS INDEX: 18.62 KG/M2 | WEIGHT: 118.61 LBS | RESPIRATION RATE: 16 BRPM

## 2023-11-16 DIAGNOSIS — R10.33 ABDOMINAL PAIN, PERIUMBILICAL: ICD-10-CM

## 2023-11-16 DIAGNOSIS — R10.13 ABDOMINAL PAIN, EPIGASTRIC: ICD-10-CM

## 2023-11-16 DIAGNOSIS — K52.9 CHRONIC DIARRHEA: ICD-10-CM

## 2023-11-16 DIAGNOSIS — R10.12 ABDOMINAL PAIN, LUQ: ICD-10-CM

## 2023-11-16 PROCEDURE — 2500000001 HC RX 250 WO HCPCS SELF ADMINISTERED DRUGS (ALT 637 FOR MEDICARE OP): Performed by: INTERNAL MEDICINE

## 2023-11-16 PROCEDURE — 3700000012 HC SEDATION LEVEL 5+ TIME - INITIAL 15 MINUTES 5/> YEARS

## 2023-11-16 PROCEDURE — 7100000009 HC PHASE TWO TIME - INITIAL BASE CHARGE

## 2023-11-16 PROCEDURE — 3700000013 HC SEDATION LEVEL 5+ TIME - EACH ADDITIONAL 15 MINUTES

## 2023-11-16 PROCEDURE — 88305 TISSUE EXAM BY PATHOLOGIST: CPT | Mod: TC,SAMLAB | Performed by: INTERNAL MEDICINE

## 2023-11-16 PROCEDURE — 7100000010 HC PHASE TWO TIME - EACH INCREMENTAL 1 MINUTE

## 2023-11-16 PROCEDURE — 99153 MOD SED SAME PHYS/QHP EA: CPT | Performed by: INTERNAL MEDICINE

## 2023-11-16 PROCEDURE — 88305 TISSUE EXAM BY PATHOLOGIST: CPT | Mod: TC,SUR,SAMLAB | Performed by: INTERNAL MEDICINE

## 2023-11-16 PROCEDURE — 45380 COLONOSCOPY AND BIOPSY: CPT | Performed by: INTERNAL MEDICINE

## 2023-11-16 PROCEDURE — 43239 EGD BIOPSY SINGLE/MULTIPLE: CPT | Performed by: INTERNAL MEDICINE

## 2023-11-16 PROCEDURE — 99152 MOD SED SAME PHYS/QHP 5/>YRS: CPT | Performed by: INTERNAL MEDICINE

## 2023-11-16 PROCEDURE — 2500000004 HC RX 250 GENERAL PHARMACY W/ HCPCS (ALT 636 FOR OP/ED): Performed by: INTERNAL MEDICINE

## 2023-11-16 PROCEDURE — 88305 TISSUE EXAM BY PATHOLOGIST: CPT | Performed by: PATHOLOGY

## 2023-11-16 RX ORDER — SODIUM CHLORIDE, SODIUM LACTATE, POTASSIUM CHLORIDE, CALCIUM CHLORIDE 600; 310; 30; 20 MG/100ML; MG/100ML; MG/100ML; MG/100ML
20 INJECTION, SOLUTION INTRAVENOUS CONTINUOUS
Status: DISCONTINUED | OUTPATIENT
Start: 2023-11-16 | End: 2023-11-17 | Stop reason: HOSPADM

## 2023-11-16 RX ORDER — MIDAZOLAM HYDROCHLORIDE 5 MG/ML
INJECTION, SOLUTION INTRAMUSCULAR; INTRAVENOUS AS NEEDED
Status: COMPLETED | OUTPATIENT
Start: 2023-11-16 | End: 2023-11-16

## 2023-11-16 RX ORDER — MEPERIDINE HYDROCHLORIDE 25 MG/ML
INJECTION INTRAMUSCULAR; INTRAVENOUS; SUBCUTANEOUS AS NEEDED
Status: COMPLETED | OUTPATIENT
Start: 2023-11-16 | End: 2023-11-16

## 2023-11-16 RX ORDER — LIDOCAINE HYDROCHLORIDE 20 MG/ML
JELLY TOPICAL AS NEEDED
Status: COMPLETED | OUTPATIENT
Start: 2023-11-16 | End: 2023-11-16

## 2023-11-16 RX ORDER — MIDAZOLAM HYDROCHLORIDE 1 MG/ML
INJECTION, SOLUTION INTRAMUSCULAR; INTRAVENOUS AS NEEDED
Status: COMPLETED | OUTPATIENT
Start: 2023-11-16 | End: 2023-11-16

## 2023-11-16 RX ADMIN — SODIUM CHLORIDE, POTASSIUM CHLORIDE, SODIUM LACTATE AND CALCIUM CHLORIDE 20 ML/HR: 600; 310; 30; 20 INJECTION, SOLUTION INTRAVENOUS at 07:38

## 2023-11-16 RX ADMIN — MIDAZOLAM HYDROCHLORIDE 2.5 MG: 5 INJECTION, SOLUTION INTRAMUSCULAR; INTRAVENOUS at 08:57

## 2023-11-16 RX ADMIN — MEPERIDINE HYDROCHLORIDE 25 MG: 25 INJECTION INTRAMUSCULAR; INTRAVENOUS; SUBCUTANEOUS at 08:41

## 2023-11-16 RX ADMIN — MIDAZOLAM 2.5 MG: 1 INJECTION INTRAMUSCULAR; INTRAVENOUS at 08:41

## 2023-11-16 RX ADMIN — MIDAZOLAM 2.5 MG: 1 INJECTION INTRAMUSCULAR; INTRAVENOUS at 08:42

## 2023-11-16 RX ADMIN — LIDOCAINE HYDROCHLORIDE 20 ML: 20 JELLY TOPICAL at 08:39

## 2023-11-16 ASSESSMENT — PAIN SCALES - GENERAL
PAINLEVEL_OUTOF10: 0 - NO PAIN
PAINLEVEL_OUTOF10: 3
PAINLEVEL_OUTOF10: 0 - NO PAIN

## 2023-11-16 ASSESSMENT — PAIN - FUNCTIONAL ASSESSMENT
PAIN_FUNCTIONAL_ASSESSMENT: 0-10
PAIN_FUNCTIONAL_ASSESSMENT: 0-10

## 2023-11-16 ASSESSMENT — COLUMBIA-SUICIDE SEVERITY RATING SCALE - C-SSRS
6. HAVE YOU EVER DONE ANYTHING, STARTED TO DO ANYTHING, OR PREPARED TO DO ANYTHING TO END YOUR LIFE?: NO
2. HAVE YOU ACTUALLY HAD ANY THOUGHTS OF KILLING YOURSELF?: NO
1. IN THE PAST MONTH, HAVE YOU WISHED YOU WERE DEAD OR WISHED YOU COULD GO TO SLEEP AND NOT WAKE UP?: NO

## 2023-11-16 NOTE — H&P
Pre procedure H&P  Pt feels fine , no complaint  General appearance: Comfortable, no distress  ROS: No SOB  Medications reviewed  Head: Normal  Neck: Soft  Heart: Regular  Lungs: Clear  Abdomen: soft    Impression: clinically doing fine, proceed with procedure    Problem List Items Addressed This Visit       Abdominal pain, periumbilical    Relevant Orders    EGD    Abdominal pain, LUQ    Relevant Orders    EGD    Chronic diarrhea    Relevant Orders    Colonoscopy Screening    Abdominal pain, epigastric    Relevant Orders    EGD

## 2023-11-16 NOTE — DISCHARGE INSTRUCTIONS
Patient Instructions after a Colonoscopy      The anesthetics, sedatives or narcotics which were given to you today will be acting in your body for the next 24 hours, so you might feel a little sleepy or groggy.  This feeling should slowly wear off. Carefully read and follow the instructions.     You received sedation today:  - Do not drive or operate any machinery or power tools of any kind.   - No alcoholic beverages today, not even beer or wine.  - Do not make any important decisions or sign any legal documents.  - No over the counter medications that contain alcohol or that may cause drowsiness.  - Do not make any important decisions or sign any legal documents.    While it is common to experience mild to moderate abdominal distention, gas, or belching after your procedure, if any of these symptoms occur following discharge from the GI Lab or within one week of having your procedure, call the [facility] to be advised whether a visit to your nearest Urgent Care or Emergency Department is indicated.  Take this paper with you if you go.     - If you develop an allergic reaction to the medications that were given during your procedure such as difficulty breathing, rash, hives, severe nausea, vomiting or lightheadedness.  - If you experience chest pain, shortness of breath, severe abdominal pain, fevers and chills.  -If you develop signs and symptoms of bleeding such as blood in your spit, if your stools turn black, tarry, or bloody  - If you have not urinated within 8 hours following your procedure.  - If your IV site becomes painful, red, inflamed, or looks infected.    If you received a biopsy/polypectomy/sphincterotomy the following instructions apply below:    __ Do not use Aspirin containing products, non-steroidal medications or anti-coagulants for one week following your procedure. (Examples of these types of medications are: Advil, Arthrotec, Aleve, Coumadin, Ecotrin, Heparin, Ibuprofen,  Indocin, Motrin, Naprosyn, Nuprin, Plavix, Vioxx, and Voltarin, or their generic forms.  This list is not all-inclusive.  Check with your physician or pharmacist before resuming medications.)   __ Eat a soft diet today.  Avoid foods that are poorly digested for the next 24 hours.  These foods would include: nuts, beans, lettuce, red meats, and fried foods. Start with liquids and advance your diet as tolerated, gradually work up to eating solids.   __ Do not have a Barium Study or Enema for one week.    Your physician recommends the additional following instructions:    -You have a contact number available for emergencies. The signs and symptoms of potential delayed complications were discussed with you. You may return to normal activities tomorrow.  -Resume your previous diet.  -Continue your present medications.   -We are waiting for your pathology results.  -Your physician has recommended a repeat colonoscopy (date to be determined after pending pathology results are reviewed) for surveillance based on pathology results.  -The findings and recommendations have been discussed with you.  -The findings and recommendations were discussed with your family.  - Please see Medication Reconciliation Form for new medication/medications prescribed.       If you experience any problems or have any questions following discharge from the GI Lab, please call:        Nurse Signature                                                                        Date___________________                                                                            Patient/Responsible Party Signature                                        Date___________________Patient Instructions after an endoscopy or colonoscopy      The anesthetics, sedatives or narcotics which were given to you today will be acting in your body for the next 24 hours, so you might feel a little sleepy or groggy.  This feeling should slowly wear off. Carefully read and follow  the instructions.     You received sedation today:  - Do not drive or operate any machinery or power tools of any kind.   - No alcoholic beverages today, not even beer or wine.  - Do not make any important decisions or sign any legal documents.  - No over the counter medications that contain alcohol or that may cause drowsiness.  - Do not make any important decisions or sign any legal documents.    While it is common to experience mild to moderate abdominal distention, gas, or belching after your procedure, if any of these symptoms occur following discharge from the GI Lab or within one week of having your procedure, call the [facility] to be advised whether a visit to your nearest Urgent Care or Emergency Department is indicated.  Take this paper with you if you go.     - If you develop an allergic reaction to the medications that were given during your procedure such as difficulty breathing, rash, hives, severe nausea, vomiting or lightheadedness.- If you experience chest pain, shortness of breath, severe abdominal pain, fevers and chills.    -If you develop signs and symptoms of bleeding such as blood in your spit, if your stools turn black, tarry, or bloody    - If you have not urinated within 8 hours following your procedure.- If your IV site becomes painful, red, inflamed, or looks infected.

## 2023-11-20 ENCOUNTER — OFFICE VISIT (OUTPATIENT)
Dept: ORTHOPEDIC SURGERY | Facility: CLINIC | Age: 23
End: 2023-11-20
Payer: COMMERCIAL

## 2023-11-20 DIAGNOSIS — S62.111D CLOSED CHIP FRACTURE OF RIGHT TRIQUETRUM WITH ROUTINE HEALING, SUBSEQUENT ENCOUNTER: Primary | ICD-10-CM

## 2023-11-20 PROBLEM — K62.5 RECTAL BLEEDING: Status: ACTIVE | Noted: 2023-11-20

## 2023-11-20 PROBLEM — R73.9 HYPERGLYCEMIA: Status: ACTIVE | Noted: 2023-09-30

## 2023-11-20 PROBLEM — N20.1 URETERAL CALCULUS, RIGHT: Status: ACTIVE | Noted: 2023-11-20

## 2023-11-20 PROBLEM — E04.1 THYROID NODULE: Status: ACTIVE | Noted: 2023-10-09

## 2023-11-20 PROBLEM — E11.649: Status: ACTIVE | Noted: 2023-09-30

## 2023-11-20 PROBLEM — F41.9 ANXIETY: Status: ACTIVE | Noted: 2023-10-09

## 2023-11-20 PROCEDURE — 99212 OFFICE O/P EST SF 10 MIN: CPT | Performed by: NURSE PRACTITIONER

## 2023-11-20 PROCEDURE — 3044F HG A1C LEVEL LT 7.0%: CPT | Performed by: NURSE PRACTITIONER

## 2023-11-20 PROCEDURE — 1036F TOBACCO NON-USER: CPT | Performed by: NURSE PRACTITIONER

## 2023-11-20 ASSESSMENT — ENCOUNTER SYMPTOMS
NEUROLOGICAL NEGATIVE: 1
ENDOCRINE NEGATIVE: 1
HEMATOLOGIC/LYMPHATIC NEGATIVE: 1
PSYCHIATRIC NEGATIVE: 1
CARDIOVASCULAR NEGATIVE: 1
CONSTITUTIONAL NEGATIVE: 1
RESPIRATORY NEGATIVE: 1

## 2023-11-20 ASSESSMENT — PAIN - FUNCTIONAL ASSESSMENT: PAIN_FUNCTIONAL_ASSESSMENT: NO/DENIES PAIN

## 2023-11-20 NOTE — ASSESSMENT & PLAN NOTE
Reviewed symptom control with ice, ibuprofen and Tylenol per package directions.  Patient may use topical pain relief per package directions.  Patient may resume full activity and work. Work note provided with clearance for full duty.  Plan to follow up here on prn basis for any changes or concerns. She is in agreement with plan of care.  This note was generated using Dragon software.  It may contain errors in wording, punctuation or spelling.

## 2023-11-20 NOTE — PROGRESS NOTES
Subjective    Patient ID: Jessi Alvarado is a 23 y.o. female.    Chief Complaint: Follow-up of the Right Hand (Patient injured herself on 08/26/2023 (Right Triquetral chip FX). She states she is doing much better.)         Right Wrist       Jessi is a pleasant 23-year-old female presenting today for follow-up visit of wrist injury from 8/27/2023 with possible triquetral fracture. Requesting full duty clearance for all work activities.  No longer wearing brace.  Patient was unable to attend OT, however she was able to perform home exercises and feels she has full range of motion and strength back.  No OTC medications needed for symptom control.      Review of Systems   Constitutional: Negative.    HENT: Negative.     Respiratory: Negative.     Cardiovascular: Negative.    Endocrine: Negative.    Skin: Negative.    Neurological: Negative.    Hematological: Negative.    Psychiatric/Behavioral: Negative.        Objective   Right Hand Exam     Tenderness   Right hand tenderness location: Patient with mild tenderness range of motion and palpation of ulnar styloid region.    Range of Motion   Wrist   Extension:  normal   Flexion:  normal   Pronation:  normal   Supination:  normal     Muscle Strength   Wrist extension: 5/5   Wrist flexion: 5/5   : 5/5     Tests   Phalen’s Sign: negative  Tinel's sign (median nerve): negative  Finkelstein's test: negative    Other   Erythema: absent  Sensation: normal  Pulse: present    Comments:  Full ROM of wrist in all directions, full strength. Full range of motion of distal joints with distal motor sensor intact, cap            Image Results:  === 09/27/23 ===    WRIST    - Impression -  Definite triquetral fracture not seen. No callus.  Widening of the DRUJ.      MACRO:  None       Assessment/Plan   Encounter Diagnoses:  Problem List Items Addressed This Visit             ICD-10-CM    Closed chip fracture of triquetral bone of right wrist - Primary S62.111A     Reviewed  symptom control with ice, ibuprofen and Tylenol per package directions.  Patient may use topical pain relief per package directions.  Patient may resume full activity and work. Work note provided with clearance for full duty.  Plan to follow up here on prn basis for any changes or concerns. She is in agreement with plan of care.  This note was generated using Dragon software.  It may contain errors in wording, punctuation or spelling.            Relevant Orders    Follow Up In Orthopaedic Surgery

## 2023-12-06 LAB
LABORATORY COMMENT REPORT: NORMAL
PATH REPORT.FINAL DX SPEC: NORMAL
PATH REPORT.GROSS SPEC: NORMAL
PATH REPORT.TOTAL CANCER: NORMAL

## 2023-12-12 ENCOUNTER — OFFICE VISIT (OUTPATIENT)
Dept: URGENT CARE | Facility: CLINIC | Age: 23
End: 2023-12-12
Payer: COMMERCIAL

## 2023-12-12 ENCOUNTER — APPOINTMENT (OUTPATIENT)
Dept: PRIMARY CARE | Facility: CLINIC | Age: 23
End: 2023-12-12
Payer: COMMERCIAL

## 2023-12-12 VITALS
OXYGEN SATURATION: 97 % | HEART RATE: 78 BPM | WEIGHT: 115 LBS | RESPIRATION RATE: 18 BRPM | BODY MASS INDEX: 18.05 KG/M2 | HEIGHT: 67 IN | SYSTOLIC BLOOD PRESSURE: 100 MMHG | DIASTOLIC BLOOD PRESSURE: 67 MMHG | TEMPERATURE: 97.8 F

## 2023-12-12 DIAGNOSIS — J06.9 ACUTE UPPER RESPIRATORY INFECTION: Primary | ICD-10-CM

## 2023-12-12 LAB
POC TRIPLEX FLU A-AG: NORMAL
POC TRIPLEX FLU B-AG: NORMAL
POC TRIPLEX SARSCOV-2 AG: NORMAL

## 2023-12-12 PROCEDURE — 87428 SARSCOV & INF VIR A&B AG IA: CPT | Performed by: NURSE PRACTITIONER

## 2023-12-12 PROCEDURE — 99213 OFFICE O/P EST LOW 20 MIN: CPT | Mod: 25 | Performed by: NURSE PRACTITIONER

## 2023-12-12 RX ORDER — PREDNISONE 10 MG/1
30 TABLET ORAL DAILY
Qty: 15 TABLET | Refills: 0 | Status: SHIPPED | OUTPATIENT
Start: 2023-12-12 | End: 2023-12-17

## 2023-12-12 NOTE — PROGRESS NOTES
23 y.o. female presents for evaluation of URI.  Symptoms including cough, congestion, body aches, malaise, and headache have been present for 3 days and refractory to OTC meds. States she is on amoxicillin for left ear infection. No fever, chills, nausea, vomiting, abdominal pain, CP, or SOB.  No exacerbating factors. No known COVID 19/flu exposure.      Vitals:    12/12/23 1442   BP: 100/67   Pulse: 78   Resp: 18   Temp: 36.6 °C (97.8 °F)   SpO2: 97%       Allergies   Allergen Reactions    Cinnamon Shortness of breath     cinnamon    Pomegranate Fruit Extract Shortness of breath     pomagranate    Sulfa (Sulfonamide Antibiotics) Other    Sulfamethoxazole-Trimethoprim Hives and Other       Medication Documentation Review Audit       Reviewed by Cecily Luo MA (Medical Assistant) on 12/12/23 at 1441      Medication Order Taking? Sig Documenting Provider Last Dose Status   albuterol 2.5 mg /3 mL (0.083 %) nebulizer solution [id number] Yes Inhale 3 mL (2.5 mg) every 6 hours if needed for wheezing. Historical Provider, MD Taking Active   albuterol 90 mcg/actuation inhaler [id number] Yes Inhale 2 puffs every 4 hours if needed for wheezing. Emi Cox MD Taking Active   ascorbic acid, vitamin C, 500 mg capsule [id number] Yes Take by mouth. Historical Provider, MD Taking Active   benzoyl peroxide 5 % lotion [id number] Yes 1 Application Historical Provider, MD Taking Active   cholecalciferol (Vitamin D-3) 125 MCG (5000 UT) capsule [id number] Yes Take by mouth. Historical Provider, MD Taking Active   elderberry fruit and flower 460-115 mg capsule [id number] Yes Take by mouth. Historical Provider, MD Taking Active   EPINEPHrine 0.3 mg/0.3 mL injection syringe [id number] Yes 0.3 MILLIGRAM(S) INTRAMUSCULARLY ONCE A DAY, AS NEEDED FOR SEVERE ALLERGIC REACTION Lambert Chaudhari MD Taking Active   ergocalciferol (Vitamin D-2) 1.25 MG (63805 UT) capsule [id number] Yes Take 1 capsule (50,000 Units) by mouth 1 (one) time per week.  Emi Cox MD Taking Active   famotidine (Pepcid) 20 mg tablet [id number] Yes Take 1 tablet (20 mg) by mouth 2 times a day. Emi Cox MD Taking Active   FreeStyle Xi 3 Sensor device [id number] Yes  Historical Provider, MD Taking Active   neomycin-polymyxin-HC (Cortisporin) otic solution [id number] Yes  Historical Provider, MD Taking Active   norelgestromin-ethin.estradioL (Xulane) 150-35 mcg/24 hr [id number] Yes Place on the skin. Historical Provider, MD Taking Active   ondansetron ODT (Zofran-ODT) 4 mg disintegrating tablet [id number] Yes  Historical Provider, MD Taking Active   pedi multivit no.17 w-fluoride (Poly-Vi-Cony) 0.5 mg tablet,chewable [id number] Yes Chew 1 tablet once daily. Historical Provider, MD Taking Active   zinc sulfate (Zincate) 220 (50 Zn) MG capsule [id number] Yes Take by mouth once daily. Historical Provider, MD Taking Active                    Past Medical History:   Diagnosis Date    Attention-deficit hyperactivity disorder, predominantly hyperactive type     Attention deficit hyperactivity disorder (ADHD), predominantly hyperactive type    Concussion with no loss of consciousness 03/02/2018    Contusion of left hand 04/18/2023    Groin abscess 04/18/2023    Hand sprain, left, sequela 04/18/2023    Hematuria 04/18/2023    Left wrist pain 04/18/2023    Left wrist sprain, sequela 04/18/2023    Lump of skin 04/18/2023    Numbness of fingers 04/18/2023    Other conditions influencing health status 09/23/2020    Menarche    Personal history of other diseases of the female genital tract     History of ovarian cyst    Personal history of other diseases of the respiratory system     History of asthma    Post-op pain 04/18/2023    Right knee pain 04/18/2023    Stiffness of right knee, not elsewhere classified 04/18/2023       Past Surgical History:   Procedure Laterality Date    KNEE SURGERY      WISDOM TOOTH EXTRACTION         ROS  See HPI    Physical Exam  Vitals and nursing note reviewed.    Constitutional:       Appearance: She is normal weight. She is ill-appearing (mildly).   HENT:      Head: Normocephalic and atraumatic.      Right Ear: Tympanic membrane and ear canal normal.      Left Ear: Ear canal normal. Tympanic membrane is erythematous.      Nose: Congestion present.      Mouth/Throat:      Mouth: Mucous membranes are moist.      Pharynx: Oropharynx is clear.   Eyes:      Extraocular Movements: Extraocular movements intact.      Conjunctiva/sclera: Conjunctivae normal.      Pupils: Pupils are equal, round, and reactive to light.   Cardiovascular:      Rate and Rhythm: Normal rate and regular rhythm.      Heart sounds: Normal heart sounds. No murmur heard.     No gallop.   Pulmonary:      Effort: Pulmonary effort is normal.      Breath sounds: Normal breath sounds.   Lymphadenopathy:      Cervical: No cervical adenopathy.   Skin:     General: Skin is warm and dry.      Capillary Refill: Capillary refill takes less than 2 seconds.   Neurological:      General: No focal deficit present.      Mental Status: She is alert and oriented to person, place, and time.   Psychiatric:         Mood and Affect: Mood normal.         Behavior: Behavior normal.       Recent Results (from the past 1 hour(s))   POCT BD Veritor Triplex Ag    Collection Time: 12/12/23  3:33 PM   Result Value Ref Range    POC Triplex SARS-CoV-2 Ag  Presumptive negative for Triplex SARS-CoV-2 (no antigen detected)     Presumptive negative for Triplex SARS-CoV-2 (no antigen detected)    POC Triplex Flu A-Ag  Presumptive negative for Triplex FLU A (no antigen detected)     Presumptive negative for Triplex FLU A (no antigen detected)    POC Triplex Flu B-Ag  Presumptive negative for Triplex FLU B (no antigen detected)     Presumptive negative for Triplex FLU B (no antigen detected)       Assessment/Plan/MDM  Jessi was seen today for uri.  Diagnoses and all orders for this visit:  Acute upper respiratory infection (Primary)  -     POCT  BD Veritor Triplex Ag  -     predniSONE (Deltasone) 10 mg tablet; Take 3 tablets (30 mg) by mouth once daily for 5 days.    Advised patient to continue amoxicillin as prescribed. Encouraged pt to continue otc cold remedies PRN, push by mouth fluids and rest. Patient's clinical presentation is otherwise unremarkable at this time. Patient is discharged with instructions to follow-up with primary care or seek emergency medical attention for worsening symptoms or any new concerns.    Frantz Onofre, CNP  [facility]  [phone]

## 2023-12-12 NOTE — LETTER
December 12, 2023     Patient: Jessi Alvarado   YOB: 2000   Date of Visit: 12/12/2023       To Whom It May Concern:    Jessi Alvarado was seen in my clinic on 12/12/2023 at 2:30 pm. Please excuse Jessi for her absence from work on this day to make the appointment.    If you have any questions or concerns, please don't hesitate to call.         Sincerely,         Frantz Onofre, APRN-CNP        CC: No Recipients

## 2023-12-19 ENCOUNTER — APPOINTMENT (OUTPATIENT)
Dept: RADIOLOGY | Facility: HOSPITAL | Age: 23
End: 2023-12-19
Payer: COMMERCIAL

## 2023-12-19 ENCOUNTER — HOSPITAL ENCOUNTER (EMERGENCY)
Facility: HOSPITAL | Age: 23
Discharge: HOME | End: 2023-12-19
Payer: COMMERCIAL

## 2023-12-19 VITALS
OXYGEN SATURATION: 100 % | HEIGHT: 67 IN | BODY MASS INDEX: 18.05 KG/M2 | TEMPERATURE: 98.7 F | WEIGHT: 115 LBS | SYSTOLIC BLOOD PRESSURE: 107 MMHG | DIASTOLIC BLOOD PRESSURE: 69 MMHG | HEART RATE: 85 BPM | RESPIRATION RATE: 18 BRPM

## 2023-12-19 DIAGNOSIS — S60.221A CONTUSION OF RIGHT HAND, INITIAL ENCOUNTER: Primary | ICD-10-CM

## 2023-12-19 PROCEDURE — 99283 EMERGENCY DEPT VISIT LOW MDM: CPT

## 2023-12-19 PROCEDURE — 2500000001 HC RX 250 WO HCPCS SELF ADMINISTERED DRUGS (ALT 637 FOR MEDICARE OP): Performed by: NURSE PRACTITIONER

## 2023-12-19 PROCEDURE — 73130 X-RAY EXAM OF HAND: CPT | Mod: RIGHT SIDE | Performed by: RADIOLOGY

## 2023-12-19 PROCEDURE — 73130 X-RAY EXAM OF HAND: CPT | Mod: RT,FR

## 2023-12-19 RX ORDER — IBUPROFEN 600 MG/1
600 TABLET ORAL ONCE
Status: COMPLETED | OUTPATIENT
Start: 2023-12-19 | End: 2023-12-19

## 2023-12-19 RX ORDER — IBUPROFEN 600 MG/1
600 TABLET ORAL EVERY 8 HOURS PRN
Qty: 30 TABLET | Refills: 0 | Status: SHIPPED | OUTPATIENT
Start: 2023-12-19

## 2023-12-19 RX ADMIN — IBUPROFEN 600 MG: 600 TABLET ORAL at 17:42

## 2023-12-19 ASSESSMENT — PAIN - FUNCTIONAL ASSESSMENT: PAIN_FUNCTIONAL_ASSESSMENT: 0-10

## 2023-12-19 ASSESSMENT — PAIN SCALES - GENERAL: PAINLEVEL_OUTOF10: 7

## 2023-12-19 NOTE — ED PROVIDER NOTES
"Emergency Department Encounter  [facility] EMERGENCY MEDICINE    Patient: Jessi Alvarado  MRN: [id number]  : 2000  Date of Evaluation: 2023  ED Provider: LAY Gomez-YVES      Chief Complaint       Chief Complaint   Patient presents with    Hand Pain     Right thumb injury at work \"I was pushing a bar and my hand slipped and smashed it\" Happened just PTA. Works for [employer].      [place]    (Location/Symptom, Timing/Onset, Context/Setting, Quality, Duration, Modifying Factors, Severity) Note limiting factors.   Limitations to History: None  Historian: Self  Records reviewed: EMR inpatient and outpatient notes, Care Everywhere      Jessi Alvarado is a 23 y.o. female who presents to the emergency department complaining of base of right thumb pain, injury.  Was at work and was pushing a bar, states that her hand slipped and she smashed at the base of her thumb against the bar.  Denies any other injury, is right-hand dominant.  Does report that she broke her wrist in August and had some stiffness to the right thumb.    ROS:     Review of Systems  14 systems reviewed and otherwise acutely negative except as in the [place].          Past History     Past Medical History:   Diagnosis Date    Attention-deficit hyperactivity disorder, predominantly hyperactive type     Attention deficit hyperactivity disorder (ADHD), predominantly hyperactive type    Concussion with no loss of consciousness 2018    Contusion of left hand 2023    Groin abscess 2023    Hand sprain, left, sequela 2023    Hematuria 2023    Left wrist pain 2023    Left wrist sprain, sequela 2023    Lump of skin 2023    Numbness of fingers 2023    Other conditions influencing health status 2020    Menarche    Personal history of other diseases of the female genital tract     History of ovarian cyst    Personal history of other diseases of the " respiratory system     History of asthma    Post-op pain 04/18/2023    Right knee pain 04/18/2023    Stiffness of right knee, not elsewhere classified 04/18/2023     Past Surgical History:   Procedure Laterality Date    KNEE SURGERY      WISDOM TOOTH EXTRACTION       Social History     Socioeconomic History    Marital status: Single     Spouse name: None    Number of children: None    Years of education: None    Highest education level: None   Occupational History    None   Tobacco Use    Smoking status: Never     Passive exposure: Never    Smokeless tobacco: Never   Vaping Use    Vaping Use: Every day    Substances: Nicotine    Devices: RefPasslogixble tank   Substance and Sexual Activity    Alcohol use: Not Currently    Drug use: Never    Sexual activity: None   Other Topics Concern    None   Social History Narrative    None     Social Determinants of Health     Financial Resource Strain: Not on file   Food Insecurity: Not on file   Transportation Needs: Not on file   Physical Activity: Not on file   Stress: Not on file   Social Connections: Not on file   Intimate Partner Violence: Not on file   Housing Stability: Not on file       Medications/Allergies     Previous Medications    ALBUTEROL 2.5 MG /3 ML (0.083 %) NEBULIZER SOLUTION    Inhale 3 mL (2.5 mg) every 6 hours if needed for wheezing.    ALBUTEROL 90 MCG/ACTUATION INHALER    Inhale 2 puffs every 4 hours if needed for wheezing.    ASCORBIC ACID, VITAMIN C, 500 MG CAPSULE    Take by mouth.    BENZOYL PEROXIDE 5 % LOTION    1 Application    CHOLECALCIFEROL (VITAMIN D-3) 125 MCG (5000 UT) CAPSULE    Take by mouth.    ELDERBERRY FRUIT AND FLOWER 460-115 MG CAPSULE    Take by mouth.    EPINEPHRINE 0.3 MG/0.3 ML INJECTION SYRINGE    0.3 MILLIGRAM(S) INTRAMUSCULARLY ONCE A DAY, AS NEEDED FOR SEVERE ALLERGIC REACTION    ERGOCALCIFEROL (VITAMIN D-2) 1.25 MG (43814 UT) CAPSULE    Take 1 capsule (50,000 Units) by mouth 1 (one) time per week.    FAMOTIDINE (PEPCID) 20 MG  TABLET    Take 1 tablet (20 mg) by mouth 2 times a day.    FREESTYLE ANYI 3 SENSOR DEVICE        NEOMYCIN-POLYMYXIN-HC (CORTISPORIN) OTIC SOLUTION        NORELGESTROMIN-ETHIN.ESTRADIOL (XULANE) 150-35 MCG/24 HR    Place on the skin.    ONDANSETRON ODT (ZOFRAN-ODT) 4 MG DISINTEGRATING TABLET        PEDI MULTIVIT NO.17 W-FLUORIDE (POLY-VI-YOVANY) 0.5 MG TABLET,CHEWABLE    Chew 1 tablet once daily.    ZINC SULFATE (ZINCATE) 220 (50 ZN) MG CAPSULE    Take by mouth once daily.     Allergies   Allergen Reactions    Cinnamon Shortness of breath     cinnamon    Pomegranate Fruit Extract Shortness of breath     pomagranate    Sulfa (Sulfonamide Antibiotics) Other    Sulfamethoxazole-Trimethoprim Hives and Other        Physical Exam       ED Triage Vitals [12/19/23 1714]   Temp Heart Rate Resp BP   37.1 °C (98.7 °F) 99 16 115/77      SpO2 Temp Source Heart Rate Source Patient Position   100 % Temporal -- --      BP Location FiO2 (%)     -- --         Physical Exam    GENERAL:  The patient appears nourished and normally developed. Vital signs as documented.     HEENT:  Head normocephalic, atraumatic, EOMs intact, PERRLA, Mucous membranes moist. Nares patent without copious rhinorrhea.  No lymphadenopathy.    PULMONARY:  Lungs are clear to auscultation, without any respiratory distress. Able to speak full sentences, no accessory muscle use    CARDIAC:   Normal rate. No murmurs, rubs or gallops    ABDOMEN:  Soft, non distended, non tender, BS positive x 4 quadrants, No rebound or guarding, no peritoneal signs, no CVA tenderness, no masses or organomegaly    MUSCULOSKELETAL:   Able to ambulate, decreased range of motion to the right thumb MCP with edema and tenderness on palpation, able to flex and extend at the right DIP of the first digit.  Capillary refill less than 3 seconds, able to move digits 2 through 5 on the right hand.  Denies any pain, tenderness to the right wrist.  Pulses are intact    SKIN:   Good color, with no  "significant rashes.  No pallor.    NEURO:  No obvious neurological deficits, normal sensation and strength bilaterally.  Able to follow commands, NIH 0, CN 2-12 intact.        Diagnostics   Labs:  Labs Reviewed - No data to display  Radiographs:  XR hand right 3+ views   Final Result   No acute osseous abnormalities.   Signed by Yanick Eubanks MD                Assessment   In brief, Jessi Alvarado is a 23 y.o. female who presented to the emergency department for right thumb injury    Plan   Imaging, analgesics    Differentials   Contusion  Fracture  Dislocation    ED Course     Diagnoses as of 12/19/23 1826   Contusion of right hand, initial encounter       Visit Vitals  /77   Pulse 99   Temp 37.1 °C (98.7 °F) (Temporal)   Resp 16   Ht 1.702 m (5' 7\")   Wt 52.2 kg (115 lb)   LMP  (Within Weeks)   SpO2 100%   BMI 18.01 kg/m²   OB Status Having periods   Smoking Status Never   BSA 1.57 m²       Medications   ibuprofen tablet 600 mg (600 mg oral Given 12/19/23 1742)       Plan of care discussed, patient is stable appearing, in no distress, given ibuprofen for pain, sent for imaging, results reviewed and discussed, no obvious fracture or dislocation.  Patient will be discharged home in stable condition, given a prescription for NSAIDs, educated on any worsening signs and symptoms to return to the emergency department.      Final Impression      1. Contusion of right hand, initial encounter          DISPOSITION  Disposition: Discharge  Patient condition is: Stable    Comment: Please note this report has been produced using speech recognition software and may contain errors related to that system including errors in grammar, punctuation, and spelling, as well as words and phrases that may be inappropriate.  If there are any questions or concerns please feel free to contact the dictating provider for clarification.    ROMY Gomez APRN-CNP  12/19/23 1827    "

## 2023-12-24 ENCOUNTER — HOSPITAL ENCOUNTER (EMERGENCY)
Facility: HOSPITAL | Age: 23
Discharge: HOME | End: 2023-12-24
Attending: EMERGENCY MEDICINE
Payer: COMMERCIAL

## 2023-12-24 VITALS
TEMPERATURE: 98.1 F | DIASTOLIC BLOOD PRESSURE: 69 MMHG | SYSTOLIC BLOOD PRESSURE: 113 MMHG | HEART RATE: 101 BPM | HEIGHT: 67 IN | BODY MASS INDEX: 18.05 KG/M2 | RESPIRATION RATE: 16 BRPM | OXYGEN SATURATION: 100 % | WEIGHT: 115 LBS

## 2023-12-24 DIAGNOSIS — S69.91XS HAND INJURY, RIGHT, SEQUELA: Primary | ICD-10-CM

## 2023-12-24 PROCEDURE — 99281 EMR DPT VST MAYX REQ PHY/QHP: CPT | Performed by: EMERGENCY MEDICINE

## 2023-12-24 PROCEDURE — 99282 EMERGENCY DEPT VISIT SF MDM: CPT

## 2023-12-24 ASSESSMENT — COLUMBIA-SUICIDE SEVERITY RATING SCALE - C-SSRS
2. HAVE YOU ACTUALLY HAD ANY THOUGHTS OF KILLING YOURSELF?: NO
6. HAVE YOU EVER DONE ANYTHING, STARTED TO DO ANYTHING, OR PREPARED TO DO ANYTHING TO END YOUR LIFE?: NO
1. IN THE PAST MONTH, HAVE YOU WISHED YOU WERE DEAD OR WISHED YOU COULD GO TO SLEEP AND NOT WAKE UP?: NO

## 2023-12-24 ASSESSMENT — PAIN - FUNCTIONAL ASSESSMENT: PAIN_FUNCTIONAL_ASSESSMENT: 0-10

## 2023-12-24 ASSESSMENT — PAIN SCALES - GENERAL: PAINLEVEL_OUTOF10: 7

## 2023-12-24 NOTE — ED PROVIDER NOTES
HPI   Chief Complaint   Patient presents with    Hand Pain     Patient states she smashed her right thumb in a bar at work on Tuesday, was seen here and had it xrayed. Complains of continued pain and decreased mobility       Patient presents to the emergency department secondary to right hand pain.  The patient was seen here for an injury 5 days ago on a metal bar at work struck her on the thumb.  X-ray of the hand at that time was negative.  There has been no new injury.  Patient is occasionally icing the area and will take Tylenol at times for pain but not doing either of these regularly.  She returns today as she states she is still having some pain over the area and her symptoms have not resolved yet to this point.      History provided by:  Patient   used: No                        [place] Coma Scale Score: 15                  Patient History   Past Medical History:   Diagnosis Date    Attention-deficit hyperactivity disorder, predominantly hyperactive type     Attention deficit hyperactivity disorder (ADHD), predominantly hyperactive type    Concussion with no loss of consciousness 03/02/2018    Contusion of left hand 04/18/2023    Groin abscess 04/18/2023    Hand sprain, left, sequela 04/18/2023    Hematuria 04/18/2023    Left wrist pain 04/18/2023    Left wrist sprain, sequela 04/18/2023    Lump of skin 04/18/2023    Numbness of fingers 04/18/2023    Other conditions influencing health status 09/23/2020    Menarche    Personal history of other diseases of the female genital tract     History of ovarian cyst    Personal history of other diseases of the respiratory system     History of asthma    Post-op pain 04/18/2023    Right knee pain 04/18/2023    Stiffness of right knee, not elsewhere classified 04/18/2023     Past Surgical History:   Procedure Laterality Date    KNEE SURGERY      WISDOM TOOTH EXTRACTION       Family History   Problem Relation Name Age of Onset    No Known Problems  Mother      ADD / ADHD Father      Cancer Maternal Grandmother      Hypertension Maternal Grandfather      IRMA disease Paternal Grandmother      Irritable bowel syndrome Paternal Grandmother      Hypertension Paternal Grandfather       Social History     Tobacco Use    Smoking status: Never     Passive exposure: Never    Smokeless tobacco: Never   Vaping Use    Vaping Use: Every day    Substances: Nicotine    Devices: Refillable tank   Substance Use Topics    Alcohol use: Not Currently    Drug use: Never       Physical Exam   ED Triage Vitals [12/24/23 1219]   Temp Heart Rate Resp BP   36.7 °C (98.1 °F) 101 16 113/69      SpO2 Temp src Heart Rate Source Patient Position   100 % -- Monitor --      BP Location FiO2 (%)     -- --       Physical Exam  Vitals and nursing note reviewed.   Constitutional:       General: She is not in acute distress.     Appearance: Normal appearance. She is normal weight. She is not ill-appearing, toxic-appearing or diaphoretic.   HENT:      Head: Normocephalic and atraumatic.      Nose: Rhinorrhea present.   Neck:      Comments: Trachea is midline  Cardiovascular:      Heart sounds: No murmur heard.  Musculoskeletal:         General: Swelling and tenderness present. No deformity. Normal range of motion.      Cervical back: Normal range of motion.      Comments: Trace soft tissue edema and pain to palpation noted over the MCP joint of the first digit of the right hand.  Range of motion is somewhat limited but there is no bony deformity.  The wrist examination is completely unremarkable and there is no point tenderness over the anatomical snuffbox.   Skin:     General: Skin is warm and dry.      Findings: No rash.   Neurological:      General: No focal deficit present.      Mental Status: She is alert and oriented to person, place, and time. Mental status is at baseline.      Sensory: No sensory deficit.   Psychiatric:         Mood and Affect: Mood normal.         Behavior: Behavior normal.          Thought Content: Thought content normal.         Judgment: Judgment normal.         ED Course & MDM   Diagnoses as of 12/24/23 1235   Hand injury, right, sequela       Medical Decision Making  I reviewed the patient's x-ray which was also read by the interpreting radiologist from the patient's initial visit.  Given that there is no new injury I do not feel this needs to be repeated.  Based on the physical examination this is consistent with a contusion.  I explained to the patient that the area should be iced several times daily and I recommended 4-5 times daily for 20 minutes at a time.  Alternate Tylenol with Motrin every 4 hours, and reassurance was given.  Orthopedic referral for follow-up and limit activity as tolerated.  Return if worse.        Procedure  Procedures     Melchor Eubanks, DO  12/25/23 1918

## 2024-01-02 ENCOUNTER — HOSPITAL ENCOUNTER (EMERGENCY)
Facility: HOSPITAL | Age: 24
Discharge: HOME | End: 2024-01-03
Attending: EMERGENCY MEDICINE
Payer: COMMERCIAL

## 2024-01-02 VITALS
HEART RATE: 80 BPM | BODY MASS INDEX: 18.05 KG/M2 | TEMPERATURE: 97.5 F | OXYGEN SATURATION: 100 % | WEIGHT: 115 LBS | HEIGHT: 67 IN | DIASTOLIC BLOOD PRESSURE: 63 MMHG | RESPIRATION RATE: 18 BRPM | SYSTOLIC BLOOD PRESSURE: 122 MMHG

## 2024-01-02 DIAGNOSIS — N30.00 ACUTE CYSTITIS WITHOUT HEMATURIA: Primary | ICD-10-CM

## 2024-01-02 DIAGNOSIS — B37.31 VAGINAL YEAST INFECTION: ICD-10-CM

## 2024-01-02 PROCEDURE — 99283 EMERGENCY DEPT VISIT LOW MDM: CPT | Performed by: EMERGENCY MEDICINE

## 2024-01-02 ASSESSMENT — PAIN SCALES - GENERAL: PAINLEVEL_OUTOF10: 7

## 2024-01-02 ASSESSMENT — PAIN - FUNCTIONAL ASSESSMENT: PAIN_FUNCTIONAL_ASSESSMENT: 0-10

## 2024-01-03 ENCOUNTER — APPOINTMENT (OUTPATIENT)
Dept: RADIOLOGY | Facility: HOSPITAL | Age: 24
End: 2024-01-03
Payer: COMMERCIAL

## 2024-01-03 ENCOUNTER — HOSPITAL ENCOUNTER (EMERGENCY)
Facility: HOSPITAL | Age: 24
Discharge: HOME | End: 2024-01-03
Payer: COMMERCIAL

## 2024-01-03 ENCOUNTER — OFFICE VISIT (OUTPATIENT)
Dept: PRIMARY CARE | Facility: CLINIC | Age: 24
End: 2024-01-03
Payer: COMMERCIAL

## 2024-01-03 VITALS
SYSTOLIC BLOOD PRESSURE: 114 MMHG | WEIGHT: 124 LBS | HEIGHT: 67 IN | BODY MASS INDEX: 19.46 KG/M2 | DIASTOLIC BLOOD PRESSURE: 58 MMHG

## 2024-01-03 VITALS
SYSTOLIC BLOOD PRESSURE: 96 MMHG | OXYGEN SATURATION: 100 % | BODY MASS INDEX: 19.62 KG/M2 | WEIGHT: 125 LBS | TEMPERATURE: 98.8 F | HEART RATE: 67 BPM | HEIGHT: 67 IN | RESPIRATION RATE: 16 BRPM | DIASTOLIC BLOOD PRESSURE: 67 MMHG

## 2024-01-03 DIAGNOSIS — K59.00 CONSTIPATION, UNSPECIFIED CONSTIPATION TYPE: ICD-10-CM

## 2024-01-03 DIAGNOSIS — R10.13 ABDOMINAL PAIN, EPIGASTRIC: ICD-10-CM

## 2024-01-03 DIAGNOSIS — R10.31 ABDOMINAL PAIN, RLQ: ICD-10-CM

## 2024-01-03 DIAGNOSIS — R10.32 ABDOMINAL PAIN, LLQ: ICD-10-CM

## 2024-01-03 DIAGNOSIS — R10.9 FLANK PAIN: Primary | ICD-10-CM

## 2024-01-03 DIAGNOSIS — R30.0 DYSURIA: ICD-10-CM

## 2024-01-03 LAB
ALBUMIN SERPL BCP-MCNC: 4.3 G/DL (ref 3.4–5)
ALP SERPL-CCNC: 42 U/L (ref 33–110)
ALT SERPL W P-5'-P-CCNC: 10 U/L (ref 7–45)
ANION GAP SERPL CALC-SCNC: 9 MMOL/L (ref 10–20)
APPEARANCE UR: ABNORMAL
APPEARANCE UR: CLEAR
AST SERPL W P-5'-P-CCNC: 15 U/L (ref 9–39)
BACTERIA #/AREA URNS AUTO: ABNORMAL /HPF
BASOPHILS # BLD AUTO: 0.03 X10*3/UL (ref 0–0.1)
BASOPHILS NFR BLD AUTO: 0.4 %
BILIRUB SERPL-MCNC: 0.5 MG/DL (ref 0–1.2)
BILIRUB UR STRIP.AUTO-MCNC: NEGATIVE MG/DL
BILIRUB UR STRIP.AUTO-MCNC: NEGATIVE MG/DL
BUN SERPL-MCNC: 11 MG/DL (ref 6–23)
CALCIUM SERPL-MCNC: 9.3 MG/DL (ref 8.6–10.3)
CHLORIDE SERPL-SCNC: 104 MMOL/L (ref 98–107)
CO2 SERPL-SCNC: 27 MMOL/L (ref 21–32)
COLOR UR: NORMAL
COLOR UR: YELLOW
CREAT SERPL-MCNC: 0.66 MG/DL (ref 0.5–1.05)
EOSINOPHIL # BLD AUTO: 0.13 X10*3/UL (ref 0–0.7)
EOSINOPHIL NFR BLD AUTO: 1.8 %
ERYTHROCYTE [DISTWIDTH] IN BLOOD BY AUTOMATED COUNT: 11.6 % (ref 11.5–14.5)
GFR SERPL CREATININE-BSD FRML MDRD: >90 ML/MIN/1.73M*2
GLUCOSE SERPL-MCNC: 90 MG/DL (ref 74–99)
GLUCOSE UR STRIP.AUTO-MCNC: NEGATIVE MG/DL
GLUCOSE UR STRIP.AUTO-MCNC: NEGATIVE MG/DL
HCG UR QL IA.RAPID: NEGATIVE
HCT VFR BLD AUTO: 36.3 % (ref 36–46)
HGB BLD-MCNC: 12 G/DL (ref 12–16)
IMM GRANULOCYTES # BLD AUTO: 0.02 X10*3/UL (ref 0–0.7)
IMM GRANULOCYTES NFR BLD AUTO: 0.3 % (ref 0–0.9)
KETONES UR STRIP.AUTO-MCNC: NEGATIVE MG/DL
KETONES UR STRIP.AUTO-MCNC: NEGATIVE MG/DL
LACTATE SERPL-SCNC: 0.9 MMOL/L (ref 0.4–2)
LEUKOCYTE ESTERASE UR QL STRIP.AUTO: NEGATIVE
LEUKOCYTE ESTERASE UR QL STRIP.AUTO: NEGATIVE
LIPASE SERPL-CCNC: 38 U/L (ref 9–82)
LYMPHOCYTES # BLD AUTO: 1.57 X10*3/UL (ref 1.2–4.8)
LYMPHOCYTES NFR BLD AUTO: 21.7 %
MCH RBC QN AUTO: 30.8 PG (ref 26–34)
MCHC RBC AUTO-ENTMCNC: 33.1 G/DL (ref 32–36)
MCV RBC AUTO: 93 FL (ref 80–100)
MONOCYTES # BLD AUTO: 0.56 X10*3/UL (ref 0.1–1)
MONOCYTES NFR BLD AUTO: 7.7 %
MUCOUS THREADS #/AREA URNS AUTO: ABNORMAL /LPF
NEUTROPHILS # BLD AUTO: 4.93 X10*3/UL (ref 1.2–7.7)
NEUTROPHILS NFR BLD AUTO: 68.1 %
NITRITE UR QL STRIP.AUTO: NEGATIVE
NITRITE UR QL STRIP.AUTO: NEGATIVE
NRBC BLD-RTO: 0 /100 WBCS (ref 0–0)
PH UR STRIP.AUTO: 5 [PH]
PH UR STRIP.AUTO: 7 [PH]
PLATELET # BLD AUTO: 234 X10*3/UL (ref 150–450)
POC APPEARANCE, URINE: CLEAR
POC BILIRUBIN, URINE: NEGATIVE
POC BLOOD, URINE: ABNORMAL
POC COLOR, URINE: YELLOW
POC GLUCOSE, URINE: NEGATIVE MG/DL
POC KETONES, URINE: NEGATIVE MG/DL
POC LEUKOCYTES, URINE: NEGATIVE
POC NITRITE,URINE: NEGATIVE
POC PH, URINE: 6 PH
POC PROTEIN, URINE: NEGATIVE MG/DL
POC SPECIFIC GRAVITY, URINE: 1.02
POC UROBILINOGEN, URINE: 0.2 EU/DL
POTASSIUM SERPL-SCNC: 3.9 MMOL/L (ref 3.5–5.3)
PROT SERPL-MCNC: 6.6 G/DL (ref 6.4–8.2)
PROT UR STRIP.AUTO-MCNC: ABNORMAL MG/DL
PROT UR STRIP.AUTO-MCNC: NEGATIVE MG/DL
RBC # BLD AUTO: 3.9 X10*6/UL (ref 4–5.2)
RBC # UR STRIP.AUTO: NEGATIVE /UL
RBC # UR STRIP.AUTO: NEGATIVE /UL
RBC #/AREA URNS AUTO: ABNORMAL /HPF
SODIUM SERPL-SCNC: 136 MMOL/L (ref 136–145)
SP GR UR STRIP.AUTO: 1.01
SP GR UR STRIP.AUTO: 1.03
SQUAMOUS #/AREA URNS AUTO: ABNORMAL /HPF
UROBILINOGEN UR STRIP.AUTO-MCNC: <2 MG/DL
UROBILINOGEN UR STRIP.AUTO-MCNC: <2 MG/DL
WBC # BLD AUTO: 7.2 X10*3/UL (ref 4.4–11.3)
WBC #/AREA URNS AUTO: ABNORMAL /HPF
YEAST BUDDING #/AREA UR COMP ASSIST: PRESENT /HPF

## 2024-01-03 PROCEDURE — 96361 HYDRATE IV INFUSION ADD-ON: CPT

## 2024-01-03 PROCEDURE — 2500000004 HC RX 250 GENERAL PHARMACY W/ HCPCS (ALT 636 FOR OP/ED): Performed by: NURSE PRACTITIONER

## 2024-01-03 PROCEDURE — 3074F SYST BP LT 130 MM HG: CPT | Performed by: INTERNAL MEDICINE

## 2024-01-03 PROCEDURE — 99284 EMERGENCY DEPT VISIT MOD MDM: CPT

## 2024-01-03 PROCEDURE — 81003 URINALYSIS AUTO W/O SCOPE: CPT | Performed by: NURSE PRACTITIONER

## 2024-01-03 PROCEDURE — 99214 OFFICE O/P EST MOD 30 MIN: CPT | Performed by: INTERNAL MEDICINE

## 2024-01-03 PROCEDURE — 96375 TX/PRO/DX INJ NEW DRUG ADDON: CPT

## 2024-01-03 PROCEDURE — 83605 ASSAY OF LACTIC ACID: CPT | Performed by: NURSE PRACTITIONER

## 2024-01-03 PROCEDURE — 2500000005 HC RX 250 GENERAL PHARMACY W/O HCPCS: Performed by: EMERGENCY MEDICINE

## 2024-01-03 PROCEDURE — 83690 ASSAY OF LIPASE: CPT | Performed by: NURSE PRACTITIONER

## 2024-01-03 PROCEDURE — 2500000001 HC RX 250 WO HCPCS SELF ADMINISTERED DRUGS (ALT 637 FOR MEDICARE OP): Performed by: EMERGENCY MEDICINE

## 2024-01-03 PROCEDURE — 81003 URINALYSIS AUTO W/O SCOPE: CPT | Performed by: INTERNAL MEDICINE

## 2024-01-03 PROCEDURE — 2550000001 HC RX 255 CONTRASTS: Performed by: NURSE PRACTITIONER

## 2024-01-03 PROCEDURE — 3078F DIAST BP <80 MM HG: CPT | Performed by: INTERNAL MEDICINE

## 2024-01-03 PROCEDURE — 74177 CT ABD & PELVIS W/CONTRAST: CPT | Mod: FOREIGN READ | Performed by: RADIOLOGY

## 2024-01-03 PROCEDURE — 74177 CT ABD & PELVIS W/CONTRAST: CPT | Mod: FR

## 2024-01-03 PROCEDURE — 96374 THER/PROPH/DIAG INJ IV PUSH: CPT | Mod: 59

## 2024-01-03 PROCEDURE — 80053 COMPREHEN METABOLIC PANEL: CPT | Performed by: NURSE PRACTITIONER

## 2024-01-03 PROCEDURE — 36415 COLL VENOUS BLD VENIPUNCTURE: CPT | Performed by: NURSE PRACTITIONER

## 2024-01-03 PROCEDURE — 81001 URINALYSIS AUTO W/SCOPE: CPT | Performed by: EMERGENCY MEDICINE

## 2024-01-03 PROCEDURE — 81025 URINE PREGNANCY TEST: CPT | Performed by: NURSE PRACTITIONER

## 2024-01-03 PROCEDURE — 85025 COMPLETE CBC W/AUTO DIFF WBC: CPT | Performed by: NURSE PRACTITIONER

## 2024-01-03 PROCEDURE — 1036F TOBACCO NON-USER: CPT | Performed by: INTERNAL MEDICINE

## 2024-01-03 RX ORDER — CEPHALEXIN 500 MG/1
500 CAPSULE ORAL 2 TIMES DAILY
Qty: 10 CAPSULE | Refills: 0 | Status: SHIPPED | OUTPATIENT
Start: 2024-01-03 | End: 2024-01-08

## 2024-01-03 RX ORDER — ONDANSETRON 4 MG/1
4 TABLET, ORALLY DISINTEGRATING ORAL ONCE
Status: COMPLETED | OUTPATIENT
Start: 2024-01-03 | End: 2024-01-03

## 2024-01-03 RX ORDER — CEPHALEXIN 500 MG/1
500 CAPSULE ORAL ONCE
Status: COMPLETED | OUTPATIENT
Start: 2024-01-03 | End: 2024-01-03

## 2024-01-03 RX ORDER — ONDANSETRON HYDROCHLORIDE 2 MG/ML
4 INJECTION, SOLUTION INTRAVENOUS ONCE
Status: COMPLETED | OUTPATIENT
Start: 2024-01-03 | End: 2024-01-03

## 2024-01-03 RX ORDER — KETOROLAC TROMETHAMINE 10 MG/1
10 TABLET, FILM COATED ORAL EVERY 6 HOURS PRN
Qty: 12 TABLET | Refills: 0 | Status: SHIPPED | OUTPATIENT
Start: 2024-01-03 | End: 2024-01-06

## 2024-01-03 RX ORDER — KETOROLAC TROMETHAMINE 30 MG/ML
15 INJECTION, SOLUTION INTRAMUSCULAR; INTRAVENOUS ONCE
Status: COMPLETED | OUTPATIENT
Start: 2024-01-03 | End: 2024-01-03

## 2024-01-03 RX ORDER — FLUCONAZOLE 150 MG/1
150 TABLET ORAL ONCE
Status: COMPLETED | OUTPATIENT
Start: 2024-01-03 | End: 2024-01-03

## 2024-01-03 RX ORDER — ONDANSETRON 4 MG/1
4 TABLET, ORALLY DISINTEGRATING ORAL EVERY 8 HOURS PRN
Qty: 20 TABLET | Refills: 0 | Status: SHIPPED | OUTPATIENT
Start: 2024-01-03

## 2024-01-03 RX ADMIN — KETOROLAC TROMETHAMINE 15 MG: 30 INJECTION, SOLUTION INTRAMUSCULAR; INTRAVENOUS at 15:03

## 2024-01-03 RX ADMIN — IOHEXOL 68 ML: 350 INJECTION, SOLUTION INTRAVENOUS at 15:38

## 2024-01-03 RX ADMIN — FLUCONAZOLE 150 MG: 150 TABLET ORAL at 00:54

## 2024-01-03 RX ADMIN — ONDANSETRON 4 MG: 2 INJECTION INTRAMUSCULAR; INTRAVENOUS at 15:03

## 2024-01-03 RX ADMIN — ONDANSETRON 4 MG: 4 TABLET, ORALLY DISINTEGRATING ORAL at 00:54

## 2024-01-03 RX ADMIN — SODIUM CHLORIDE 1000 ML: 9 INJECTION, SOLUTION INTRAVENOUS at 15:02

## 2024-01-03 RX ADMIN — CEPHALEXIN 500 MG: 500 CAPSULE ORAL at 00:54

## 2024-01-03 ASSESSMENT — ENCOUNTER SYMPTOMS
WHEEZING: 0
COUGH: 0
RHINORRHEA: 0
AGITATION: 0
CONSTIPATION: 1
VOMITING: 0
BACK PAIN: 0
WEAKNESS: 0
SHORTNESS OF BREATH: 0
CHILLS: 0
PSYCHIATRIC NEGATIVE: 1
NEUROLOGICAL NEGATIVE: 1
DIZZINESS: 0
PALPITATIONS: 0
DYSURIA: 1
ENDOCRINE NEGATIVE: 1
HEADACHES: 0
NAUSEA: 0
FEVER: 0
ABDOMINAL DISTENTION: 0
ABDOMINAL PAIN: 1
FLANK PAIN: 1
LIGHT-HEADEDNESS: 0
DIARRHEA: 0
EYES NEGATIVE: 1
CARDIOVASCULAR NEGATIVE: 1

## 2024-01-03 ASSESSMENT — COLUMBIA-SUICIDE SEVERITY RATING SCALE - C-SSRS
6. HAVE YOU EVER DONE ANYTHING, STARTED TO DO ANYTHING, OR PREPARED TO DO ANYTHING TO END YOUR LIFE?: NO
1. IN THE PAST MONTH, HAVE YOU WISHED YOU WERE DEAD OR WISHED YOU COULD GO TO SLEEP AND NOT WAKE UP?: NO
2. HAVE YOU ACTUALLY HAD ANY THOUGHTS OF KILLING YOURSELF?: NO

## 2024-01-03 ASSESSMENT — PAIN - FUNCTIONAL ASSESSMENT
PAIN_FUNCTIONAL_ASSESSMENT: 0-10

## 2024-01-03 ASSESSMENT — PAIN SCALES - GENERAL
PAINLEVEL_OUTOF10: 7
PAINLEVEL_OUTOF10: 8
PAINLEVEL_OUTOF10: 4

## 2024-01-03 ASSESSMENT — PAIN DESCRIPTION - LOCATION: LOCATION: ABDOMEN

## 2024-01-03 NOTE — ED PROVIDER NOTES
Is a 23-year-old female chief complaint of dysuria, suprapubic discomfort and pain into the right flank with urination.  She did just passed a kidney stone.  Denies any fevers or chills however does have some nausea.  No chance of pregnancy per patient.         Review of Systems     Physical Exam  Vitals and nursing note reviewed.   Constitutional:       General: She is not in acute distress.     Appearance: She is well-developed.   HENT:      Head: Normocephalic and atraumatic.   Eyes:      Conjunctiva/sclera: Conjunctivae normal.   Cardiovascular:      Rate and Rhythm: Normal rate and regular rhythm.      Heart sounds: No murmur heard.  Pulmonary:      Effort: Pulmonary effort is normal. No respiratory distress.      Breath sounds: Normal breath sounds.   Abdominal:      Palpations: Abdomen is soft.      Tenderness: There is no abdominal tenderness.   Musculoskeletal:         General: No swelling.      Cervical back: Neck supple.   Skin:     General: Skin is warm and dry.      Capillary Refill: Capillary refill takes less than 2 seconds.   Neurological:      Mental Status: She is alert.   Psychiatric:         Mood and Affect: Mood normal.          Labs Reviewed   URINALYSIS WITH REFLEX CULTURE AND MICROSCOPIC - Abnormal       Result Value    Color, Urine Yellow      Appearance, Urine Hazy (*)     Specific Gravity, Urine 1.026      pH, Urine 5.0      Protein, Urine 30 (1+) (*)     Glucose, Urine NEGATIVE      Blood, Urine NEGATIVE      Ketones, Urine NEGATIVE      Bilirubin, Urine NEGATIVE      Urobilinogen, Urine <2.0      Nitrite, Urine NEGATIVE      Leukocyte Esterase, Urine NEGATIVE     URINALYSIS MICROSCOPIC WITH REFLEX CULTURE - Abnormal    WBC, Urine 1-5      RBC, Urine 1-2      Squamous Epithelial Cells, Urine 1-9 (SPARSE)      Bacteria, Urine 2+ (*)     Budding Yeast, Urine PRESENT (*)     Mucus, Urine 3+     URINALYSIS WITH REFLEX CULTURE AND MICROSCOPIC    Narrative:     The following orders were created  for panel order Urinalysis with Reflex Culture and Microscopic.  Procedure                               Abnormality         Status                     ---------                               -----------         ------                     Urinalysis with Reflex C...[[id number]]  Abnormal            Final result               Extra Urine Gray Tube[[id number]]                                                         Please view results for these tests on the individual orders.   EXTRA URINE GRAY TUBE        No orders to display        Procedures     Medical Decision Making  Patient is a 23-year-old female with history of kidney stones and recently passed 1 presents with some right-sided flank pain associated with urination, as well as some dysuria.  No fevers or chills.  Urinalysis is a little concerning for infection bacterial and fungal.  Will treat with fluconazole, Keflex and Zofran.  Patient can be discharged.         Diagnoses as of 01/03/24 0234   Acute cystitis without hematuria   Vaginal yeast infection                    Rodrigue Washington MD  01/03/24 0046       Rodrigue Washington MD  01/03/24 0234

## 2024-01-03 NOTE — PROGRESS NOTES
"Subjective   Patient ID: Jessi Alvarado is a 23 y.o. female who presents for Hip Pain (C/O RIGHT SIDE PAIN. WENT TO THE ER YESTERDAY FOR SYMP AND HER SYMP HAVE GOTTEN WORSE, STATES IT FEELS LIKE HER \"KIDNEYS ARE TWISTING\" WHEN SHE URINATES. HASN'T HAD A BM SINCE LAST THURSDAY).  HPI  F/U ON EGD ND COLONOSCOPY . F/U AFTER ER VISIT EARLY THIS MORNING FOR R FLANK PAIN . HAD UA DONE AND WAS SENT HOME ON KEFLEX (WHICH HASN'T PICKED UP YET). COMPLAINS OF R FLANK PAIN 10/10 , DIFFICULTY WITH PAIN UPON URINATING . .ABDMINAL PAIN (RLQ,LLQ) WITH CONSTIPATION (LAST BOWEL MOVEMENT WAS 1 WEEK AGO) . HAS BEEN TAKING OTC NSAIDS (MOTRIN) ROUTINELY FOR THE PAST 2-3 WEEKS.  Review of Systems   Constitutional:  Negative for chills and fever.   HENT: Negative.  Negative for congestion, postnasal drip and rhinorrhea.    Eyes: Negative.  Negative for visual disturbance.   Respiratory:  Negative for cough, shortness of breath and wheezing.    Cardiovascular: Negative.  Negative for chest pain, palpitations and leg swelling.   Gastrointestinal:  Positive for abdominal pain and constipation. Negative for abdominal distention, diarrhea, nausea and vomiting.   Endocrine: Negative.    Genitourinary:  Positive for dysuria and flank pain. Negative for urgency.   Musculoskeletal:  Negative for back pain.   Skin: Negative.  Negative for rash.   Allergic/Immunologic: Negative for immunocompromised state.   Neurological: Negative.  Negative for dizziness, weakness, light-headedness and headaches.   Psychiatric/Behavioral: Negative.  Negative for agitation.        Objective   Physical Exam  Constitutional:       General: She is not in acute distress.  HENT:      Head: Normocephalic.      Nose: Nose normal.      Mouth/Throat:      Mouth: Mucous membranes are moist.   Eyes:      Conjunctiva/sclera: Conjunctivae normal.      Pupils: Pupils are equal, round, and reactive to light.   Cardiovascular:      Rate and Rhythm: Normal rate and regular " rhythm.      Pulses: Normal pulses.      Heart sounds: Normal heart sounds.   Pulmonary:      Effort: No respiratory distress.      Breath sounds: No wheezing.   Chest:      Chest wall: No tenderness.   Abdominal:      General: Abdomen is flat. Bowel sounds are normal.      Palpations: Abdomen is soft.      Tenderness: There is abdominal tenderness.      Comments: EPIGASTRIC , LLQ AND RLQ TENDERNESS, B/L FLANK TENDERNESS   Musculoskeletal:         General: No tenderness. Normal range of motion.      Cervical back: Normal range of motion.   Lymphadenopathy:      Cervical: No cervical adenopathy.   Skin:     General: Skin is warm and dry.      Findings: No rash.   Neurological:      General: No focal deficit present.      Mental Status: She is alert. Mental status is at baseline.   Psychiatric:         Mood and Affect: Mood normal.         Behavior: Behavior normal.         Assessment/Plan   1. Flank pain        2. Abdominal pain, epigastric        3. Abdominal pain, RLQ        4. Abdominal pain, LLQ        5. Constipation, unspecified constipation type        6. Dysuria  POCT UA Automated manually resulted        TEST RESULTS WERE DISCUSSED.  EXPLAINED THE POSSIBILITY OF KIDNEY STONE AND THE NEED FOR MORE EVALUATIONS FOR POSSIBLE COLITIS .  ADVISED TO ADD MORE FIBER TO DIET AND TO DRINK MORE WATER.  ADVISED TO CUT DOWN CAFFEINE.    MDM    1) COMPLEXITY: 1 UNDIAGNOSED NEW PROBLEM WITH UNCERTAIN PROGNOSIS  2)DATA: TESTS INTERPRETED AND OR ORDERED, TOOK INDEPENDENT HISTORY OR RECORDS REVIEWED  3)RISK: MODERATE RISK DUE TO NATURE OF MEDICAL CONDITIONS/COMORBIDITY OR MEDICATIONS ORDERED OR SURGICAL OR PROCEDURE REFERRAL, .         Pt is sent to ER.

## 2024-01-03 NOTE — ED PROVIDER NOTES
Chief Complaint   Patient presents with    Abdominal Pain     To ED per wheelchair c/o R abd and R flank pain started yesterday. She reports that she thinks she may have had a kidney stone recently and was here last pm and diagnosed with UTI and yeast infection. Her PCP wanted her to come back to ER for CT scan because pain has gotten worse.         Patient History    Past Medical History:   Diagnosis Date    Attention-deficit hyperactivity disorder, predominantly hyperactive type     Attention deficit hyperactivity disorder (ADHD), predominantly hyperactive type    Concussion with no loss of consciousness 03/02/2018    Contusion of left hand 04/18/2023    Groin abscess 04/18/2023    Hand sprain, left, sequela 04/18/2023    Hematuria 04/18/2023    Left wrist pain 04/18/2023    Left wrist sprain, sequela 04/18/2023    Lump of skin 04/18/2023    Numbness of fingers 04/18/2023    Other conditions influencing health status 09/23/2020    Menarche    Personal history of other diseases of the female genital tract     History of ovarian cyst    Personal history of other diseases of the respiratory system     History of asthma    Post-op pain 04/18/2023    Right knee pain 04/18/2023    Stiffness of right knee, not elsewhere classified 04/18/2023      Past Surgical History:   Procedure Laterality Date    KNEE SURGERY      WISDOM TOOTH EXTRACTION        Family History   Problem Relation Name Age of Onset    No Known Problems Mother      ADD / ADHD Father      Cancer Maternal Grandmother      Hypertension Maternal Grandfather      IRMA disease Paternal Grandmother      Irritable bowel syndrome Paternal Grandmother      Hypertension Paternal Grandfather        Social History     Social History Narrative    Not on file      Allergies   Allergen Reactions    Cinnamon Shortness of breath     cinnamon    Pomegranate Fruit Extract Shortness of breath     pomagranate    Sulfa (Sulfonamide Antibiotics) Other     "Sulfamethoxazole-Trimethoprim Hives and Other        PMH: Reviewed  PSH: Reviewed  Social History: Reviewed.   Allergies reviewed.     HPI: Jessi Alvarado is a 23 y.o. female who presents to the ED today unaccompanied with complaints of right-sided abdominal and flank pain.  States her symptoms started yesterday.  Constant sharp pain, \"twisting my kidney.\"  Associated nausea, one episode of vomiting.  No fevers. Unknown LMP with nexplanon use.      REVIEW OF SYSTEMS:  All other systems reviewed and negative except as listed in HPI.    PHYSICAL EXAM:    GENERAL: Vitals noted, no distress. Alert and oriented x 3. Non-toxic.      EENT: TMs clear. Posterior oropharynx unremarkable. EOMI, no nystagmus noted.     NECK: Supple. No masses. No midline tenderness. No meningeal signs.     CARDIAC: Regular rate, rhythm. No murmurs rubs or gallops. No JVD.    PULMONARY: Lungs clear and equal bilaterally. No wheezes rales or rhonchi. No respiratory distress.     ABDOMEN: Soft, nondistended, and tender in the right flank to palpation. No peritoneal signs. Bowel sounds are present and normoactive in all 4 quadrants. No pulsatile masses.     EXTREMITIES: No peripheral edema.     SKIN: No rash. Warm, dry, and intact.     NEURO: No focal neurologic deficits.     Labs Reviewed   COMPREHENSIVE METABOLIC PANEL - Abnormal       Result Value    Glucose 90      Sodium 136      Potassium 3.9      Chloride 104      Bicarbonate 27      Anion Gap 9 (*)     Urea Nitrogen 11      Creatinine 0.66      eGFR >90      Calcium 9.3      Albumin 4.3      Alkaline Phosphatase 42      Total Protein 6.6      AST 15      Bilirubin, Total 0.5      ALT 10     CBC WITH AUTO DIFFERENTIAL - Abnormal    WBC 7.2      nRBC 0.0      RBC 3.90 (*)     Hemoglobin 12.0      Hematocrit 36.3      MCV 93      MCH 30.8      MCHC 33.1      RDW 11.6      Platelets 234      Neutrophils % 68.1      Immature Granulocytes %, Automated 0.3      Lymphocytes % 21.7      " Monocytes % 7.7      Eosinophils % 1.8      Basophils % 0.4      Neutrophils Absolute 4.93      Immature Granulocytes Absolute, Automated 0.02      Lymphocytes Absolute 1.57      Monocytes Absolute 0.56      Eosinophils Absolute 0.13      Basophils Absolute 0.03     LIPASE - Normal    Lipase 38      Narrative:     Venipuncture immediately after or during the administration of Metamizole may lead to falsely low results. Testing should be performed immediately prior to Metamizole dosing.   LACTATE - Normal    Lactate 0.9      Narrative:     Venipuncture immediately after or during the administration of Metamizole may lead to falsely low results. Testing should be performed immediately  prior to Metamizole dosing.   HCG, URINE, QUALITATIVE - Normal    HCG, Urine NEGATIVE     URINALYSIS WITH REFLEX CULTURE AND MICROSCOPIC - Normal    Color, Urine Straw      Appearance, Urine Clear      Specific Gravity, Urine 1.008      pH, Urine 7.0      Protein, Urine NEGATIVE      Glucose, Urine NEGATIVE      Blood, Urine NEGATIVE      Ketones, Urine NEGATIVE      Bilirubin, Urine NEGATIVE      Urobilinogen, Urine <2.0      Nitrite, Urine NEGATIVE      Leukocyte Esterase, Urine NEGATIVE     URINALYSIS WITH REFLEX CULTURE AND MICROSCOPIC    Narrative:     The following orders were created for panel order Urinalysis with Reflex Culture and Microscopic.  Procedure                               Abnormality         Status                     ---------                               -----------         ------                     Urinalysis with Reflex C...[[id number]]  Normal              Final result               Extra Urine Gray Tube[[id number]]                            In process                   Please view results for these tests on the individual orders.   EXTRA URINE GRAY TUBE        CT abdomen pelvis w IV contrast   Final Result   Again seen is a 4 mm nonobstructing calculus in the central collecting   system on the right kidney  but the CT abdomen and pelvis otherwise is   unremarkable.  There is no evidence of hydronephrosis..   Signed by Jw Barker MD           Medical Decision Making         ED COURSE: This patient was seen and examined by myself independently.  Patient has an IV established.  Labs are obtained and are noted above.  Urine pregnancy test is negative.  Urinalysis is negative for infection.  She is hydrated with normal saline 1 L bolus and medicated with Toradol and Zofran.  Pain improved on repeat evaluation.  CT scan shows a 4 mm nonobstructing calculus in the central collecting system of the right kidney but the CT abdomen pelvis otherwise is unremarkable.  No evidence of hydronephrosis.  She is made aware these findings.  Offered Toradol prescription for home and she is in agreement.  Recommended follow-up care with primary care physician in 1 week.  Return to the ED for any new or worsening symptoms.  Discharged home in stable condition with computer instructions given.             Differential Diagnoses Considered: Kidney stone, UTI, pregnancy, musculoskeletal flank pain, appendicitis    Chronic Medical Conditions Significantly Affecting Care: None    External Records Reviewed: I reviewed recent and relevant outside records including: PCP notes, prior discharge summary, previous radiologic studies    Diagnostic testing considered: Blood, urine, CT abdomen pelvis    Escalation of Care: Appropriate for outpatient management    Prescription Drug Consideration: pain medications        DIAGNOSTIC IMPRESSION: #1 right flank pain     Rosa Lynn, LAY-CNP  01/03/24 1700

## 2024-01-03 NOTE — Clinical Note
Jessi Alvarado was seen and treated in our emergency department on 1/3/2024.  She may return to work on 01/04/2024.       If you have any questions or concerns, please don't hesitate to call.      Rosa Lynn, APRN-CNP

## 2024-01-04 LAB — HOLD SPECIMEN: NORMAL

## 2024-01-05 ENCOUNTER — ANCILLARY PROCEDURE (OUTPATIENT)
Dept: RADIOLOGY | Facility: CLINIC | Age: 24
End: 2024-01-05
Payer: COMMERCIAL

## 2024-01-05 ENCOUNTER — OFFICE VISIT (OUTPATIENT)
Dept: ORTHOPEDIC SURGERY | Facility: CLINIC | Age: 24
End: 2024-01-05
Payer: COMMERCIAL

## 2024-01-05 DIAGNOSIS — S60.011A CONTUSION OF RIGHT THUMB WITHOUT DAMAGE TO NAIL, INITIAL ENCOUNTER: ICD-10-CM

## 2024-01-05 DIAGNOSIS — S63.681A OTHER SPRAIN OF RIGHT THUMB, INITIAL ENCOUNTER: ICD-10-CM

## 2024-01-05 DIAGNOSIS — S63.681A OTHER SPRAIN OF RIGHT THUMB, INITIAL ENCOUNTER: Primary | ICD-10-CM

## 2024-01-05 PROCEDURE — L3809 WHFO W/O JOINTS PRE OTS: HCPCS | Performed by: NURSE PRACTITIONER

## 2024-01-05 PROCEDURE — 99214 OFFICE O/P EST MOD 30 MIN: CPT | Performed by: NURSE PRACTITIONER

## 2024-01-05 PROCEDURE — 73130 X-RAY EXAM OF HAND: CPT | Mod: RIGHT SIDE | Performed by: RADIOLOGY

## 2024-01-05 PROCEDURE — 73130 X-RAY EXAM OF HAND: CPT | Mod: RT

## 2024-01-05 ASSESSMENT — ENCOUNTER SYMPTOMS
HEMATOLOGIC/LYMPHATIC NEGATIVE: 1
PSYCHIATRIC NEGATIVE: 1
CONSTITUTIONAL NEGATIVE: 1
ENDOCRINE NEGATIVE: 1
CARDIOVASCULAR NEGATIVE: 1
NEUROLOGICAL NEGATIVE: 1
ARTHRALGIAS: 1
RESPIRATORY NEGATIVE: 1

## 2024-01-05 ASSESSMENT — PAIN SCALES - GENERAL: PAINLEVEL_OUTOF10: 6

## 2024-01-05 ASSESSMENT — PAIN DESCRIPTION - DESCRIPTORS: DESCRIPTORS: TINGLING;NUMBNESS

## 2024-01-05 ASSESSMENT — PAIN - FUNCTIONAL ASSESSMENT: PAIN_FUNCTIONAL_ASSESSMENT: 0-10

## 2024-01-05 NOTE — ASSESSMENT & PLAN NOTE
Sx control with OTC NSAIDs per package directions, take with food, Tylenol prn.   Patient was fitted in a thumb wrist spica brace with good fit and support, distal neurovascular intact post brace application  Thumb/wrist bracing with repetitive or aggravating activities, may remove with rest and sleep as tolerated  Outpatient hand x-ray today  Activity restriction recommended: Use of right hand with brace as tolerated, [employer] 14 initiated at today's visit  Follow up here 2 weeks and do not anticipate additional imaging , sooner for changes or concerns.    Patient in agreement with plan of care.  This note was generated using Dragon software.  It may contain errors in wording, punctuation or spelling.

## 2024-01-05 NOTE — PROGRESS NOTES
/Subjective    Patient ID: Jessi Alvarado is a 23 y.o. female.    Chief Complaint: Pain of the Right Hand     [facility] inj DOI 12/19/2023    HPI  Jessi is a pleasant 23-year-old female presenting today for initial evaluation from ED visit for R thumb injury at work   12/19/2023 metal bar struck outer R thumb  Ibu prn helps  Thumb splint from [facility] helps but is coming apart  RH dominant    Review of Systems   Constitutional: Negative.    HENT: Negative.     Respiratory: Negative.     Cardiovascular: Negative.    Endocrine: Negative.    Musculoskeletal:  Positive for arthralgias.   Skin: Negative.    Neurological: Negative.    Hematological: Negative.    Psychiatric/Behavioral: Negative.         Objective   Right Hand Exam     Tenderness   Right hand tenderness location: 1st metacarpal region into thenar region and CMC joint.    Range of Motion   The patient has normal right wrist ROM.     Tests   Finkelstein's test: positive    Other   Erythema: present  Sensation: normal  Pulse: present    Comments:  Skin is pink, warm, dry and intact.  Patient with good range of motion of the wrist in all directions, mild aggravation with radial and ulnar deviation.  Mildly positive Finkelstein's.  There is mild swelling noted to the first metacarpal into the thenar region.  Patient is tender on palpation of the first metacarpal, MCP and CMC joints.  Distal motor and sensory intact, cap refill at 2 seconds.  Tendon function is intact in all directions with no laxity noted, + CMC grind test.          Image Results:  === 12/19/23 ===    XR HAND 3+ VIEWS RIGHT    - Impression -  No acute osseous abnormalities.  Signed by Yanick Eubanks MD     Repeat and x-ray obtained outpatient on date of visit.  Independent review reveals no acute fracture or misalignment.  There is mild soft tissue edema to the webspace between thumb and index finger which appears improved from prior imaging on 12/19/2023.  Await radiology  report.    Assessment/Plan   Encounter Diagnoses:  Problem List Items Addressed This Visit             ICD-10-CM    Contusion of right thumb without damage to nail S60.011A     Sx control with OTC NSAIDs per package directions, take with food, Tylenol prn.   Patient was fitted in a thumb wrist spica brace with good fit and support, distal neurovascular intact post brace application  Thumb/wrist bracing with repetitive or aggravating activities, may remove with rest and sleep as tolerated  Outpatient hand x-ray today  Activity restriction recommended: Use of right hand with brace as tolerated, [employer] 14 initiated at today's visit  Follow up here 2 weeks and do not anticipate additional imaging , sooner for changes or concerns.    Patient in agreement with plan of care.  This note was generated using Dragon software.  It may contain errors in wording, punctuation or spelling.         Relevant Orders    Follow Up In Orthopaedic Surgery    XR hand right 3+ views    Other sprain of right thumb, initial encounter - Primary S63.681A    Relevant Orders    Follow Up In Orthopaedic Surgery    XR hand right 3+ views

## 2024-01-17 ENCOUNTER — HOSPITAL ENCOUNTER (EMERGENCY)
Facility: HOSPITAL | Age: 24
Discharge: HOME | End: 2024-01-18
Attending: EMERGENCY MEDICINE
Payer: COMMERCIAL

## 2024-01-17 DIAGNOSIS — R05.9 COUGH, UNSPECIFIED TYPE: Primary | ICD-10-CM

## 2024-01-17 PROCEDURE — 99283 EMERGENCY DEPT VISIT LOW MDM: CPT | Performed by: EMERGENCY MEDICINE

## 2024-01-17 NOTE — Clinical Note
Jessi Alvarado was seen and treated in our emergency department on 1/17/2024.  She may return to work on 01/18/2024.       If you have any questions or concerns, please don't hesitate to call.      Melchor Eubanks, DO

## 2024-01-18 ENCOUNTER — APPOINTMENT (OUTPATIENT)
Dept: RADIOLOGY | Facility: HOSPITAL | Age: 24
End: 2024-01-18
Payer: COMMERCIAL

## 2024-01-18 ENCOUNTER — OFFICE VISIT (OUTPATIENT)
Dept: OBSTETRICS AND GYNECOLOGY | Facility: CLINIC | Age: 24
End: 2024-01-18
Payer: COMMERCIAL

## 2024-01-18 VITALS
HEIGHT: 67 IN | DIASTOLIC BLOOD PRESSURE: 78 MMHG | WEIGHT: 129 LBS | SYSTOLIC BLOOD PRESSURE: 108 MMHG | BODY MASS INDEX: 20.25 KG/M2

## 2024-01-18 VITALS
HEIGHT: 67 IN | BODY MASS INDEX: 19.62 KG/M2 | HEART RATE: 69 BPM | SYSTOLIC BLOOD PRESSURE: 110 MMHG | WEIGHT: 125 LBS | RESPIRATION RATE: 16 BRPM | OXYGEN SATURATION: 99 % | TEMPERATURE: 98.1 F | DIASTOLIC BLOOD PRESSURE: 55 MMHG

## 2024-01-18 DIAGNOSIS — N92.1 MENORRHAGIA WITH IRREGULAR CYCLE: Primary | ICD-10-CM

## 2024-01-18 LAB
FLUAV RNA RESP QL NAA+PROBE: NOT DETECTED
FLUBV RNA RESP QL NAA+PROBE: NOT DETECTED
RSV RNA RESP QL NAA+PROBE: NOT DETECTED
SARS-COV-2 RNA RESP QL NAA+PROBE: NOT DETECTED

## 2024-01-18 PROCEDURE — 99213 OFFICE O/P EST LOW 20 MIN: CPT | Performed by: REGISTERED NURSE

## 2024-01-18 PROCEDURE — 3074F SYST BP LT 130 MM HG: CPT | Performed by: REGISTERED NURSE

## 2024-01-18 PROCEDURE — 1036F TOBACCO NON-USER: CPT | Performed by: REGISTERED NURSE

## 2024-01-18 PROCEDURE — 71045 X-RAY EXAM CHEST 1 VIEW: CPT

## 2024-01-18 PROCEDURE — 87637 SARSCOV2&INF A&B&RSV AMP PRB: CPT | Performed by: EMERGENCY MEDICINE

## 2024-01-18 PROCEDURE — 2500000005 HC RX 250 GENERAL PHARMACY W/O HCPCS: Performed by: EMERGENCY MEDICINE

## 2024-01-18 PROCEDURE — 71045 X-RAY EXAM CHEST 1 VIEW: CPT | Performed by: SURGERY

## 2024-01-18 PROCEDURE — 3078F DIAST BP <80 MM HG: CPT | Performed by: REGISTERED NURSE

## 2024-01-18 RX ORDER — BENZONATATE 100 MG/1
100 CAPSULE ORAL EVERY 8 HOURS
Qty: 21 CAPSULE | Refills: 0 | Status: SHIPPED | OUTPATIENT
Start: 2024-01-18 | End: 2024-01-25

## 2024-01-18 RX ORDER — ONDANSETRON 4 MG/1
4 TABLET, ORALLY DISINTEGRATING ORAL ONCE
Status: COMPLETED | OUTPATIENT
Start: 2024-01-18 | End: 2024-01-18

## 2024-01-18 RX ORDER — ACETAMINOPHEN 325 MG/1
650 TABLET ORAL ONCE
Status: COMPLETED | OUTPATIENT
Start: 2024-01-18 | End: 2024-01-18

## 2024-01-18 RX ADMIN — ONDANSETRON 4 MG: 4 TABLET, ORALLY DISINTEGRATING ORAL at 00:35

## 2024-01-18 RX ADMIN — ACETAMINOPHEN 650 MG: 325 TABLET ORAL at 00:34

## 2024-01-18 ASSESSMENT — PAIN SCALES - GENERAL
PAINLEVEL_OUTOF10: 5 - MODERATE PAIN
PAINLEVEL_OUTOF10: 8

## 2024-01-18 ASSESSMENT — PAIN - FUNCTIONAL ASSESSMENT
PAIN_FUNCTIONAL_ASSESSMENT: 0-10
PAIN_FUNCTIONAL_ASSESSMENT: 0-10

## 2024-01-18 ASSESSMENT — ENCOUNTER SYMPTOMS
PSYCHIATRIC NEGATIVE: 1
CONSTITUTIONAL NEGATIVE: 1

## 2024-01-18 NOTE — ED PROVIDER NOTES
HPI   Chief Complaint   Patient presents with    Cough     Pt states she has had a cough for a couple of days, states her inhaler is not working        Patient presents to the emergency department secondary to cough.  States that her cough began this evening while at work.  Uses an inhaler and states she has been using it more than normal today.  Presents now to be further evaluated.      History provided by:  Patient   used: No                        No data recorded                Patient History   Past Medical History:   Diagnosis Date    Attention-deficit hyperactivity disorder, predominantly hyperactive type     Attention deficit hyperactivity disorder (ADHD), predominantly hyperactive type    Concussion with no loss of consciousness 03/02/2018    Contusion of left hand 04/18/2023    Groin abscess 04/18/2023    Hand sprain, left, sequela 04/18/2023    Hematuria 04/18/2023    Left wrist pain 04/18/2023    Left wrist sprain, sequela 04/18/2023    Lump of skin 04/18/2023    Numbness of fingers 04/18/2023    Other conditions influencing health status 09/23/2020    Menarche    Personal history of other diseases of the female genital tract     History of ovarian cyst    Personal history of other diseases of the respiratory system     History of asthma    Post-op pain 04/18/2023    Right knee pain 04/18/2023    Stiffness of right knee, not elsewhere classified 04/18/2023     Past Surgical History:   Procedure Laterality Date    KNEE SURGERY      WISDOM TOOTH EXTRACTION       Family History   Problem Relation Name Age of Onset    No Known Problems Mother      ADD / ADHD Father      Cancer Maternal Grandmother      Hypertension Maternal Grandfather      IRMA disease Paternal Grandmother      Irritable bowel syndrome Paternal Grandmother      Hypertension Paternal Grandfather       Social History     Tobacco Use    Smoking status: Never     Passive exposure: Never    Smokeless tobacco: Never   Vaping  Use    Vaping Use: Every day    Substances: Nicotine    Devices: Refillable tank   Substance Use Topics    Alcohol use: Not Currently    Drug use: Never       Physical Exam   ED Triage Vitals   Temp Heart Rate Resp BP   01/18/24 0002 01/18/24 0002 01/18/24 0002 01/18/24 0002   36.7 °C (98.1 °F) 83 16 123/72      SpO2 Temp src Heart Rate Source Patient Position   01/18/24 0002 -- 01/18/24 0046 --   100 %  Monitor       BP Location FiO2 (%)     -- --             Physical Exam  Vitals and nursing note reviewed.   Constitutional:       General: She is not in acute distress.     Appearance: Normal appearance. She is normal weight. She is not ill-appearing, toxic-appearing or diaphoretic.   HENT:      Head: Normocephalic and atraumatic.      Right Ear: Tympanic membrane, ear canal and external ear normal. There is no impacted cerumen.      Left Ear: Tympanic membrane, ear canal and external ear normal. There is no impacted cerumen.      Nose: Nose normal. No rhinorrhea.      Mouth/Throat:      Mouth: Mucous membranes are moist.      Pharynx: Oropharynx is clear. No oropharyngeal exudate or posterior oropharyngeal erythema.   Eyes:      Conjunctiva/sclera: Conjunctivae normal.   Neck:      Comments: Trachea is midline  Cardiovascular:      Rate and Rhythm: Normal rate and regular rhythm.      Heart sounds: No murmur heard.  Pulmonary:      Effort: Pulmonary effort is normal.      Breath sounds: Normal breath sounds. No wheezing.   Abdominal:      General: Abdomen is flat. Bowel sounds are normal. There is no distension.      Palpations: Abdomen is soft.      Tenderness: There is no abdominal tenderness.   Musculoskeletal:         General: Normal range of motion.      Cervical back: Normal range of motion.   Skin:     General: Skin is warm and dry.      Findings: No rash.   Neurological:      General: No focal deficit present.      Mental Status: She is alert and oriented to person, place, and time. Mental status is at  baseline.   Psychiatric:         Mood and Affect: Mood normal.         Behavior: Behavior normal.         Thought Content: Thought content normal.         Judgment: Judgment normal.         ED Course & MDM   Diagnoses as of 01/18/24 0701   Cough, unspecified type       Medical Decision Making  Given the patient's well appearance here I feel she can be discharged safely.  Symptoms are consistent with viral URI.  Patient will be prescribed Tessalon Perles and instructed follow-up with her private physician.  Return if worse.        Procedure  Procedures     Melchor Eubanks DO  01/18/24 0702

## 2024-01-18 NOTE — PROGRESS NOTES
Subjective   Patient ID: Jessi Alvarado is a 23 y.o. female who presents for Contraception (Patient is here to get her Nexplanon removed due to soreness and painful when touched. Patient would like to get on Depo or OCP. LMP: 01/11/2024 Approx. ).  HPI  Pt. Reports mom is not comfortable with how Nexplanon works so she wants it out. Pt. With female partner, needs contraception per derm for use with acne meds. Wants to start OCP.     Review of Systems   Constitutional: Negative.    Psychiatric/Behavioral: Negative.         Objective   Physical Exam  Constitutional:       Appearance: Normal appearance.   Neurological:      Mental Status: She is alert.         Assessment/Plan        Reviewed  mechanism of action of Nexplanon vs. OCP and pt. Stated she wanted to proceed with removal. R arm prepped for removal in sterile fashion, lidocaine administered and incision made. Unable to remove device.  Pt. To start ocp and RTO with surgeon for Nexplanon removal  RTO for OCP follow up in 3 mos  Pt. Verbalized understanding    Yoly Logan, LAY-MEJIA, LAY-CNP, DNP 01/18/24 10:07 AM

## 2024-01-19 ENCOUNTER — OFFICE VISIT (OUTPATIENT)
Dept: ORTHOPEDIC SURGERY | Facility: CLINIC | Age: 24
End: 2024-01-19
Payer: COMMERCIAL

## 2024-01-19 DIAGNOSIS — S63.681A OTHER SPRAIN OF RIGHT THUMB, INITIAL ENCOUNTER: ICD-10-CM

## 2024-01-19 DIAGNOSIS — S60.011A CONTUSION OF RIGHT THUMB WITHOUT DAMAGE TO NAIL, INITIAL ENCOUNTER: Primary | ICD-10-CM

## 2024-01-19 PROCEDURE — 99213 OFFICE O/P EST LOW 20 MIN: CPT | Performed by: NURSE PRACTITIONER

## 2024-01-19 PROCEDURE — 1036F TOBACCO NON-USER: CPT | Performed by: NURSE PRACTITIONER

## 2024-01-19 RX ORDER — DICLOFENAC SODIUM 10 MG/G
GEL TOPICAL
Qty: 100 G | Refills: 1 | COMMUNITY
Start: 2024-01-19 | End: 2024-05-13 | Stop reason: WASHOUT

## 2024-01-19 ASSESSMENT — ENCOUNTER SYMPTOMS
HEMATOLOGIC/LYMPHATIC NEGATIVE: 1
PSYCHIATRIC NEGATIVE: 1
ENDOCRINE NEGATIVE: 1
CARDIOVASCULAR NEGATIVE: 1
ARTHRALGIAS: 1
CONSTITUTIONAL NEGATIVE: 1
NEUROLOGICAL NEGATIVE: 1
RESPIRATORY NEGATIVE: 1

## 2024-01-19 ASSESSMENT — PAIN SCALES - GENERAL: PAINLEVEL_OUTOF10: 3

## 2024-01-19 NOTE — ASSESSMENT & PLAN NOTE
Sx control with OTC NSAIDs per package directions, take with food, Tylenol prn.   Continue use of thumb wrist spica brace with repetitive or aggravating activities, may remove with rest and sleep as tolerated  Activity restriction recommended: Use of right hand with brace as tolerated, Medco 14 continued at today's visit  Encouraged twice daily ROM of wrist, hand and fingers, may remove brace with rest and sleep  Follow up here 3-4 weeks , sooner for changes or concerns.    C9 for OT, may begin weaning out of brace as tolerated.   We discussed plan to transition into more activity at the next visit depending on evaluation and review of therapy notes  Patient in agreement with plan of care.  This note was generated using Dragon software.  It may contain errors in wording, punctuation or spelling.

## 2024-01-19 NOTE — PROGRESS NOTES
/Subjective    Patient ID: Jessi Alvarado is a 23 y.o. female.    Chief Complaint: Follow-up of the Right Hand (RT THUMB PAIN BWC)     BWC inj DOI 12/19/2023    HPI  Jessi is a pleasant 23-year-old female presenting today forFUV R thumb injury at work   12/19/2023 metal bar struck outer R thumb  75% improved from last visit  Sx to MCP joint and dorsal carpal rows  Medco   Motrin 600mg approx 1 every other ay, helps  Ice/heat helps  RH dominant    Review of Systems   Constitutional: Negative.    HENT: Negative.     Respiratory: Negative.     Cardiovascular: Negative.    Endocrine: Negative.    Musculoskeletal:  Positive for arthralgias.   Skin: Negative.    Neurological: Negative.    Hematological: Negative.    Psychiatric/Behavioral: Negative.         Objective   Right Hand Exam     Tenderness   Right hand tenderness location: 1st metacarpal region into thenar region.    Range of Motion   The patient has normal right wrist ROM.     Tests   Finkelstein's test: negative    Other   Erythema: absent  Sensation: normal  Pulse: present    Comments:  Skin is pink, warm, dry and intact.  Patient with good range of motion of the wrist in all directions, minimal aggravation with radial and ulnar deviation.  Negative Finkelstein's.  There is mild swelling noted to the first metacarpal into the thenar region.  Patient is tender on palpation of the  MCP thumb joint.  Distal motor and sensory intact, cap refill at 2 seconds.  Tendon function is intact in all directions with no laxity noted            Image Results:  === 12/19/23 ===    XR HAND 3+ VIEWS RIGHT    - Impression -  No acute osseous abnormalities.  Signed by Yanick Eubanks MD     Repeat and x-ray obtained outpatient on date of visit.  Independent review reveals no acute fracture or misalignment.  There is mild soft tissue edema to the webspace between thumb and index finger which appears improved from prior imaging on 12/19/2023.  Await radiology  report.    Assessment/Plan   Encounter Diagnoses:  Problem List Items Addressed This Visit             ICD-10-CM    Contusion of right thumb without damage to nail - Primary S60.011A     Sx control with OTC NSAIDs per package directions, take with food, Tylenol prn.   Continue use of thumb wrist spica brace with repetitive or aggravating activities, may remove with rest and sleep as tolerated  Activity restriction recommended: Use of right hand with brace as tolerated, Medco 14 continued at today's visit  Encouraged twice daily ROM of wrist, hand and fingers, may remove brace with rest and sleep  Follow up here 3-4 weeks , sooner for changes or concerns.    C9 for OT, may begin weaning out of brace as tolerated.   We discussed plan to transition into more activity at the next visit depending on evaluation and review of therapy notes  Patient in agreement with plan of care.  This note was generated using Dragon software.  It may contain errors in wording, punctuation or spelling.         Other sprain of right thumb, initial encounter S63.681A    Relevant Medications    diclofenac sodium (Voltaren) 1 % gel gel    Other Relevant Orders    Follow Up In Orthopaedic Surgery    Referral to Occupational Therapy

## 2024-01-22 ENCOUNTER — OFFICE VISIT (OUTPATIENT)
Dept: OBSTETRICS AND GYNECOLOGY | Facility: CLINIC | Age: 24
End: 2024-01-22
Payer: COMMERCIAL

## 2024-01-22 VITALS
SYSTOLIC BLOOD PRESSURE: 112 MMHG | BODY MASS INDEX: 20.09 KG/M2 | WEIGHT: 128 LBS | DIASTOLIC BLOOD PRESSURE: 64 MMHG | HEIGHT: 67 IN

## 2024-01-22 DIAGNOSIS — T14.8XXA SKIN WOUND FROM SURGICAL INCISION: Primary | ICD-10-CM

## 2024-01-22 PROCEDURE — 3074F SYST BP LT 130 MM HG: CPT | Performed by: OBSTETRICS & GYNECOLOGY

## 2024-01-22 PROCEDURE — 3078F DIAST BP <80 MM HG: CPT | Performed by: OBSTETRICS & GYNECOLOGY

## 2024-01-22 PROCEDURE — 1036F TOBACCO NON-USER: CPT | Performed by: OBSTETRICS & GYNECOLOGY

## 2024-01-22 PROCEDURE — 99213 OFFICE O/P EST LOW 20 MIN: CPT | Performed by: OBSTETRICS & GYNECOLOGY

## 2024-01-22 NOTE — PROGRESS NOTES
Jessi Alvarado is a 23 y.o. year old female patient.  PCP = Lambert Chaudhari MD    Chief Complaint   Patient presents with    Issue with nexplanon site     Patient is here due to bleeding from Nexplanon site. Patient states the Nexplanon was due to come out and removal was attempted on 24, patient has had bleeding since. Nexplanon is in her right arm.       HPI   Presents presents stating that she has noticed some bleeding from the incision site in the attempt to remove the current Nexplanon from her right inner arm on .  The Nexplanon cannot be removed at that time.  Patient is right-handed.  Denies any fevers or chills or noticing any redness or drainage from the incision site.    OB History          0    Para   0    Term   0       0    AB   0    Living   0         SAB   0    IAB   0    Ectopic   0    Multiple   0    Live Births   0                 Past Medical History:   Diagnosis Date    Attention-deficit hyperactivity disorder, predominantly hyperactive type     Attention deficit hyperactivity disorder (ADHD), predominantly hyperactive type    Concussion with no loss of consciousness 2018    Contusion of left hand 2023    Groin abscess 2023    Hand sprain, left, sequela 2023    Hematuria 2023    Left wrist pain 2023    Left wrist sprain, sequela 2023    Lump of skin 2023    Numbness of fingers 2023    Other conditions influencing health status 2020    Menarche    Personal history of other diseases of the female genital tract     History of ovarian cyst    Personal history of other diseases of the respiratory system     History of asthma    Post-op pain 2023    Right knee pain 2023    Stiffness of right knee, not elsewhere classified 2023       Past Surgical History:   Procedure Laterality Date    KNEE SURGERY      WISDOM TOOTH EXTRACTION         Review of Systems:   Constitutional: No fever or  chills  Respiratory: No shortness of breath, or cough  Cardiovascular: No chest pain or syncope  Breasts: No breast pain, no masses, no nipple discharge  Gastrointestinal: No nausea, vomiting, or diarrhea, no abdominal pain  Genitourinary: No dysuria or frequency  Gynecology: Negative except as noted in history of present illness  All other: All other systems reviewed and negative for complaint    Medication Documentation Review Audit       Reviewed by Elizabeth Calles MA (Medical Assistant) on 01/19/24 at 0828      Medication Order Taking? Sig Documenting Provider Last Dose Status   albuterol 2.5 mg /3 mL (0.083 %) nebulizer solution [id number] No Inhale 3 mL (2.5 mg) every 6 hours if needed for wheezing. Historical Provider, MD Taking Active   albuterol 90 mcg/actuation inhaler [id number] No Inhale 2 puffs every 4 hours if needed for wheezing. Emi Cox MD Taking Active   ascorbic acid, vitamin C, 500 mg capsule [id number] No Take by mouth. Historical Provider, MD Taking Active   benzonatate (Tessalon) 100 mg capsule [id number]  Take 1 capsule (100 mg) by mouth every 8 hours for 7 days. Do not crush or chew. Melchor Eubanks, DO  Active   benzoyl peroxide 5 % lotion [id number] No 1 Application Historical MD Carla Not Taking Active   cholecalciferol (Vitamin D-3) 125 MCG (5000 UT) capsule [id number] No Take by mouth. Historical Provider, MD Not Taking Active   elderberry fruit and flower 460-115 mg capsule [id number] No Take by mouth. Historical Provider, MD Taking Active   EPINEPHrine 0.3 mg/0.3 mL injection syringe [id number] No 0.3 MILLIGRAM(S) INTRAMUSCULARLY ONCE A DAY, AS NEEDED FOR SEVERE ALLERGIC REACTION Lambert Chaudhari MD Taking Active   Patient not taking:  Discontinued 01/18/24 0910   famotidine (Pepcid) 20 mg tablet [id number] No Take 1 tablet (20 mg) by mouth 2 times a day. Emi Cox MD Taking Active   FreeStyle Xi 3 Sensor device [id number] No  Historical MD Carla Taking Active  "  ibuprofen 600 mg tablet [id number] No Take 1 tablet (600 mg) by mouth every 8 hours if needed for mild pain (1 - 3) or fever (temp greater than 38.0 C). LAY Gomez-CNP Taking Active   levonorgestreL-ethinyl estrad (Aviane, Alesse, Lessina) 0.1 mg- 20 mcg tablet [id number]  Chew 1 tablet once daily. Yoly Logan, APRN-CNM, APRN-CNP, DNP  Active   neomycin-polymyxin-HC (Cortisporin) otic solution [id number] No  Historical Provider, MD Not Taking Active   norelgestromin-ethin.estradioL (Xulane) 150-35 mcg/24 hr [id number] No Place on the skin. Historical Provider, MD Not Taking Active   ondansetron ODT (Zofran-ODT) 4 mg disintegrating tablet [id number] No Take 1 tablet (4 mg) by mouth every 8 hours if needed for nausea or vomiting for up to 20 doses. Rodrigue Washington MD Taking Active   pedi multivit no.17 w-fluoride (Poly-Vi-Cony) 0.5 mg tablet,chewable [id number] No Chew 1 tablet once daily. Historical Provider, MD Taking Active   zinc sulfate (Zincate) 220 (50 Zn) MG capsule [id number] No Take by mouth once daily. Historical Provider, MD Taking Active                     /64   Ht 1.702 m (5' 7\")   Wt 58.1 kg (128 lb)   LMP 01/11/2024 (Within Weeks)   BMI 20.05 kg/m²     PHYSICAL EXAMINATION:  General: No acute distress  Eye: Intraocular movements are intact  HEENT: Normocephalic  Respiratory: Respirations are nonlabored  Gastrointestinal: Nondistended   Musculoskeletal: Normal range of motion.  Inspection of the right inner arm reveals an approximately 5-8 mm skin incision without evidence of erythema or drainage.  No evidence of bleeding from the incision site.  No evidence of infection at the incision site.  The Nexplanon is relatively deep under the skin and proximal to the wound incision.  Neurologic: Alert and oriented x3  Psychiatric: Cooperative, appropriate mood and affect.        No orders of the defined types were placed in this encounter.       Problem List Items Addressed This Visit    None   "     Provider Impression:  1.  Incision check  Reassured there is no evidence of infection or drainage from the incision site.  The wound is not actively bleeding.  The wound incision will need to heal by secondary intention.  Patient encouraged to use hydroperoxide and Neosporin to the incision site.  Follow-up in several weeks for reinspection.

## 2024-01-30 ENCOUNTER — OFFICE VISIT (OUTPATIENT)
Dept: PRIMARY CARE | Facility: CLINIC | Age: 24
End: 2024-01-30
Payer: COMMERCIAL

## 2024-01-30 VITALS
BODY MASS INDEX: 19.93 KG/M2 | WEIGHT: 127 LBS | HEIGHT: 67 IN | HEART RATE: 84 BPM | SYSTOLIC BLOOD PRESSURE: 102 MMHG | DIASTOLIC BLOOD PRESSURE: 66 MMHG

## 2024-01-30 DIAGNOSIS — J45.21 MILD INTERMITTENT ASTHMA WITH EXACERBATION (HHS-HCC): ICD-10-CM

## 2024-01-30 DIAGNOSIS — K21.9 GASTROESOPHAGEAL REFLUX DISEASE, UNSPECIFIED WHETHER ESOPHAGITIS PRESENT: ICD-10-CM

## 2024-01-30 DIAGNOSIS — J45.20 MILD INTERMITTENT ASTHMA, UNSPECIFIED WHETHER COMPLICATED (HHS-HCC): Primary | ICD-10-CM

## 2024-01-30 DIAGNOSIS — R05.2 SUBACUTE COUGH: ICD-10-CM

## 2024-01-30 PROBLEM — M22.2X9 PATELLOFEMORAL SYNDROME: Status: RESOLVED | Noted: 2023-04-18 | Resolved: 2024-01-30

## 2024-01-30 PROBLEM — S63.681A OTHER SPRAIN OF RIGHT THUMB, INITIAL ENCOUNTER: Status: RESOLVED | Noted: 2024-01-05 | Resolved: 2024-01-30

## 2024-01-30 PROBLEM — M25.50 JOINT PAIN: Status: RESOLVED | Noted: 2023-04-18 | Resolved: 2024-01-30

## 2024-01-30 PROBLEM — K52.9 CHRONIC DIARRHEA: Status: RESOLVED | Noted: 2023-09-26 | Resolved: 2024-01-30

## 2024-01-30 PROBLEM — M79.603 ARM PAIN: Status: RESOLVED | Noted: 2023-09-26 | Resolved: 2024-01-30

## 2024-01-30 PROBLEM — M62.89 HIGH-TONE PELVIC FLOOR DYSFUNCTION: Status: RESOLVED | Noted: 2023-04-18 | Resolved: 2024-01-30

## 2024-01-30 PROBLEM — D64.9 LOW HEMOGLOBIN: Status: RESOLVED | Noted: 2023-04-18 | Resolved: 2024-01-30

## 2024-01-30 PROBLEM — N92.1 MENORRHAGIA WITH IRREGULAR CYCLE: Status: RESOLVED | Noted: 2023-04-18 | Resolved: 2024-01-30

## 2024-01-30 PROBLEM — R10.2 PAIN, PELVIC, FEMALE: Status: RESOLVED | Noted: 2023-04-18 | Resolved: 2024-01-30

## 2024-01-30 PROBLEM — R10.13 ABDOMINAL PAIN, EPIGASTRIC: Status: RESOLVED | Noted: 2023-09-26 | Resolved: 2024-01-30

## 2024-01-30 PROBLEM — R53.82 CHRONIC FATIGUE: Status: RESOLVED | Noted: 2023-09-26 | Resolved: 2024-01-30

## 2024-01-30 PROBLEM — R10.33 ABDOMINAL PAIN, PERIUMBILICAL: Status: RESOLVED | Noted: 2023-09-26 | Resolved: 2024-01-30

## 2024-01-30 PROBLEM — I45.10 INCOMPLETE RBBB: Status: RESOLVED | Noted: 2023-09-26 | Resolved: 2024-01-30

## 2024-01-30 PROBLEM — S62.111A CLOSED CHIP FRACTURE OF TRIQUETRAL BONE OF RIGHT WRIST: Status: RESOLVED | Noted: 2023-09-26 | Resolved: 2024-01-30

## 2024-01-30 PROBLEM — R10.9 FLANK PAIN: Status: RESOLVED | Noted: 2024-01-03 | Resolved: 2024-01-30

## 2024-01-30 PROBLEM — R93.5 ABNORMAL CT SCAN, PELVIS: Status: RESOLVED | Noted: 2023-09-26 | Resolved: 2024-01-30

## 2024-01-30 PROBLEM — M25.551 RIGHT HIP PAIN: Status: RESOLVED | Noted: 2018-12-06 | Resolved: 2024-01-30

## 2024-01-30 PROBLEM — R10.12 ABDOMINAL PAIN, LUQ: Status: RESOLVED | Noted: 2023-09-26 | Resolved: 2024-01-30

## 2024-01-30 PROBLEM — F07.81 POST CONCUSSION SYNDROME: Status: RESOLVED | Noted: 2017-12-13 | Resolved: 2024-01-30

## 2024-01-30 PROBLEM — S00.83XA FACIAL CONTUSION: Status: RESOLVED | Noted: 2023-04-18 | Resolved: 2024-01-30

## 2024-01-30 PROBLEM — R73.9 HYPERGLYCEMIA: Status: RESOLVED | Noted: 2023-09-30 | Resolved: 2024-01-30

## 2024-01-30 PROBLEM — M25.562 LEFT KNEE PAIN: Status: RESOLVED | Noted: 2017-08-15 | Resolved: 2024-01-30

## 2024-01-30 PROBLEM — E87.6 HYPOKALEMIA: Status: RESOLVED | Noted: 2023-09-26 | Resolved: 2024-01-30

## 2024-01-30 PROBLEM — M25.522 LEFT ELBOW PAIN: Status: RESOLVED | Noted: 2023-09-26 | Resolved: 2024-01-30

## 2024-01-30 PROBLEM — R49.8 OTHER VOICE AND RESONANCE DISORDERS: Status: RESOLVED | Noted: 2023-04-18 | Resolved: 2024-01-30

## 2024-01-30 PROBLEM — E11.649: Status: RESOLVED | Noted: 2023-09-30 | Resolved: 2024-01-30

## 2024-01-30 PROBLEM — R41.89 COGNITIVE CHANGES: Status: RESOLVED | Noted: 2018-05-31 | Resolved: 2024-01-30

## 2024-01-30 PROBLEM — M54.41 ACUTE RIGHT-SIDED LOW BACK PAIN WITH RIGHT-SIDED SCIATICA: Status: RESOLVED | Noted: 2018-12-06 | Resolved: 2024-01-30

## 2024-01-30 PROBLEM — R30.0 DYSURIA: Status: RESOLVED | Noted: 2023-09-26 | Resolved: 2024-01-30

## 2024-01-30 PROBLEM — S83.289A ACUTE LATERAL MENISCAL TEAR: Status: RESOLVED | Noted: 2023-09-26 | Resolved: 2024-01-30

## 2024-01-30 PROBLEM — J38.3 VOCAL CORD DYSFUNCTION: Status: RESOLVED | Noted: 2020-07-13 | Resolved: 2024-01-30

## 2024-01-30 PROBLEM — S60.011A CONTUSION OF RIGHT THUMB WITHOUT DAMAGE TO NAIL: Status: RESOLVED | Noted: 2024-01-05 | Resolved: 2024-01-30

## 2024-01-30 PROBLEM — K62.5 RECTAL BLEEDING: Status: RESOLVED | Noted: 2023-11-20 | Resolved: 2024-01-30

## 2024-01-30 PROBLEM — J45.40 MODERATE PERSISTENT ASTHMA WITHOUT COMPLICATION (HHS-HCC): Status: RESOLVED | Noted: 2020-01-13 | Resolved: 2024-01-30

## 2024-01-30 PROBLEM — N20.1 URETERAL CALCULUS, RIGHT: Status: RESOLVED | Noted: 2023-11-20 | Resolved: 2024-01-30

## 2024-01-30 PROBLEM — M23.91 LOCKING OF RIGHT KNEE: Status: RESOLVED | Noted: 2018-01-04 | Resolved: 2024-01-30

## 2024-01-30 PROCEDURE — 99214 OFFICE O/P EST MOD 30 MIN: CPT | Performed by: INTERNAL MEDICINE

## 2024-01-30 PROCEDURE — 1036F TOBACCO NON-USER: CPT | Performed by: INTERNAL MEDICINE

## 2024-01-30 RX ORDER — AZITHROMYCIN 250 MG/1
TABLET, FILM COATED ORAL
Qty: 6 TABLET | Refills: 0 | Status: SHIPPED | OUTPATIENT
Start: 2024-01-30 | End: 2024-02-04

## 2024-01-30 RX ORDER — OMEPRAZOLE 20 MG/1
20 CAPSULE, DELAYED RELEASE ORAL
Qty: 30 CAPSULE | Refills: 2 | Status: SHIPPED | OUTPATIENT
Start: 2024-01-30 | End: 2024-04-29

## 2024-01-30 ASSESSMENT — ENCOUNTER SYMPTOMS
AGITATION: 0
ABDOMINAL PAIN: 0
NAUSEA: 0
GASTROINTESTINAL NEGATIVE: 1
JOINT SWELLING: 0
NEUROLOGICAL NEGATIVE: 1
HEADACHES: 0
DIARRHEA: 0
BACK PAIN: 0
COUGH: 1
NUMBNESS: 0
LIGHT-HEADEDNESS: 0
EYE DISCHARGE: 0
PALPITATIONS: 0
ADENOPATHY: 0
CARDIOVASCULAR NEGATIVE: 1
ALLERGIC/IMMUNOLOGIC NEGATIVE: 1
WHEEZING: 0
HEMATOLOGIC/LYMPHATIC NEGATIVE: 1
ENDOCRINE NEGATIVE: 1
PSYCHIATRIC NEGATIVE: 1
ABDOMINAL DISTENTION: 0
CONFUSION: 0
CHILLS: 0
CONSTITUTIONAL NEGATIVE: 1
SHORTNESS OF BREATH: 1
DYSURIA: 0
MUSCULOSKELETAL NEGATIVE: 1
NECK STIFFNESS: 0
EYES NEGATIVE: 1
FEVER: 0
CONSTIPATION: 0
VOMITING: 0

## 2024-01-30 NOTE — PROGRESS NOTES
Subjective   Patient ID: Jessi Alvarado is a 23 y.o. female who presents for Follow-up (6 mo fu lab pt co inhaler not working for her).  Here for fu with labs    Has had cough x 3 weeks went to ER , got tessalon and tylenol, the cough is better but not gone, uses ventolin but still has cough    Co frequent heart burns        Review of Systems   Constitutional: Negative.  Negative for chills and fever.   HENT: Negative.  Negative for congestion.    Eyes: Negative.  Negative for discharge.   Respiratory:  Positive for cough and shortness of breath (on exertion). Negative for wheezing.    Cardiovascular: Negative.  Negative for chest pain, palpitations and leg swelling.   Gastrointestinal: Negative.  Negative for abdominal distention, abdominal pain, constipation, diarrhea, nausea and vomiting.   Endocrine: Negative.    Genitourinary: Negative.  Negative for dysuria and urgency.   Musculoskeletal: Negative.  Negative for back pain, joint swelling and neck stiffness.   Skin: Negative.  Negative for rash.   Allergic/Immunologic: Negative.  Negative for immunocompromised state.   Neurological: Negative.  Negative for light-headedness, numbness and headaches.   Hematological: Negative.  Negative for adenopathy.   Psychiatric/Behavioral: Negative.  Negative for agitation, behavioral problems and confusion.    All other systems reviewed and are negative.      Objective   Physical Exam  Vitals reviewed.   Constitutional:       General: She is not in acute distress.     Appearance: Normal appearance.   HENT:      Head: Normocephalic and atraumatic.      Nose: Nose normal.   Eyes:      Conjunctiva/sclera: Conjunctivae normal.      Pupils: Pupils are equal, round, and reactive to light.   Neck:      Vascular: No carotid bruit.   Cardiovascular:      Rate and Rhythm: Normal rate and regular rhythm.      Pulses: Normal pulses.      Heart sounds:      No gallop.   Pulmonary:      Effort: Pulmonary effort is normal. No  "respiratory distress.      Breath sounds: Normal breath sounds. No wheezing.   Abdominal:      General: Bowel sounds are normal.      Palpations: Abdomen is soft.      Tenderness: There is no abdominal tenderness.   Musculoskeletal:         General: Normal range of motion.      Cervical back: Normal range of motion. No rigidity.   Lymphadenopathy:      Cervical: No cervical adenopathy.   Skin:     General: Skin is warm.      Findings: No rash.   Neurological:      General: No focal deficit present.      Mental Status: She is alert and oriented to person, place, and time.   Psychiatric:         Mood and Affect: Mood normal.         Behavior: Behavior normal.       /66 (BP Location: Right arm, Patient Position: Sitting)   Pulse 84   Ht 1.702 m (5' 7\")   Wt 57.6 kg (127 lb)   LMP 01/11/2024 (Within Weeks)   BMI 19.89 kg/m²    Hemoglobin A1C   Date/Time Value Ref Range Status   09/08/2023 09:32 AM 5.4 % Final     Comment:          Diagnosis of Diabetes-Adults   Non-Diabetic: < or = 5.6%   Increased risk for developing diabetes: 5.7-6.4%   Diagnostic of diabetes: > or = 6.5%  .       Monitoring of Diabetes                Age (y)     Therapeutic Goal (%)   Adults:          >18           <7.0   Pediatrics:    13-18           <7.5                   7-12           <8.0                   0- 6            7.5-8.5   American Diabetes Association. Diabetes Care 33(S1), Jan 2010.     Assessment/Plan   Problem List Items Addressed This Visit    None  Visit Diagnoses       Mild intermittent asthma, unspecified whether complicated    -  Primary    Relevant Orders    Complete Pulmonary Function Test Pre/Post Bronchodialator (Spirometry Pre/Post/DLCO/Lung Volumes)    Gastroesophageal reflux disease, unspecified whether esophagitis present        Relevant Medications    omeprazole (PriLOSEC) 20 mg DR capsule    Subacute cough        Relevant Medications    azithromycin (Zithromax) 250 mg tablet    Mild intermittent asthma " with exacerbation        Relevant Medications    azithromycin (Zithromax) 250 mg tablet             CXR negative    Labs reviewed with pt      MDM    1) COMPLEXITY: 1 OR MORE CHRONIC CONDITION WITH EXACERBATION, OR PROGRESSION OR SIDE EFFECT OF TREATMENT ADDRESSED  2)DATA: TESTS INTERPRETED AND OR ORDERED, TOOK INDEPENDENT HISTORY OR RECORDS REVIEWED  3)RISK: MODERATE RISK DUE TO NATURE OF MEDICAL CONDITIONS/COMORBIDITY OR MEDICATIONS ORDERED OR SURGICAL OR PROCEDURE REFERRAL, .

## 2024-01-31 ENCOUNTER — OFFICE VISIT (OUTPATIENT)
Dept: URGENT CARE | Facility: CLINIC | Age: 24
End: 2024-01-31
Payer: COMMERCIAL

## 2024-01-31 VITALS
OXYGEN SATURATION: 100 % | HEART RATE: 76 BPM | BODY MASS INDEX: 19.93 KG/M2 | HEIGHT: 67 IN | SYSTOLIC BLOOD PRESSURE: 122 MMHG | TEMPERATURE: 97.3 F | WEIGHT: 127 LBS | DIASTOLIC BLOOD PRESSURE: 76 MMHG | RESPIRATION RATE: 16 BRPM

## 2024-01-31 DIAGNOSIS — J06.9 ACUTE UPPER RESPIRATORY INFECTION: Primary | ICD-10-CM

## 2024-01-31 LAB
POC TRIPLEX FLU A-AG: NORMAL
POC TRIPLEX FLU B-AG: NORMAL
POC TRIPLEX SARSCOV-2 AG: NORMAL

## 2024-01-31 PROCEDURE — 87428 SARSCOV & INF VIR A&B AG IA: CPT | Performed by: NURSE PRACTITIONER

## 2024-01-31 PROCEDURE — 99213 OFFICE O/P EST LOW 20 MIN: CPT | Mod: 25 | Performed by: NURSE PRACTITIONER

## 2024-01-31 NOTE — PROGRESS NOTES
[facility] FELICE NOTE:      Name: Jessi Alvarado, 23 y.o.    CSN:[id number]   MRN:[id number]    PCP: Lambert Chaudhari MD    ALL:    Allergies   Allergen Reactions    Cinnamon Shortness of breath     cinnamon    Pomegranate Fruit Extract Shortness of breath     pomagranate    Sulfa (Sulfonamide Antibiotics) Other    Sulfamethoxazole-Trimethoprim Hives and Other       History:    Chief Complaint: URI (Head hurts, ear pain, chills, diarrhea X 1 day)    Encounter Date: 1/31/2024  11:42    HPI: The history was obtained from the patient. Jessi is a 23 y.o. female, who presents with a chief complaint of URI (Head hurts, ear pain, chills, diarrhea X 1 day). States that she was given azithromycin by PCP for cough that she had but has not taken it yet. Also c/o abdominal pain, nasal congestion. Denies any body aches, fever, chest pain, shortness of breath. She states that she has had sick contacts at work.     PMHx:    Past Medical History:   Diagnosis Date    Attention-deficit hyperactivity disorder, predominantly hyperactive type     Attention deficit hyperactivity disorder (ADHD), predominantly hyperactive type    Concussion with no loss of consciousness 03/02/2018    Contusion of left hand 04/18/2023    Groin abscess 04/18/2023    Hand sprain, left, sequela 04/18/2023    Hematuria 04/18/2023    Left wrist pain 04/18/2023    Left wrist sprain, sequela 04/18/2023    Lump of skin 04/18/2023    Numbness of fingers 04/18/2023    Other conditions influencing health status 09/23/2020    Menarche    Personal history of other diseases of the female genital tract     History of ovarian cyst    Personal history of other diseases of the respiratory system     History of asthma    Post-op pain 04/18/2023    Right knee pain 04/18/2023    Stiffness of right knee, not elsewhere classified 04/18/2023              Current Outpatient Medications   Medication Sig Dispense Refill    albuterol 2.5 mg /3 mL (0.083  %) nebulizer solution Inhale 3 mL (2.5 mg) every 6 hours if needed for wheezing.      albuterol 90 mcg/actuation inhaler Inhale 2 puffs every 4 hours if needed for wheezing. 18 g 11    ascorbic acid, vitamin C, 500 mg capsule Take by mouth.      benzoyl peroxide 5 % lotion 1 Application      cholecalciferol (Vitamin D-3) 125 MCG (5000 UT) capsule Take by mouth.      diclofenac sodium (Voltaren) 1 % gel gel Apply 2 grams 4x/daily to affected area 100 g 1    elderberry fruit and flower 460-115 mg capsule Take by mouth.      EPINEPHrine 0.3 mg/0.3 mL injection syringe 0.3 MILLIGRAM(S) INTRAMUSCULARLY ONCE A DAY, AS NEEDED FOR SEVERE ALLERGIC REACTION 1 each 0    famotidine (Pepcid) 20 mg tablet Take 1 tablet (20 mg) by mouth 2 times a day. 60 tablet 5    FreeStyle Xi 3 Sensor device       ibuprofen 600 mg tablet Take 1 tablet (600 mg) by mouth every 8 hours if needed for mild pain (1 - 3) or fever (temp greater than 38.0 C). 30 tablet 0    levonorgestreL-ethinyl estrad (Aviane, Alesse, Lessina) 0.1 mg- 20 mcg tablet Chew 1 tablet once daily. 28 tablet 11    neomycin-polymyxin-HC (Cortisporin) otic solution       norelgestromin-ethin.estradioL (Xulane) 150-35 mcg/24 hr Place on the skin.      omeprazole (PriLOSEC) 20 mg DR capsule Take 1 capsule (20 mg) by mouth once daily in the morning. Take before meals. Do not crush or chew. 30 capsule 2    ondansetron ODT (Zofran-ODT) 4 mg disintegrating tablet Take 1 tablet (4 mg) by mouth every 8 hours if needed for nausea or vomiting for up to 20 doses. 20 tablet 0    pedi multivit no.17 w-fluoride (Poly-Vi-Cony) 0.5 mg tablet,chewable Chew 1 tablet once daily.      zinc sulfate (Zincate) 220 (50 Zn) MG capsule Take by mouth once daily.      azithromycin (Zithromax) 250 mg tablet Take 2 tablets (500 mg) by mouth once daily for 1 day, THEN 1 tablet (250 mg) once daily for 4 days. Take 2 tabs (500 mg) by mouth today, than 1 daily for 4 days.. (Patient not taking: Reported on  1/31/2024) 6 tablet 0     No current facility-administered medications for this visit.         PMSx:    Past Surgical History:   Procedure Laterality Date    KNEE SURGERY      WISDOM TOOTH EXTRACTION         Fam Hx:   Family History   Problem Relation Name Age of Onset    No Known Problems Mother      ADD / ADHD Father      Cancer Maternal Grandmother      Hypertension Maternal Grandfather      IRMA disease Paternal Grandmother      Irritable bowel syndrome Paternal Grandmother      Hypertension Paternal Grandfather         SOC. Hx:     Social History     Socioeconomic History    Marital status: Single     Spouse name: Not on file    Number of children: Not on file    Years of education: Not on file    Highest education level: Not on file   Occupational History    Not on file   Tobacco Use    Smoking status: Never     Passive exposure: Never    Smokeless tobacco: Never   Vaping Use    Vaping Use: Every day    Substances: Nicotine    Devices: Refillable tank   Substance and Sexual Activity    Alcohol use: Not Currently    Drug use: Never    Sexual activity: Yes     Birth control/protection: Implant   Other Topics Concern    Not on file   Social History Narrative    Not on file     Social Determinants of Health     Financial Resource Strain: Not on file   Food Insecurity: Not on file   Transportation Needs: Not on file   Physical Activity: Not on file   Stress: Not on file   Social Connections: Not on file   Intimate Partner Violence: Not on file   Housing Stability: Not on file         Vitals:    01/31/24 1139   BP: 122/76   Pulse: 76   Resp: 16   Temp: 36.3 °C (97.3 °F)   SpO2: 100%     57.6 kg (127 lb)          Physical Exam  Constitutional:       General: She is not in acute distress.     Appearance: Normal appearance.   HENT:      Head: Normocephalic and atraumatic.      Right Ear: Tympanic membrane, ear canal and external ear normal.      Left Ear: Tympanic membrane, ear canal and external ear normal.      Nose:  Nose normal.      Mouth/Throat:      Mouth: Mucous membranes are moist.      Pharynx: No oropharyngeal exudate or posterior oropharyngeal erythema.   Eyes:      Extraocular Movements: Extraocular movements intact.      Conjunctiva/sclera: Conjunctivae normal.   Cardiovascular:      Rate and Rhythm: Normal rate and regular rhythm.      Heart sounds: Normal heart sounds.   Pulmonary:      Effort: Pulmonary effort is normal. No respiratory distress.      Breath sounds: Normal breath sounds.   Musculoskeletal:         General: Normal range of motion.      Cervical back: Normal range of motion and neck supple.   Lymphadenopathy:      Cervical: No cervical adenopathy.   Skin:     General: Skin is warm.   Neurological:      General: No focal deficit present.      Mental Status: She is alert and oriented to person, place, and time.   Psychiatric:         Mood and Affect: Mood normal.         Behavior: Behavior normal.           LABORATORY @ RADIOLOGICAL IMAGING (if done):     Recent Results (from the past 1 hour(s))   POCT BD Veritor Triplex Ag    Collection Time: 01/31/24 12:36 PM   Result Value Ref Range    POC Triplex SARS-CoV-2 Ag  Presumptive negative for Triplex SARS-CoV-2 (no antigen detected)     Presumptive negative for Triplex SARS-CoV-2 (no antigen detected)    POC Triplex Flu A-Ag  Presumptive negative for Triplex FLU A (no antigen detected)     Presumptive negative for Triplex FLU A (no antigen detected)    POC Triplex Flu B-Ag  Presumptive negative for Triplex FLU B (no antigen detected)     Presumptive negative for Triplex FLU B (no antigen detected)       UC COURSE/MEDICAL DECISION MAKING:    Jessi is a 23 y.o., who presents with a working diagnosis of   1. Acute upper respiratory infection     with a differential to include: Covid, Flu, RSV, streptococcal pharyngitis    Streptococcal pharyngitis less likely as there is no exudates posterior oropharyngeal and no lymphadenopathy.     Educated patient that  illness is most likely viral in origin. Patient to continue taking OTC medication for symptom relief. Warned patient to return to clinic or go to ER if worsening symptoms, or development of chest pain/shortness of breath. Patient agreed to treatment plan and all concerns were addressed.       Geronimo MCINTOSH  [facility]      Supervised by:   ROMY Torres    Advanced Practice Provider  [facility]

## 2024-01-31 NOTE — LETTER
January 31, 2024     Patient: Jessi Alvarado   YOB: 2000   Date of Visit: 1/31/2024       To Whom It May Concern:    Jessi Alvarado was seen in my clinic on 1/31/2024 at 11:35 am. Please excuse Jessi for her absence from work on this day to make the appointment.    If you have any questions or concerns, please don't hesitate to call.         Sincerely,         Frantz Onofre, LAY-CNP        CC: No Recipients

## 2024-02-02 ENCOUNTER — HOSPITAL ENCOUNTER (OUTPATIENT)
Dept: RESPIRATORY THERAPY | Facility: HOSPITAL | Age: 24
Discharge: HOME | End: 2024-02-02
Payer: COMMERCIAL

## 2024-02-02 DIAGNOSIS — J45.20 MILD INTERMITTENT ASTHMA, UNSPECIFIED WHETHER COMPLICATED (HHS-HCC): ICD-10-CM

## 2024-02-02 PROCEDURE — 94726 PLETHYSMOGRAPHY LUNG VOLUMES: CPT

## 2024-02-06 ENCOUNTER — OUTSIDE PROCEDURE (OUTPATIENT)
Dept: PRIMARY CARE | Facility: CLINIC | Age: 24
End: 2024-02-06
Payer: COMMERCIAL

## 2024-02-06 DIAGNOSIS — J45.20 MILD INTERMITTENT ASTHMA, UNSPECIFIED WHETHER COMPLICATED (HHS-HCC): Primary | ICD-10-CM

## 2024-02-06 PROCEDURE — 94060 EVALUATION OF WHEEZING: CPT | Performed by: INTERNAL MEDICINE

## 2024-02-12 ENCOUNTER — APPOINTMENT (OUTPATIENT)
Dept: OBSTETRICS AND GYNECOLOGY | Facility: CLINIC | Age: 24
End: 2024-02-12
Payer: COMMERCIAL

## 2024-02-14 ENCOUNTER — TELEMEDICINE (OUTPATIENT)
Dept: ORTHOPEDIC SURGERY | Facility: CLINIC | Age: 24
End: 2024-02-14
Payer: COMMERCIAL

## 2024-02-14 DIAGNOSIS — S63.681A OTHER SPRAIN OF RIGHT THUMB, INITIAL ENCOUNTER: ICD-10-CM

## 2024-02-14 DIAGNOSIS — S60.011A CONTUSION OF RIGHT THUMB WITHOUT DAMAGE TO NAIL, INITIAL ENCOUNTER: Primary | ICD-10-CM

## 2024-02-14 PROCEDURE — 99213 OFFICE O/P EST LOW 20 MIN: CPT | Performed by: NURSE PRACTITIONER

## 2024-02-14 ASSESSMENT — ENCOUNTER SYMPTOMS
ENDOCRINE NEGATIVE: 1
PSYCHIATRIC NEGATIVE: 1
RESPIRATORY NEGATIVE: 1
ARTHRALGIAS: 1
NEUROLOGICAL NEGATIVE: 1
HEMATOLOGIC/LYMPHATIC NEGATIVE: 1
CARDIOVASCULAR NEGATIVE: 1
CONSTITUTIONAL NEGATIVE: 1

## 2024-02-14 NOTE — PROGRESS NOTES
/Subjective    Patient ID: Jessi Alvarado is a 23 y.o. female.    Chief Complaint: Follow-up of the Right Thumb     [facility] inj DOI 12/19/2023  Today's visit was completed via virtual visit with audio and video  HPI  Jessi is a pleasant 23-year-old female presenting today for virtual FUV R thumb injury at work   12/19/2023 metal bar struck outer R thumb  Sx to MCP joint and dorsal carpal rows  Working within restrictions from previous [employer], restrictions and as of today  Patient states she has not started OT, awaiting [facility] decision  Motrin 600mg prn helps  Ice/heat helps  RH dominant    Review of Systems   Constitutional: Negative.    HENT: Negative.     Respiratory: Negative.     Cardiovascular: Negative.    Endocrine: Negative.    Musculoskeletal:  Positive for arthralgias.   Skin: Negative.    Neurological: Negative.    Hematological: Negative.    Psychiatric/Behavioral: Negative.         Objective   Right Hand Exam     Tenderness   Right hand tenderness location: 1st metacarpal region into thenar region.    Range of Motion   The patient has normal right wrist ROM.     Tests   Finkelstein's test: negative    Other   Erythema: absent  Sensation: normal  Pulse: present    Comments:  Skin is pink, warm, dry and intact.  Patient with good range of motion of the wrist in all directions, mild aggravation with  ulnar deviation.  Negative Finkelstein's.  Distal motor and sensory intact, cap refill at 2 seconds with directed assessment observed.            Image Results:  === 12/19/23 ===    XR HAND 3+ VIEWS RIGHT    - Impression -  No acute osseous abnormalities.  Signed by Yanick Eubanks MD         Assessment/Plan   Encounter Diagnoses:  Problem List Items Addressed This Visit             ICD-10-CM    Other sprain of right thumb, initial encounter S63.681A    Relevant Orders    Follow Up In Orthopaedic Surgery    Contusion of right thumb without damage to nail - Primary S60.011A     We reviewed use of brace with  repetitive activities and work.  Patient may continue to gradually increase activity out of the brace with nonweightbearing status.  Encouraged range of motion exercises of wrist and hand 1-2 times daily.  We reviewed use of OTC ibuprofen, Tylenol, topical remedies and Epsom salt soaks for symptom control.  Staff to resend and/or follow-up with [facility]  regarding approval for OT.  Plan will be to follow-up here in approximately 1 month, sooner for changes or concerns.  I did advise the patient if she feels she is ready to start advancing she may move the appointment up.  Patient in agreement with plan of care.  [employer] updated with same restrictions  This note was generated using Dragon software.  It may contain errors in wording, punctuation or spelling.           Relevant Orders    Follow Up In Orthopaedic Surgery

## 2024-02-15 DIAGNOSIS — J45.20 MILD INTERMITTENT ASTHMA, UNSPECIFIED WHETHER COMPLICATED (HHS-HCC): ICD-10-CM

## 2024-02-15 RX ORDER — ALBUTEROL SULFATE 90 UG/1
2 AEROSOL, METERED RESPIRATORY (INHALATION) EVERY 4 HOURS PRN
Qty: 18 G | Refills: 11 | Status: SHIPPED | OUTPATIENT
Start: 2024-02-15

## 2024-02-27 ENCOUNTER — OFFICE VISIT (OUTPATIENT)
Dept: PRIMARY CARE | Facility: CLINIC | Age: 24
End: 2024-02-27
Payer: COMMERCIAL

## 2024-02-27 VITALS
HEART RATE: 88 BPM | WEIGHT: 129 LBS | DIASTOLIC BLOOD PRESSURE: 72 MMHG | HEIGHT: 67 IN | SYSTOLIC BLOOD PRESSURE: 108 MMHG | BODY MASS INDEX: 20.25 KG/M2

## 2024-02-27 DIAGNOSIS — G47.9 SLEEP DISORDER: ICD-10-CM

## 2024-02-27 DIAGNOSIS — J45.30 MILD PERSISTENT ASTHMA WITHOUT COMPLICATION (HHS-HCC): Primary | ICD-10-CM

## 2024-02-27 PROCEDURE — 99214 OFFICE O/P EST MOD 30 MIN: CPT | Performed by: INTERNAL MEDICINE

## 2024-02-27 PROCEDURE — 1036F TOBACCO NON-USER: CPT | Performed by: INTERNAL MEDICINE

## 2024-02-27 ASSESSMENT — ENCOUNTER SYMPTOMS
DYSURIA: 0
VOMITING: 0
WHEEZING: 0
RESPIRATORY NEGATIVE: 1
SHORTNESS OF BREATH: 0
HEMATOLOGIC/LYMPHATIC NEGATIVE: 1
NAUSEA: 0
ABDOMINAL PAIN: 0
EYES NEGATIVE: 1
GASTROINTESTINAL NEGATIVE: 1
NEUROLOGICAL NEGATIVE: 1
FEVER: 0
CARDIOVASCULAR NEGATIVE: 1
HEADACHES: 0
MUSCULOSKELETAL NEGATIVE: 1
DIARRHEA: 0
ADENOPATHY: 0
NECK STIFFNESS: 0
ENDOCRINE NEGATIVE: 1
NUMBNESS: 0
LIGHT-HEADEDNESS: 0
CONFUSION: 0
PSYCHIATRIC NEGATIVE: 1
ALLERGIC/IMMUNOLOGIC NEGATIVE: 1
CONSTITUTIONAL NEGATIVE: 1
COUGH: 0
BACK PAIN: 0
CHILLS: 0
ABDOMINAL DISTENTION: 0
EYE DISCHARGE: 0
JOINT SWELLING: 0
PALPITATIONS: 0
CONSTIPATION: 0
AGITATION: 0

## 2024-02-27 NOTE — PROGRESS NOTES
Subjective   Patient ID: Jessi Alvarado is a 23 y.o. female who presents for Follow-up (FU PFT).  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  Negative for chills and fever.   HENT: Negative.  Negative for congestion.    Eyes: Negative.  Negative for discharge.   Respiratory: Negative.  Negative for cough, shortness of breath and wheezing.    Cardiovascular: Negative.  Negative for chest pain, palpitations and leg swelling.   Gastrointestinal: Negative.  Negative for abdominal distention, abdominal pain, constipation, diarrhea, nausea and vomiting.   Endocrine: Negative.    Genitourinary: Negative.  Negative for dysuria and urgency.   Musculoskeletal: Negative.  Negative for back pain, joint swelling and neck stiffness.   Skin: Negative.  Negative for rash.   Allergic/Immunologic: Negative.  Negative for immunocompromised state.   Neurological: Negative.  Negative for light-headedness, numbness and headaches.   Hematological: Negative.  Negative for adenopathy.   Psychiatric/Behavioral: Negative.  Negative for agitation, behavioral problems and confusion.    All other systems reviewed and are negative.      Objective   Physical Exam  Vitals reviewed.   Constitutional:       General: She is not in acute distress.     Appearance: Normal appearance.   HENT:      Head: Normocephalic and atraumatic.      Nose: Nose normal.   Eyes:      Conjunctiva/sclera: Conjunctivae normal.      Pupils: Pupils are equal, round, and reactive to light.   Neck:      Vascular: No carotid bruit.   Cardiovascular:      Rate and Rhythm: Normal rate and regular rhythm.      Pulses: Normal pulses.      Heart sounds:      No gallop.   Pulmonary:      Effort: Pulmonary effort is normal. No respiratory distress.      Breath sounds: Normal breath sounds. No wheezing.   Abdominal:      General: Bowel sounds  "are normal.      Palpations: Abdomen is soft.      Tenderness: There is no abdominal tenderness.   Musculoskeletal:         General: Normal range of motion.      Cervical back: Normal range of motion. No rigidity.   Lymphadenopathy:      Cervical: No cervical adenopathy.   Skin:     General: Skin is warm.      Findings: No rash.   Neurological:      General: No focal deficit present.      Mental Status: She is alert and oriented to person, place, and time.   Psychiatric:         Mood and Affect: Mood normal.         Behavior: Behavior normal.       /72 (BP Location: Left arm, Patient Position: Sitting)   Pulse 88   Ht 1.702 m (5' 7\")   Wt 58.5 kg (129 lb)   BMI 20.20 kg/m²    Hemoglobin A1C   Date/Time Value Ref Range Status   09/08/2023 09:32 AM 5.4 % Final     Comment:          Diagnosis of Diabetes-Adults   Non-Diabetic: < or = 5.6%   Increased risk for developing diabetes: 5.7-6.4%   Diagnostic of diabetes: > or = 6.5%  .       Monitoring of Diabetes                Age (y)     Therapeutic Goal (%)   Adults:          >18           <7.0   Pediatrics:    13-18           <7.5                   7-12           <8.0                   0- 6            7.5-8.5   American Diabetes Association. Diabetes Care 33(S1), Jan 2010.     Assessment/Plan   Problem List Items Addressed This Visit       Mild persistent asthma without complication - Primary    Relevant Medications    mometasone (Asmanex) 110 mcg/ actuation (30) inhaler    Sleep disorder        PFT DW PT    GOES TO [facility] FOR PAP    Try melatonin 1 mg at hs and monitor      MDM    1) COMPLEXITY: 1 OR MORE CHRONIC CONDITION WITH EXACERBATION, OR PROGRESSION OR SIDE EFFECT OF TREATMENT ADDRESSED  2)DATA: TESTS INTERPRETED AND OR ORDERED, TOOK INDEPENDENT HISTORY OR RECORDS REVIEWED  3)RISK: MODERATE RISK DUE TO NATURE OF MEDICAL CONDITIONS/COMORBIDITY OR MEDICATIONS ORDERED OR SURGICAL OR PROCEDURE REFERRAL, .      Fu 6 mo   "

## 2024-03-13 ENCOUNTER — EVALUATION (OUTPATIENT)
Dept: OCCUPATIONAL THERAPY | Facility: CLINIC | Age: 24
End: 2024-03-13
Payer: COMMERCIAL

## 2024-03-13 DIAGNOSIS — M79.644 THUMB PAIN, RIGHT: Primary | ICD-10-CM

## 2024-03-13 DIAGNOSIS — S63.681A OTHER SPRAIN OF RIGHT THUMB, INITIAL ENCOUNTER: ICD-10-CM

## 2024-03-13 PROCEDURE — 97022 WHIRLPOOL THERAPY: CPT | Mod: GO

## 2024-03-13 PROCEDURE — 97165 OT EVAL LOW COMPLEX 30 MIN: CPT | Mod: GO

## 2024-03-13 PROCEDURE — 97110 THERAPEUTIC EXERCISES: CPT | Mod: GO

## 2024-03-13 ASSESSMENT — ENCOUNTER SYMPTOMS
OCCASIONAL FEELINGS OF UNSTEADINESS: 0
LOSS OF SENSATION IN FEET: 0
DEPRESSION: 0

## 2024-03-13 ASSESSMENT — PATIENT HEALTH QUESTIONNAIRE - PHQ9
2. FEELING DOWN, DEPRESSED OR HOPELESS: NOT AT ALL
1. LITTLE INTEREST OR PLEASURE IN DOING THINGS: NOT AT ALL
SUM OF ALL RESPONSES TO PHQ9 QUESTIONS 1 AND 2: 0

## 2024-03-13 ASSESSMENT — PAIN SCALES - GENERAL: PAINLEVEL_OUTOF10: 3

## 2024-03-13 ASSESSMENT — PAIN - FUNCTIONAL ASSESSMENT: PAIN_FUNCTIONAL_ASSESSMENT: 0-10

## 2024-03-13 NOTE — PROGRESS NOTES
Occupational Therapy Evaluation    Patient Name: Jessi Alvarado  MRN: [id number]  Today's Date: 3/13/2024  Time Calculation  Start Time: 0915  Stop Time: 1000  Time Calculation (min): 45 min    Subjective   Current Problem:  Pt arrives stating she has pain even in her splint during work and after work.  Pain:  Pain Assessment  Pain Assessment: 0-10  Pain Score: 3  Pain Type: Chronic pain  Pain Location: Hand  Pain Orientation: Right    Prior Level of Function: Independent     Objective     General Visit Information:  Palpation WNL    ROM WFL    Strength Impaired    Special Tests NA         Precautions:  Precautions  STEADI Fall Risk Score (The score of 4 or more indicates an increased risk of falling): 0  Splinting: Pt has an orthoses she wears for work     General Assessments:  Hand Function  Gross Grasp: Impaired ( R 40# L 45#, lateral R 12# L 10#, 3 jaw R 10# L 12#)  Extremity Assessments:  RUE   RUE : Exceptions to WFL  RUE AROM (degrees)  R Wrist Flexion 0-80: 70 Degrees  R Wrist Extension 0-70: 75 Degrees  LUE   LUE: Within Functional Limits  Thumb ROM Mp 0/50 IP 0/60 Radial abduction 60  Edema WNL    Outcomes  Quick Dash 38.64    TREATMENT  Therapeutic Exercise  Therapeutic Exercise Performed: Yes  Therapeutic Exercise Activity 1: Initiated HEP with blocking IP and MP of R thumb, thumb opposition and isometrics including abduction and flexion.   Access Code: [email or web address]  URL: [email or web address]  Date: 03/13/2024  Prepared by: Gen Bedolla    Exercises  - Seated Thumb IP Flexion AROM with Blocking  - 3 x daily - 7 x weekly - 1 sets - 10 reps - 5 hold  - Thumb AROM MP Blocking  - 3 x daily - 7 x weekly - 1 sets - 10 reps - 5 hold  - Thumb AROM Opposition  - 3 x daily - 7 x weekly - 1 sets - 10 reps - 5 hold  - Seated Isometric Thumb Abduction  - 2 x daily - 7 x weekly - 1 sets - 10 reps - 5 hold  - Seated Isometric Thumb Flexion  - 2 x daily - 7 x weekly - 1 sets - 10 reps - 5  hold  - Seated Isometric Thumb MP Extension  - 2 x daily - 7 x weekly - 1 sets - 10 reps - 5 hold    Manual Therapy  Manual Therapy Performed: Yes  Manual Therapy Activity 1: Applied kinesiotape to inhibit EPL/APL of thumb followed by space correction at snuff box.     Assessment/Plan Pt is a 23 year old female who was hurt on the job December 19, 2023. Pt has good ROM, decreased strength and increased pain. Pt has a manual job and though she wears a splint during work her duties still increase her pain.     OP Plan  OT Plan: Continue to progress  Duration: 6 weeks    Active       OT Goals       OT Goal 1       Start:  03/13/24    Expected End:  04/24/24       Pt will increase R  by 15# in order to perform her job duties with less pain.         OT Goal 2       Start:  03/13/24    Expected End:  04/24/24       Pt will report less pain during and after work to no more than 2/10.         OT Goal 3       Start:  03/13/24    Expected End:  04/24/24       Pt will be independent with HEP carryover in order to increase strength and function of R dominant hand.         OT Goal 4       Start:  03/13/24    Expected End:  04/24/24       Pt will decrease Quick Dash score by half.

## 2024-03-18 ENCOUNTER — TREATMENT (OUTPATIENT)
Dept: OCCUPATIONAL THERAPY | Facility: CLINIC | Age: 24
End: 2024-03-18
Payer: COMMERCIAL

## 2024-03-18 DIAGNOSIS — M79.644 THUMB PAIN, RIGHT: ICD-10-CM

## 2024-03-18 DIAGNOSIS — S63.681A OTHER SPRAIN OF RIGHT THUMB, INITIAL ENCOUNTER: ICD-10-CM

## 2024-03-18 PROCEDURE — 97110 THERAPEUTIC EXERCISES: CPT | Mod: GO,CO

## 2024-03-18 PROCEDURE — 97530 THERAPEUTIC ACTIVITIES: CPT | Mod: GO,CO

## 2024-03-18 ASSESSMENT — PAIN - FUNCTIONAL ASSESSMENT: PAIN_FUNCTIONAL_ASSESSMENT: 0-10

## 2024-03-18 ASSESSMENT — PAIN SCALES - GENERAL: PAINLEVEL_OUTOF10: 3

## 2024-03-18 ASSESSMENT — PAIN DESCRIPTION - DESCRIPTORS: DESCRIPTORS: ACHING;BURNING;DULL

## 2024-03-18 NOTE — PROGRESS NOTES
"Occupational Therapy    Occupational Therapy Treatment    Name: Jessi Alvarado  MRN: [id number]  : 2000  Date: 24  Time Calculation  Start Time: 913  Stop Time: 956  Time Calculation (min): 43 min  Ther Act-20 mins  Ther Ex-23 mins    Assessment: Patient states fluido helps a lot with pain. Demos understanding of T foam exercises, c/o \"slight cramping\" with exercises. Demos understanding of pain management.     Plan: Continue with current POC towards OT goals  OT Plan: Continue to progress  Duration: 6 weeks  Onset Date: 23  Certification Period Start Date: 24  Certification Period End Date: 24  Number of Treatments Authorized: 12  Rehab Potential: Good  Plan of Care Agreement: Patient    Subjective   General: Patient states she is doing well, states she was sore after eval, States heat helps with pain ice makes it work.      General  Reason for Referral: Eval and Treat R Thumb pain  Referred By: Reshma Myers CNP  General Comment: Visit  [place]    Pain Assessment:  Pain Assessment  Pain Assessment: 0-10  Pain Score: 3  Pain Type: Chronic pain  Pain Location: Hand  Pain Orientation: Right  Pain Descriptors: Aching, Burning, Dull    Objective      Modalities:  Modalities Used: Yes  Modality 1: Untimed Fluidotherapy    Therapy/Activity: Therapeutic Exercise  Therapeutic Exercise Performed: Yes  Therapeutic Exercise Activity 1: R hand yellow power web digit flex/ext x 12 w/ 5 second holds each  Therapeutic Exercise Activity 2: R hand on yellow flex bar RD/UD x 12 w/ 5 second holds each  Therapeutic Exercise Activity 3: T foam exercises with pibnk and blue foam blocks and HEP provided with patient demo and understanding    Therapeutic Activity  Therapeutic Activity Performed: Yes  Therapeutic Activity 1: Patient interview and AROM while in fludio x 10 mins  Therapeutic Activity 2: Education on massage  Therapeutic Activity 3: Education on pain " management          Goals:  Active       OT Goals       OT Goal 1       Start:  03/13/24    Expected End:  04/24/24       Pt will increase R  by 15# in order to perform her job duties with less pain.         OT Goal 2       Start:  03/13/24    Expected End:  04/24/24       Pt will report less pain during and after work to no more than 2/10.         OT Goal 3       Start:  03/13/24    Expected End:  04/24/24       Pt will be independent with HEP carryover in order to increase strength and function of R dominant hand.         OT Goal 4       Start:  03/13/24    Expected End:  04/24/24       Pt will decrease Quick Dash score by half.

## 2024-03-21 ENCOUNTER — APPOINTMENT (OUTPATIENT)
Dept: OCCUPATIONAL THERAPY | Facility: CLINIC | Age: 24
End: 2024-03-21
Payer: COMMERCIAL

## 2024-03-25 ENCOUNTER — APPOINTMENT (OUTPATIENT)
Dept: OCCUPATIONAL THERAPY | Facility: CLINIC | Age: 24
End: 2024-03-25
Payer: COMMERCIAL

## 2024-03-25 ENCOUNTER — DOCUMENTATION (OUTPATIENT)
Dept: OCCUPATIONAL THERAPY | Facility: CLINIC | Age: 24
End: 2024-03-25
Payer: COMMERCIAL

## 2024-03-25 NOTE — PROGRESS NOTES
Occupational Therapy                 Therapy Communication Note    Patient Name: Jessi Alvarado  MRN: [id number]  Today's Date: 3/25/2024     Discipline: Occupational Therapy    Missed Visit Reason:   Patient is ill    Missed Time: Cancel    Comment:

## 2024-03-26 ENCOUNTER — OFFICE VISIT (OUTPATIENT)
Dept: URGENT CARE | Facility: CLINIC | Age: 24
End: 2024-03-26
Payer: COMMERCIAL

## 2024-03-26 VITALS
BODY MASS INDEX: 20.4 KG/M2 | RESPIRATION RATE: 16 BRPM | WEIGHT: 130 LBS | SYSTOLIC BLOOD PRESSURE: 109 MMHG | HEIGHT: 67 IN | HEART RATE: 92 BPM | OXYGEN SATURATION: 96 % | DIASTOLIC BLOOD PRESSURE: 58 MMHG | TEMPERATURE: 98 F

## 2024-03-26 DIAGNOSIS — J01.00 ACUTE NON-RECURRENT MAXILLARY SINUSITIS: Primary | ICD-10-CM

## 2024-03-26 DIAGNOSIS — J06.9 ACUTE UPPER RESPIRATORY INFECTION: ICD-10-CM

## 2024-03-26 LAB
POC GROUP A STREP, PCR: NOT DETECTED
POC RAPID INFLUENZA A: NEGATIVE
POC RAPID INFLUENZA B: NEGATIVE

## 2024-03-26 PROCEDURE — 87651 STREP A DNA AMP PROBE: CPT | Mod: QW | Performed by: NURSE PRACTITIONER

## 2024-03-26 PROCEDURE — 87804 INFLUENZA ASSAY W/OPTIC: CPT | Mod: 59 | Performed by: NURSE PRACTITIONER

## 2024-03-26 PROCEDURE — 99213 OFFICE O/P EST LOW 20 MIN: CPT | Performed by: NURSE PRACTITIONER

## 2024-03-26 RX ORDER — AMOXICILLIN 875 MG/1
875 TABLET, FILM COATED ORAL 2 TIMES DAILY
Qty: 14 TABLET | Refills: 0 | Status: SHIPPED | OUTPATIENT
Start: 2024-03-26 | End: 2024-04-02

## 2024-03-26 NOTE — PROGRESS NOTES
23 y.o. female presents with bloody noses, cough, congestion, sweats, chills, sore throat for one week. Patient denies chest pain, SOB, abd pain, n/v/d. Patient states she was taking OTC allergy medication. Patient denies sick contacts.       Vitals:    03/26/24 0931   BP: 109/58   Pulse: 92   Resp: 16   Temp: 36.7 °C (98 °F)   SpO2: 96%       Allergies   Allergen Reactions    Cinnamon Shortness of breath     cinnamon    Pomegranate Fruit Extract Shortness of breath     pomagranate    Sulfa (Sulfonamide Antibiotics) Other    Sulfamethoxazole-Trimethoprim Hives and Other       Medication Documentation Review Audit       Reviewed by Kyara Cary MA (Medical Assistant) on 03/26/24 at 0930      Medication Order Taking? Sig Documenting Provider Last Dose Status   albuterol 2.5 mg /3 mL (0.083 %) nebulizer solution [id number] Yes Inhale 3 mL (2.5 mg) every 6 hours if needed for wheezing. Historical Provider, MD Taking Active   albuterol 90 mcg/actuation inhaler [id number] Yes Inhale 2 puffs every 4 hours if needed for wheezing. Lambert Chaudhari MD Taking Active   ascorbic acid, vitamin C, 500 mg capsule [id number] Yes Take by mouth. Historical Provider, MD Taking Active   benzoyl peroxide 5 % lotion [id number] Yes 1 Application Historical Provider, MD Taking Active   cholecalciferol (Vitamin D-3) 125 MCG (5000 UT) capsule [id number] Yes Take by mouth. Historical Provider, MD Taking Active   diclofenac sodium (Voltaren) 1 % gel gel [id number] Yes Apply 2 grams 4x/daily to affected area Reshma Myers, APRN-CNP Taking Active   elderberry fruit and flower 460-115 mg capsule [id number] Yes Take by mouth. Historical Provider, MD Taking Active   EPINEPHrine 0.3 mg/0.3 mL injection syringe [id number] Yes 0.3 MILLIGRAM(S) INTRAMUSCULARLY ONCE A DAY, AS NEEDED FOR SEVERE ALLERGIC REACTION Lambert Chaudhari MD Taking Active   famotidine (Pepcid) 20 mg tablet [id number]  Take 1 tablet (20 mg) by mouth 2 times a day. Emi Cox MD    24 6642   FreeStyle Xi 3 Sensor device [id number] Yes  Historical Provider, MD Taking Active   ibuprofen 600 mg tablet [id number] Yes Take 1 tablet (600 mg) by mouth every 8 hours if needed for mild pain (1 - 3) or fever (temp greater than 38.0 C). Rubén Carr, APRN-CNP Taking Active   levonorgestreL-ethinyl estrad (Aviane, Alesse, Lessina) 0.1 mg- 20 mcg tablet [id number] Yes Chew 1 tablet once daily. Yoly Logan, APRN-CNM, APRN-CNP, DNP Taking Active   mometasone (Asmanex) 110 mcg/ actuation (30) inhaler [id number] Yes Inhale 1 puff once daily. Rinse mouth with water after use to reduce aftertaste and incidence of candidiasis. Do not swallow. Lambert Chaudhari MD Taking Active   neomycin-polymyxin-HC (Cortisporin) otic solution [id number] Yes  Historical Provider, MD Taking Active   norelgestromin-ethin.estradioL (Xulane) 150-35 mcg/24 hr [id number] Yes Place on the skin. Historical Provider, MD Taking Active   omeprazole (PriLOSEC) 20 mg DR capsule [id number] Yes Take 1 capsule (20 mg) by mouth once daily in the morning. Take before meals. Do not crush or chew. Lambert Chaudhari MD Taking Active   ondansetron ODT (Zofran-ODT) 4 mg disintegrating tablet [id number] Yes Take 1 tablet (4 mg) by mouth every 8 hours if needed for nausea or vomiting for up to 20 doses. Rodrigue Washington MD Taking Active   pedi multivit no.17 w-fluoride (Poly-Vi-Cony) 0.5 mg tablet,chewable [id number] Yes Chew 1 tablet once daily. Historical Provider, MD Taking Active   zinc sulfate (Zincate) 220 (50 Zn) MG capsule [id number] Yes Take by mouth once daily. Historical Provider, MD Taking Active                    Past Medical History:   Diagnosis Date    Attention-deficit hyperactivity disorder, predominantly hyperactive type     Attention deficit hyperactivity disorder (ADHD), predominantly hyperactive type    Concussion with no loss of consciousness 2018    Contusion of left hand 2023    Groin abscess 2023     Hand sprain, left, sequela 04/18/2023    Hematuria 04/18/2023    Left wrist pain 04/18/2023    Left wrist sprain, sequela 04/18/2023    Lump of skin 04/18/2023    Numbness of fingers 04/18/2023    Other conditions influencing health status 09/23/2020    Menarche    Personal history of other diseases of the female genital tract     History of ovarian cyst    Personal history of other diseases of the respiratory system     History of asthma    Post-op pain 04/18/2023    Right knee pain 04/18/2023    Stiffness of right knee, not elsewhere classified 04/18/2023       Past Surgical History:   Procedure Laterality Date    KNEE SURGERY      WISDOM TOOTH EXTRACTION         ROS  See HPI    Physical Exam  Vitals and nursing note reviewed.   Constitutional:       General: She is not in acute distress.     Appearance: Normal appearance. She is normal weight. She is not ill-appearing, toxic-appearing or diaphoretic.   HENT:      Head: Normocephalic and atraumatic.      Right Ear: Tympanic membrane and ear canal normal.      Left Ear: Tympanic membrane and ear canal normal.      Nose: Congestion present.      Mouth/Throat:      Mouth: Mucous membranes are moist.      Pharynx: Oropharynx is clear.   Eyes:      Extraocular Movements: Extraocular movements intact.      Conjunctiva/sclera: Conjunctivae normal.      Pupils: Pupils are equal, round, and reactive to light.   Cardiovascular:      Rate and Rhythm: Normal rate and regular rhythm.   Pulmonary:      Effort: Pulmonary effort is normal. No respiratory distress.      Breath sounds: Normal breath sounds. No stridor. No wheezing, rhonchi or rales.   Lymphadenopathy:      Cervical: Cervical adenopathy present.   Skin:     General: Skin is warm and dry.   Neurological:      General: No focal deficit present.      Mental Status: She is alert and oriented to person, place, and time.   Psychiatric:         Mood and Affect: Mood normal.         Behavior: Behavior normal.       Recent  Results (from the past 1 hour(s))   POCT Group A Streptococcus, PCR manually resulted    Collection Time: 03/26/24 10:07 AM   Result Value Ref Range    POC Group A Strep, PCR Not Detected Not Detected   POCT Influenza A/B manually resulted    Collection Time: 03/26/24 10:23 AM   Result Value Ref Range    POC Rapid Influenza A Negative Negative    POC Rapid Influenza B Negative Negative       Assessment/Plan/MDM  Jessi was seen today for uri.  Diagnoses and all orders for this visit:  Acute non-recurrent maxillary sinusitis (Primary)  -     amoxicillin (Amoxil) 875 mg tablet; Take 1 tablet (875 mg) by mouth 2 times a day for 7 days.  Acute upper respiratory infection  -     Cancel: POCT BD Veritor Triplex Ag  -     POCT Group A Streptococcus, PCR manually resulted  -     POCT Influenza A/B manually resulted    Encouraged pt to continue otc cold remedies PRN, push by mouth fluids and rest. Patient's clinical presentation is otherwise unremarkable at this time. Patient is discharged with instructions to follow-up with primary care or seek emergency medical attention for worsening symptoms or any new concerns.    I did personally review Jessi's past medical history, surgical history, social history, as well as family history (when relevant).  In this case, I also oversaw the her drug management by reviewing her medication list, allergy list, as well as the medications that I prescribed during the UC course and/or recommended as an out-patient (including possible OTC medications such as acetaminophen, NSAIDs , etc).    After reviewing the items above, I did look at previous medical documentation, such as recent hospitalizations, office visits, and/or recent consultations with PCP/specialist.                          SDOH:   Another factor that I considered in Jessi's care was her Social Determinants of Health (SDOH). During this UC encounter, she did not have social determinants of health. Those SDOH influencing Jessi's  care are: none      Frantz Onofre CNP  [facility]  [phone]

## 2024-03-27 ENCOUNTER — TREATMENT (OUTPATIENT)
Dept: OCCUPATIONAL THERAPY | Facility: CLINIC | Age: 24
End: 2024-03-27
Payer: COMMERCIAL

## 2024-03-27 DIAGNOSIS — M79.644 THUMB PAIN, RIGHT: ICD-10-CM

## 2024-03-27 DIAGNOSIS — S63.681A OTHER SPRAIN OF RIGHT THUMB, INITIAL ENCOUNTER: Primary | ICD-10-CM

## 2024-03-27 PROCEDURE — 97530 THERAPEUTIC ACTIVITIES: CPT | Mod: GO,CO

## 2024-03-27 PROCEDURE — 97022 WHIRLPOOL THERAPY: CPT | Mod: GO,CO

## 2024-03-27 ASSESSMENT — PAIN - FUNCTIONAL ASSESSMENT: PAIN_FUNCTIONAL_ASSESSMENT: 0-10

## 2024-03-27 ASSESSMENT — PAIN SCALES - GENERAL: PAINLEVEL_OUTOF10: 4

## 2024-03-27 ASSESSMENT — PAIN DESCRIPTION - DESCRIPTORS: DESCRIPTORS: DULL

## 2024-03-27 NOTE — PROGRESS NOTES
"Occupational Therapy    Occupational Therapy Treatment    Name: Jessi Alvarado  MRN: [id number]  : 2000  Date: 24  Time Calculation  Start Time: 852  Stop Time: 930  Time Calculation (min): 38 min  Fluido-8 mins  Ther Act 30 mins    Assessment: Patient states fluido helps with pain, demos understanding of continuing exercises to promote healing and increasing strength.    Plan: Continue with current POC towards OT goals  OT Plan: Continue to progress  Duration: 6 weeks  Onset Date: 23  Certification Period Start Date: 24  Certification Period End Date: 24  Number of Treatments Authorized: 12  Rehab Potential: Good  Plan of Care Agreement: Patient    Subjective   General: Patient 7 minutes late to apt this is patients second treatment. States Thumb feels about the same. Patient states she does not do exercises with thumb is sore from work. States she wears Thumb Spica splint for work duties.    General  Reason for Referral: Eval and Treat R Thumb pain  Referred By: Reshma Myers CNP  General Comment: Visit  [place]    Pain Assessment:  Pain Assessment  Pain Assessment: 0-10  Pain Score: 4  Pain Type: Chronic pain  Pain Location: Finger (Comment which one) (R D1)  Pain Orientation: Right  Pain Descriptors: Dull    Objective      Modalities:  Modalities Used: Yes  Modality 1: Untimed Fluidotherapy    Therapy/Activity:   Therapeutic Activity  Therapeutic Activity Performed: Yes  Therapeutic Activity 1: RUE AROM over weighted ball flex/ext x 10 w/ 10 second holds  Therapeutic Activity 2: R D1 alternating w/ D2-D5 pinching loops on Velcro blocks to take off of grid x 30  Therapeutic Activity 3: R D1/D5 pinching 1\" Velcro blocks to place onto grid x 30          Goals:  Active       OT Goals       OT Goal 1       Start:  24    Expected End:  24       Pt will increase R  by 15# in order to perform her job duties with less pain.         OT Goal 2       Start:  " 03/13/24    Expected End:  04/24/24       Pt will report less pain during and after work to no more than 2/10.         OT Goal 3       Start:  03/13/24    Expected End:  04/24/24       Pt will be independent with HEP carryover in order to increase strength and function of R dominant hand.         OT Goal 4       Start:  03/13/24    Expected End:  04/24/24       Pt will decrease Quick Dash score by half.

## 2024-04-01 ENCOUNTER — TREATMENT (OUTPATIENT)
Dept: OCCUPATIONAL THERAPY | Facility: CLINIC | Age: 24
End: 2024-04-01
Payer: COMMERCIAL

## 2024-04-01 DIAGNOSIS — M79.644 THUMB PAIN, RIGHT: ICD-10-CM

## 2024-04-01 DIAGNOSIS — S63.681A OTHER SPRAIN OF RIGHT THUMB, INITIAL ENCOUNTER: ICD-10-CM

## 2024-04-01 PROCEDURE — 97110 THERAPEUTIC EXERCISES: CPT | Mod: GO

## 2024-04-01 PROCEDURE — 97035 APP MDLTY 1+ULTRASOUND EA 15: CPT | Mod: GO

## 2024-04-01 ASSESSMENT — PAIN SCALES - GENERAL: PAINLEVEL_OUTOF10: 3

## 2024-04-01 ASSESSMENT — PAIN - FUNCTIONAL ASSESSMENT: PAIN_FUNCTIONAL_ASSESSMENT: 0-10

## 2024-04-01 NOTE — PROGRESS NOTES
Occupational Therapy Treatment    Patient Name: Jessi Alvarado  MRN: [id number]  Today's Date: 4/1/2024  Time Calculation  Start Time: 0915  Stop Time: 1000  Time Calculation (min): 45 min    Subjective   Current Problem:  Pt states she still has pain because she moves her hands all day long.   Pain:  Pain Assessment  Pain Assessment: 0-10  Pain Score: 3  Pain Type: Chronic pain  Pain Orientation: Right    Objective    R 45# L 55#  Lateral R 12# L 10#  3 Jaw R 10# L10#        Treatment:  Therapeutic Exercise  Therapeutic Exercise Performed: Yes  Therapeutic Exercise Activity 1: Initiated theraputty for  and pinch strength.  Issued pink putty.  See TE below.  Access Code: [email or web address]  URL: [email or web address]  Date: 04/01/2024  Prepared by: Gen Bedolla    Exercises  - Putty Squeezes  - 2 x daily - 7 x weekly - 1 sets - 10 reps - 3 hold  - Rolling Putty on Table  - 2 x daily - 7 x weekly - 1 sets - 10 reps - 3 hold  - 3-Point Pinch with Putty  - 2 x daily - 7 x weekly - 1 sets - 10 reps - 3 hold  - Key Pinch with Putty  - 2 x daily - 7 x weekly - 1 sets - 10 reps - 3 hold  - Tip Pinch with Putty  - 2 x daily - 7 x weekly - 1 sets - 10 reps - 3 hold  - Finger Extension with Putty  - 2 x daily - 7 x weekly - 1 sets - 5 reps - 3 hold  Therapeutic Activity  Therapeutic Activity Performed: Yes  Therapeutic Activity 1: Educated pt on relaxation techniques such as apps to use for meditation, breathing exercises and mindfulness activities.  Therapeutic Activity 2: Issued neoprene Benik wrap size medium for R thumb.    Other Activity  Other Activity Performed: Yes  Other Activity 1: Initiated ultrasound 0.8 w/cm2, continuous, 3 mhz for 8 min to dorsal first and second compartments.     Assessment/Plan Pt is making progress with increase in  strength. Pain is down. Pt compliant with HEP.     OP Plan  OT Plan: Continue to increase strength.  Duration: 6 weeks    GOALS:  Active       OT Goals        OT Goal 1 (Progressing)       Start:  03/13/24    Expected End:  04/24/24       Pt will increase R  by 15# in order to perform her job duties with less pain.         OT Goal 2 (Progressing)       Start:  03/13/24    Expected End:  04/24/24       Pt will report less pain during and after work to no more than 2/10.         OT Goal 3 (Progressing)       Start:  03/13/24    Expected End:  04/24/24       Pt will be independent with HEP carryover in order to increase strength and function of R dominant hand.         OT Goal 4       Start:  03/13/24    Expected End:  04/24/24       Pt will decrease Quick Dash score by half.

## 2024-04-02 ENCOUNTER — OFFICE VISIT (OUTPATIENT)
Dept: ORTHOPEDIC SURGERY | Facility: CLINIC | Age: 24
End: 2024-04-02
Payer: COMMERCIAL

## 2024-04-02 VITALS — WEIGHT: 133.2 LBS | BODY MASS INDEX: 20.86 KG/M2

## 2024-04-02 DIAGNOSIS — S63.681A OTHER SPRAIN OF RIGHT THUMB, INITIAL ENCOUNTER: ICD-10-CM

## 2024-04-02 DIAGNOSIS — S60.011A CONTUSION OF RIGHT THUMB WITHOUT DAMAGE TO NAIL, INITIAL ENCOUNTER: Primary | ICD-10-CM

## 2024-04-02 PROCEDURE — 99213 OFFICE O/P EST LOW 20 MIN: CPT | Performed by: NURSE PRACTITIONER

## 2024-04-02 ASSESSMENT — PAIN - FUNCTIONAL ASSESSMENT: PAIN_FUNCTIONAL_ASSESSMENT: 0-10

## 2024-04-02 ASSESSMENT — ENCOUNTER SYMPTOMS
CONSTITUTIONAL NEGATIVE: 1
HEMATOLOGIC/LYMPHATIC NEGATIVE: 1
ENDOCRINE NEGATIVE: 1
NEUROLOGICAL NEGATIVE: 1
CARDIOVASCULAR NEGATIVE: 1
RESPIRATORY NEGATIVE: 1
PSYCHIATRIC NEGATIVE: 1
ARTHRALGIAS: 1

## 2024-04-02 ASSESSMENT — PAIN DESCRIPTION - DESCRIPTORS: DESCRIPTORS: ACHING

## 2024-04-02 ASSESSMENT — PAIN SCALES - GENERAL: PAINLEVEL_OUTOF10: 2

## 2024-04-02 NOTE — ASSESSMENT & PLAN NOTE
We reviewed use of brace issued from OT with work.  Continue with OT as scheduled and home exercises daily.  We reviewed use of OTC ibuprofen, Tylenol, topical remedies and Epsom salt soaks for symptom control.  [employer] updated with same restrictions to work as brace allows.  Plan will be to follow-up here in approximately 1 month after completing OT, sooner for changes or concerns. .  Patient in agreement with plan of care.    This note was generated using Dragon software.  It may contain errors in wording, punctuation or spelling.

## 2024-04-02 NOTE — PROGRESS NOTES
/Subjective    Patient ID: Jessi Alvarado is a 23 y.o. female.    Chief Complaint: Follow-up and Worker's Compensation of the Right Thumb     [facility] inj DOI 12/19/2023    HPI  Jessi is a pleasant 23-year-old female presenting today of FUV R thumb injury at work   12/19/2023 metal bar struck outer R thumb  Working within restrictions from previous [employer], working as brace allows  Patient has been attending OT, believes she is improving with range of motion, pain control and strength.  Patient stopped wearing the full brace and OT set her up with a short brace to wear with work to avoid repetitive tasks to the sore remaining area.  Patient states symptoms are improving, rates approximately 70 to 80% improved from time of injury.  Patient does take an occasional ibuprofen which does help some.  Patient is completing her home exercises as recommended twice daily.  Heat and topical IcyHot also help with symptoms.  RH dominant    Review of Systems   Constitutional: Negative.    HENT: Negative.     Respiratory: Negative.     Cardiovascular: Negative.    Endocrine: Negative.    Musculoskeletal:  Positive for arthralgias.   Skin: Negative.    Neurological: Negative.    Hematological: Negative.    Psychiatric/Behavioral: Negative.         Objective   Right Hand Exam     Tenderness   Right hand tenderness location: 1st metacarpal dorsal aspect.    Range of Motion   The patient has normal right wrist ROM.     Tests   Finkelstein's test: negative    Other   Erythema: absent  Sensation: normal  Pulse: present    Comments:  Skin is pink, warm, dry and intact.  Full range of motion of wrist in all directions with no symptom aggravation. Negative Finkelstein's.  Distal motor and sensory intact, cap refill at 2 seconds.  Mild decreased thumb extension against resistance compared to opposite.            Image Results:  === 12/19/23 ===    XR HAND 3+ VIEWS RIGHT    - Impression -  No acute osseous abnormalities.  Signed by Yanikc  MD Raimundo     OT note review: Reviewed most recent therapy assessment note from yesterday, patient is progressing but continues to have some  and grasp strength weakness.  Home exercises have been instructed for daily use, patient is compliant.    Assessment/Plan   Encounter Diagnoses:        Problem List Items Addressed This Visit             ICD-10-CM    Other sprain of right thumb, initial encounter S63.681A    Contusion of right thumb without damage to nail - Primary S60.011A     We reviewed use of brace issued from OT with work.  Continue with OT as scheduled and home exercises daily.  We reviewed use of OTC ibuprofen, Tylenol, topical remedies and Epsom salt soaks for symptom control.  [employer] updated with same restrictions to work as brace allows.  Plan will be to follow-up here in approximately 1 month, sooner for changes or concerns.  I did advise the patient if she feels she is ready to start advancing she may move the appointment up.  Patient in agreement with plan of care.  [employer] updated with same restrictions  This note was generated using Dragon software.  It may contain errors in wording, punctuation or spelling.           Relevant Orders    Follow Up In Orthopaedic Surgery

## 2024-04-04 ENCOUNTER — TREATMENT (OUTPATIENT)
Dept: OCCUPATIONAL THERAPY | Facility: CLINIC | Age: 24
End: 2024-04-04
Payer: COMMERCIAL

## 2024-04-04 DIAGNOSIS — S63.681A OTHER SPRAIN OF RIGHT THUMB, INITIAL ENCOUNTER: ICD-10-CM

## 2024-04-04 DIAGNOSIS — M79.644 THUMB PAIN, RIGHT: ICD-10-CM

## 2024-04-04 PROCEDURE — 97110 THERAPEUTIC EXERCISES: CPT | Mod: GO,CO

## 2024-04-04 PROCEDURE — 97530 THERAPEUTIC ACTIVITIES: CPT | Mod: GO,CO

## 2024-04-04 ASSESSMENT — PAIN SCALES - GENERAL: PAINLEVEL_OUTOF10: 4

## 2024-04-04 ASSESSMENT — PAIN DESCRIPTION - DESCRIPTORS: DESCRIPTORS: ACHING

## 2024-04-04 ASSESSMENT — PAIN - FUNCTIONAL ASSESSMENT: PAIN_FUNCTIONAL_ASSESSMENT: 0-10

## 2024-04-04 NOTE — PROGRESS NOTES
"Occupational Therapy    Occupational Therapy Treatment    Name: Jessi Alvarado  MRN: [id number]  : 2000  Date: 24  Time Calculation  Start Time: 0845  Stop Time: 930  Time Calculation (min): 45 min    Assessment: Patient demos fatigue with flex bar exercise and cone stacking activity in RUE, no increase in pain reported throughout session.     Plan: Continue w/ current POC towards OT goals  OT Plan: Continue to progress  Duration: 6 weeks  Onset Date: 23  Certification Period Start Date: 24  Certification Period End Date: 24  Number of Treatments Authorized: 12  Rehab Potential: Good  Plan of Care Agreement: Patient    Subjective   General: Patient states she has a dull ache in hand from working, states she can feel it when she is driving.   General  Reason for Referral: Eval and Treat R Thumb pain  Referred By: Reshma Myers CNP  General Comment: Visit  [place]    Pain Assessment:  Pain Assessment  Pain Assessment: 0-10  Pain Score: 4  Pain Type: Chronic pain  Pain Orientation: Right  Pain Descriptors: Aching    Objective      Modalities:  Modalities Used: Yes  Modality 1: Untimed Fluidotherapy (fluido x 10 w/ AROM and squeezing foam blocks)    Therapy/Activity: Therapeutic Exercise  Therapeutic Exercise Performed: Yes  Therapeutic Exercise Activity 1: R hand squeeze and release on ball x 12 w/ 10 second holds  Therapeutic Exercise Activity 2: Red flex bar U shape/rainbow shape x 12 w/ 5 second holds each    Therapeutic Activity  Therapeutic Activity 1: R hand on hand helper 10#s resistance stacking/un-stacking 1\" blocks into towers of 10 x 3 trials  Therapeutic Activity 2: Patient AROM and squeezing foam blocks while in fluido x 10 mins  Therapeutic Activity 3: R hand unstacking/stacking cones sup/pro x 10 cones x 3 trials      Goals:  Active       OT Goals       OT Goal 1 (Progressing)       Start:  24    Expected End:  24       Pt will increase R  " by 15# in order to perform her job duties with less pain.         OT Goal 2 (Progressing)       Start:  03/13/24    Expected End:  04/24/24       Pt will report less pain during and after work to no more than 2/10.         OT Goal 3 (Progressing)       Start:  03/13/24    Expected End:  04/24/24       Pt will be independent with HEP carryover in order to increase strength and function of R dominant hand.         OT Goal 4       Start:  03/13/24    Expected End:  04/24/24       Pt will decrease Quick Dash score by half.

## 2024-04-05 ENCOUNTER — PROCEDURE VISIT (OUTPATIENT)
Dept: OBSTETRICS AND GYNECOLOGY | Facility: CLINIC | Age: 24
End: 2024-04-05
Payer: COMMERCIAL

## 2024-04-05 VITALS
WEIGHT: 135.8 LBS | DIASTOLIC BLOOD PRESSURE: 64 MMHG | HEIGHT: 67 IN | BODY MASS INDEX: 21.31 KG/M2 | SYSTOLIC BLOOD PRESSURE: 112 MMHG

## 2024-04-05 DIAGNOSIS — Z30.46 ENCOUNTER FOR NEXPLANON REMOVAL: Primary | ICD-10-CM

## 2024-04-05 PROCEDURE — 99213 OFFICE O/P EST LOW 20 MIN: CPT | Performed by: OBSTETRICS & GYNECOLOGY

## 2024-04-05 NOTE — LETTER
April 5, 2024     Patient: Jessi Alvarado   YOB: 2000   Date of Visit: 4/5/2024       To Whom It May Concern:    Jessi Alvarado was seen in my clinic on 4/5/2024 at 10:30 am. Jessi did have a procedure done.     If you have any questions or concerns, please don't hesitate to call.         Sincerely,         Edouard Crisostomo MD        CC: No Recipients

## 2024-04-05 NOTE — PROGRESS NOTES
Jessi Alvarado is a 23 y.o. year old female patient.  PCP = Lambert Chaudhari MD    Chief Complaint   Patient presents with    Procedure     Patient is here for Nexplanon removal.       HPI   Presents for removal of the Nexplanon from the right inner arm.  The current Nexplanon was placed by Yoly.  A previous attempt by Yoly was unsuccessful in removing the current Nexplanon.    OB History          0    Para   0    Term   0       0    AB   0    Living   0         SAB   0    IAB   0    Ectopic   0    Multiple   0    Live Births   0                 Past Medical History:   Diagnosis Date    Attention-deficit hyperactivity disorder, predominantly hyperactive type     Attention deficit hyperactivity disorder (ADHD), predominantly hyperactive type    Concussion with no loss of consciousness 2018    Contusion of left hand 2023    Groin abscess 2023    Hand sprain, left, sequela 2023    Hematuria 2023    Left wrist pain 2023    Left wrist sprain, sequela 2023    Lump of skin 2023    Numbness of fingers 2023    Other conditions influencing health status 2020    Menarche    Personal history of other diseases of the female genital tract     History of ovarian cyst    Personal history of other diseases of the respiratory system     History of asthma    Post-op pain 2023    Right knee pain 2023    Stiffness of right knee, not elsewhere classified 2023       Past Surgical History:   Procedure Laterality Date    KNEE SURGERY      WISDOM TOOTH EXTRACTION         Review of Systems:   Constitutional: No fever or chills  Respiratory: No shortness of breath, or cough  Cardiovascular: No chest pain or syncope  Breasts: No breast pain, no masses, no nipple discharge  Gastrointestinal: No nausea, vomiting, or diarrhea, no abdominal pain  Genitourinary: No dysuria or frequency  Gynecology: Negative except as noted in history of present  illness  All other: All other systems reviewed and negative for complaint    Medication Documentation Review Audit       Reviewed by Edouard Crisostomo MD (Physician) on 24 at 1110      Medication Order Taking? Sig Documenting Provider Last Dose Status   albuterol 2.5 mg /3 mL (0.083 %) nebulizer solution [id number] No Inhale 3 mL (2.5 mg) every 6 hours if needed for wheezing. Historical Provider, MD Taking Active   albuterol 90 mcg/actuation inhaler [id number] No Inhale 2 puffs every 4 hours if needed for wheezing. Lambert Chaudhari MD Taking Active   amoxicillin (Amoxil) 875 mg tablet [id number] No Take 1 tablet (875 mg) by mouth 2 times a day for 7 days. LAY Torres-CNP Taking  24 235   ascorbic acid, vitamin C, 500 mg capsule [id number] No Take by mouth. Historical Provider, MD Taking Active   benzoyl peroxide 5 % lotion [id number] No 1 Application Historical Provider, MD Taking Active   cholecalciferol (Vitamin D-3) 125 MCG (5000 UT) capsule [id number] No Take by mouth. Historical Provider, MD Taking Active   diclofenac sodium (Voltaren) 1 % gel gel [id number] No Apply 2 grams 4x/daily to affected area LAY Recinos-CNP Taking Active   elderberry fruit and flower 460-115 mg capsule [id number] No Take by mouth. Historical Provider, MD Taking Active   EPINEPHrine 0.3 mg/0.3 mL injection syringe [id number] No 0.3 MILLIGRAM(S) INTRAMUSCULARLY ONCE A DAY, AS NEEDED FOR SEVERE ALLERGIC REACTION Lambert Chaudhari MD Taking Active   famotidine (Pepcid) 20 mg tablet [id number] No Take 1 tablet (20 mg) by mouth 2 times a day. Emi Cox MD Taking  24 2359   FreeStyle Xi 3 Sensor device [id number] No  Historical Provider, MD Taking Active   ibuprofen 600 mg tablet [id number] No Take 1 tablet (600 mg) by mouth every 8 hours if needed for mild pain (1 - 3) or fever (temp greater than 38.0 C). LAY Gomez-CNP Taking Active   levonorgestreL-ethinyl estrad (Aviane,  "Alesse, Lessina) 0.1 mg- 20 mcg tablet [id number] No Chew 1 tablet once daily. Yoly Logan, APRN-CNM, APRN-CNP, DNP Taking Active   mometasone (Asmanex) 110 mcg/ actuation (30) inhaler [id number] No Inhale 1 puff once daily. Rinse mouth with water after use to reduce aftertaste and incidence of candidiasis. Do not swallow. Lambert Chaudhari MD Taking  24 2208   neomycin-polymyxin-HC (Cortisporin) otic solution [id number] No  Historical Provider, MD Taking Active   norelgestromin-ethin.estradioL (Xulane) 150-35 mcg/24 hr [id number] No Place on the skin. Historical Provider, MD Taking Active   omeprazole (PriLOSEC) 20 mg DR capsule [id number] No Take 1 capsule (20 mg) by mouth once daily in the morning. Take before meals. Do not crush or chew. Lambert Chaudhari MD Taking Active   ondansetron ODT (Zofran-ODT) 4 mg disintegrating tablet [id number] No Take 1 tablet (4 mg) by mouth every 8 hours if needed for nausea or vomiting for up to 20 doses. Rodrigue Washington MD Taking Active   pedi multivit no.17 w-fluoride (Poly-Vi-Cony) 0.5 mg tablet,chewable [id number] No Chew 1 tablet once daily. Historical Provider, MD Taking Active   zinc sulfate (Zincate) 220 (50 Zn) MG capsule [id number] No Take by mouth once daily. Historical Provider, MD Taking Active                     /64   Ht 1.702 m (5' 7\")   Wt 61.6 kg (135 lb 12.8 oz)   LMP  (LMP Unknown)   BMI 21.27 kg/m²     PHYSICAL EXAMINATION:  Well-developed, well nourished, in no acute distress, alert and oriented x three, is pleasant and cooperative.  HEENT: Clear. Pupils equal, round and reactive to light and accommodation. Extraocular muscles are intact. Oral mucosa pink without exudate.   NECK: No lymphadenopathy, no thyromegaly.  LUNGS: Clear bilaterally.  HEART: Regular rate and rhythm without murmurs.  ABDOMEN: Normoactive bowel sounds, soft and nontender, no guarding or rebound tenderness, no CVA tenderness.  EXTREMITIES: No clubbing, cyanosis or edema. "  Palpation of the Nexplanon in the right inner arm reveals that the implant is relatively deep under the skin and therefore difficult to identify the tip of the implant.  The incision site was prepped and draped usual sterile fashion.  Skin was locally infiltrated with 1% lidocaine.  A 5 mm stab wound was made at what is felt to be the distal end of the implant.  A second stab wound incision was made approximately 5 mm more distal as it was felt that the tip of the implant may actually be a bit lower.  However, the tip of the implant could not be identified.  Therefore, the procedure was ended and the Nexplanon implant is still in place.  Steri-Strips and gauze wrap was used for dressing.  Patient instructed to leave the gauze wrap on for 5 days and then may remove the Steri-Strips in 7 days.  NEUROLOGIC:  Cranial nerves II-XII grossly intact.          Orders Placed This Encounter   Procedures    Referral to General Surgery     Standing Status:   Future     Standing Expiration Date:   10/5/2024     Referral Priority:   Routine     Referral Type:   Consultation     Referral Reason:   Specialty Services Required     Requested Specialty:   General Surgery     Number of Visits Requested:   1        Problem List Items Addressed This Visit    None  Visit Diagnoses       Encounter for Nexplanon removal    -  Primary    Relevant Orders    Referral to General Surgery             Provider Impression:  1.  Unsuccessful removal of Nexplanon  Recommend referring her to general surgeon for removal of the Nexplanon in the OR setting since it is deeper than typically can be removed in the office setting.

## 2024-04-08 ENCOUNTER — DOCUMENTATION (OUTPATIENT)
Dept: OCCUPATIONAL THERAPY | Facility: CLINIC | Age: 24
End: 2024-04-08
Payer: COMMERCIAL

## 2024-04-08 ENCOUNTER — APPOINTMENT (OUTPATIENT)
Dept: OCCUPATIONAL THERAPY | Facility: CLINIC | Age: 24
End: 2024-04-08
Payer: COMMERCIAL

## 2024-04-08 NOTE — PROGRESS NOTES
Occupational Therapy                 Therapy Communication Note    Patient Name: Jessi Alvarado  MRN: [id number]  Today's Date: 4/8/2024     Discipline: Occupational Therapy    Missed Visit Reason:   Patient was called into work early     Missed Time: Cancel    Comment:

## 2024-04-11 ENCOUNTER — TREATMENT (OUTPATIENT)
Dept: OCCUPATIONAL THERAPY | Facility: CLINIC | Age: 24
End: 2024-04-11
Payer: COMMERCIAL

## 2024-04-11 DIAGNOSIS — S63.681A OTHER SPRAIN OF RIGHT THUMB, INITIAL ENCOUNTER: ICD-10-CM

## 2024-04-11 DIAGNOSIS — M79.644 THUMB PAIN, RIGHT: ICD-10-CM

## 2024-04-11 PROCEDURE — 97110 THERAPEUTIC EXERCISES: CPT | Mod: GO,CO

## 2024-04-11 ASSESSMENT — PAIN - FUNCTIONAL ASSESSMENT: PAIN_FUNCTIONAL_ASSESSMENT: 0-10

## 2024-04-11 ASSESSMENT — PAIN SCALES - GENERAL: PAINLEVEL_OUTOF10: 1

## 2024-04-11 ASSESSMENT — PAIN DESCRIPTION - DESCRIPTORS: DESCRIPTORS: DULL

## 2024-04-11 NOTE — PROGRESS NOTES
Occupational Therapy    Occupational Therapy Treatment    Name: Jessi Alvarado  MRN: [id number]  : 2000  Date: 24  Time Calculation  Start Time: 845  Stop Time: 933  Time Calculation (min): 48 min    Assessment: Patient tolerating exercises well, no increase in pain reported throughout session.    Plan: Continue with current POC towards OT goals  OT Plan: Continue to progress  Duration: 6 weeks  Onset Date: 23  Certification Period Start Date: 24  Certification Period End Date: 24  Number of Treatments Authorized: 12  Rehab Potential: Good  Plan of Care Agreement: Patient    Subjective   General: Patient states she is compliant with exercises, states hand is feeling good today.    General  Reason for Referral: Eval and Treat R Thumb pain  Referred By: Reshma Myers CNP  General Comment: Visit  [place]    Pain Assessment:  Pain Assessment  Pain Assessment: 0-10  Pain Score: 1  Pain Type: Chronic pain  Pain Orientation: Right (Thumb)  Pain Descriptors: Dull    Objective      Modalities:  Modalities Used: Yes  Modality 1: Untimed Fluidotherapy    Therapy/Activity: Therapeutic Exercise  Therapeutic Exercise Performed: Yes  Therapeutic Exercise Activity 1: R hand squeeze and release on ball x 15 w/ 10 second holds  Therapeutic Exercise Activity 2: Red flex bar U shape/rainbow shape x 15 w/ 5 second holds each  Therapeutic Exercise Activity 3: R hand red power web thumb flex/ext x 15 w/ 5 second holds each  Therapeutic Exercise Activity 4: Putty resistance increase to green w/ patient demo and understanding  Therapeutic Exercise Activity 5: Patient interview and AROM while in fluido      Goals:  Active       OT Goals       OT Goal 1 (Progressing)       Start:  24    Expected End:  24       Pt will increase R  by 15# in order to perform her job duties with less pain.         OT Goal 2 (Progressing)       Start:  24    Expected End:  24       Pt  will report less pain during and after work to no more than 2/10.         OT Goal 3 (Progressing)       Start:  03/13/24    Expected End:  04/24/24       Pt will be independent with HEP carryover in order to increase strength and function of R dominant hand.         OT Goal 4       Start:  03/13/24    Expected End:  04/24/24       Pt will decrease Quick Dash score by half.

## 2024-04-15 ENCOUNTER — APPOINTMENT (OUTPATIENT)
Dept: OCCUPATIONAL THERAPY | Facility: CLINIC | Age: 24
End: 2024-04-15
Payer: COMMERCIAL

## 2024-04-16 ENCOUNTER — APPOINTMENT (OUTPATIENT)
Dept: OBSTETRICS AND GYNECOLOGY | Facility: CLINIC | Age: 24
End: 2024-04-16
Payer: COMMERCIAL

## 2024-04-17 ENCOUNTER — TELEPHONE (OUTPATIENT)
Dept: PRIMARY CARE | Facility: CLINIC | Age: 24
End: 2024-04-17

## 2024-04-17 ENCOUNTER — OFFICE VISIT (OUTPATIENT)
Dept: PRIMARY CARE | Facility: CLINIC | Age: 24
End: 2024-04-17
Payer: COMMERCIAL

## 2024-04-17 VITALS
BODY MASS INDEX: 21.36 KG/M2 | OXYGEN SATURATION: 97 % | DIASTOLIC BLOOD PRESSURE: 55 MMHG | HEIGHT: 67 IN | SYSTOLIC BLOOD PRESSURE: 104 MMHG | WEIGHT: 136.1 LBS | HEART RATE: 89 BPM

## 2024-04-17 DIAGNOSIS — G47.61 PERIODIC LIMB MOVEMENT: ICD-10-CM

## 2024-04-17 DIAGNOSIS — G47.9 RESTLESS SLEEPER: ICD-10-CM

## 2024-04-17 DIAGNOSIS — I95.9 HYPOTENSIVE EPISODE: ICD-10-CM

## 2024-04-17 DIAGNOSIS — G47.00 INSOMNIA, UNSPECIFIED TYPE: ICD-10-CM

## 2024-04-17 DIAGNOSIS — G47.52 SLEEP BEHAVIOR DISORDER, REM: ICD-10-CM

## 2024-04-17 DIAGNOSIS — F41.9 ANXIETY: Primary | ICD-10-CM

## 2024-04-17 DIAGNOSIS — F51.5 BAD DREAMS: ICD-10-CM

## 2024-04-17 PROCEDURE — 1036F TOBACCO NON-USER: CPT | Performed by: INTERNAL MEDICINE

## 2024-04-17 PROCEDURE — 99214 OFFICE O/P EST MOD 30 MIN: CPT | Performed by: INTERNAL MEDICINE

## 2024-04-17 RX ORDER — BUSPIRONE HYDROCHLORIDE 5 MG/1
5 TABLET ORAL 3 TIMES DAILY PRN
Qty: 90 TABLET | Refills: 11 | Status: SHIPPED | OUTPATIENT
Start: 2024-04-17 | End: 2025-04-17

## 2024-04-17 ASSESSMENT — ENCOUNTER SYMPTOMS
CARDIOVASCULAR NEGATIVE: 1
VOMITING: 0
EYES NEGATIVE: 1
NAUSEA: 0
MUSCULOSKELETAL NEGATIVE: 1
CHILLS: 0
CONSTIPATION: 0
SHORTNESS OF BREATH: 0
WHEEZING: 0
AGITATION: 0
LIGHT-HEADEDNESS: 0
RHINORRHEA: 0
FEVER: 0
COUGH: 0
WEAKNESS: 0
DYSURIA: 0
SLEEP DISTURBANCE: 1
ENDOCRINE NEGATIVE: 1
NEUROLOGICAL NEGATIVE: 1
ABDOMINAL DISTENTION: 0
PALPITATIONS: 0
BACK PAIN: 0
DIZZINESS: 0
DIARRHEA: 0
NERVOUS/ANXIOUS: 1
HEADACHES: 0
ABDOMINAL PAIN: 0
GASTROINTESTINAL NEGATIVE: 1

## 2024-04-17 ASSESSMENT — PATIENT HEALTH QUESTIONNAIRE - PHQ9
SUM OF ALL RESPONSES TO PHQ QUESTIONS 1-9: 11
7. TROUBLE CONCENTRATING ON THINGS, SUCH AS READING THE NEWSPAPER OR WATCHING TELEVISION: NOT AT ALL
2. FEELING DOWN, DEPRESSED OR HOPELESS: NEARLY EVERY DAY
4. FEELING TIRED OR HAVING LITTLE ENERGY: SEVERAL DAYS
SUM OF ALL RESPONSES TO PHQ9 QUESTIONS 1 AND 2: 6
1. LITTLE INTEREST OR PLEASURE IN DOING THINGS: NEARLY EVERY DAY
9. THOUGHTS THAT YOU WOULD BE BETTER OFF DEAD, OR OF HURTING YOURSELF: NOT AT ALL
3. TROUBLE FALLING OR STAYING ASLEEP OR SLEEPING TOO MUCH: MORE THAN HALF THE DAYS
10. IF YOU CHECKED OFF ANY PROBLEMS, HOW DIFFICULT HAVE THESE PROBLEMS MADE IT FOR YOU TO DO YOUR WORK, TAKE CARE OF THINGS AT HOME, OR GET ALONG WITH OTHER PEOPLE: SOMEWHAT DIFFICULT
5. POOR APPETITE OR OVEREATING: SEVERAL DAYS
6. FEELING BAD ABOUT YOURSELF - OR THAT YOU ARE A FAILURE OR HAVE LET YOURSELF OR YOUR FAMILY DOWN: SEVERAL DAYS
8. MOVING OR SPEAKING SO SLOWLY THAT OTHER PEOPLE COULD HAVE NOTICED. OR THE OPPOSITE, BEING SO FIGETY OR RESTLESS THAT YOU HAVE BEEN MOVING AROUND A LOT MORE THAN USUAL: NOT AT ALL

## 2024-04-17 NOTE — PROGRESS NOTES
Subjective   Patient ID: Jessi Alvarado is a 23 y.o. female who presents for Follow-up (WANTS A REFERRAL FOR SLEEP STUDY. ALSO WANTS TO DISCUSS ANXIETY MEDS).  HPI  ANXIETY ,RESTLESS SLEEP WITH BAD DREAMS, PERIODIC LIMB MOVEMENT DURING SLEEP WITH INSOMNIA .WAS SLEEPWALKER DURING CHILDHOOD WHICH IS RESOLVED.  . DENIES USING DRUGS OR DRINKING THE ETOH. HAS BCP IMPLANT FOR REGULATIONS OF MENSTRUATION , BUT DIDN'T HELP MUCH WITH ANXIETY . BP IS LOWER THAN HER BASELINE TODAY,BUT IS ASYMPTOMATIC.  Review of Systems   Constitutional:  Negative for chills and fever.   HENT: Negative.  Negative for congestion, postnasal drip and rhinorrhea.    Eyes: Negative.  Negative for visual disturbance.   Respiratory:  Negative for cough, shortness of breath and wheezing.    Cardiovascular: Negative.  Negative for chest pain, palpitations and leg swelling.   Gastrointestinal: Negative.  Negative for abdominal distention, abdominal pain, constipation, diarrhea, nausea and vomiting.   Endocrine: Negative.    Genitourinary:  Negative for dysuria and urgency.   Musculoskeletal: Negative.  Negative for back pain.   Skin: Negative.  Negative for rash.   Allergic/Immunologic: Negative for immunocompromised state.   Neurological: Negative.  Negative for dizziness, weakness, light-headedness and headaches.   Psychiatric/Behavioral:  Positive for sleep disturbance. Negative for agitation. The patient is nervous/anxious.        Objective   Physical Exam  Constitutional:       General: She is not in acute distress.  HENT:      Head: Normocephalic.      Nose: Nose normal.      Mouth/Throat:      Mouth: Mucous membranes are moist.   Eyes:      Conjunctiva/sclera: Conjunctivae normal.      Pupils: Pupils are equal, round, and reactive to light.   Cardiovascular:      Rate and Rhythm: Normal rate and regular rhythm.      Pulses: Normal pulses.      Heart sounds: Normal heart sounds.   Pulmonary:      Effort: No respiratory distress.      Breath  sounds: No wheezing.   Chest:      Chest wall: No tenderness.   Abdominal:      General: Abdomen is flat. Bowel sounds are normal.      Palpations: Abdomen is soft.      Tenderness: There is no abdominal tenderness.   Musculoskeletal:         General: No tenderness. Normal range of motion.      Cervical back: Normal range of motion.   Lymphadenopathy:      Cervical: No cervical adenopathy.   Skin:     General: Skin is warm and dry.      Findings: No rash.   Neurological:      General: No focal deficit present.      Mental Status: She is alert. Mental status is at baseline.   Psychiatric:         Mood and Affect: Mood normal.         Behavior: Behavior normal.         Assessment/Plan   1. Anxiety  busPIRone (Buspar) 5 mg tablet    In-Center Sleep Study      2. Sleep behavior disorder, REM  In-Center Sleep Study      3. Insomnia, unspecified type  In-Center Sleep Study      4. Bad dreams        5. Periodic limb movement        6. Restless sleeper        7. Hypotensive episode          ADVISED FOR RELAXATION TECHNIQUES AND TO CUT DOWN ON CAFFEINE.  ADVISED TO HAVE LOW FAT AND LOW CALORIE DIET ,DAILY EXERCISE.  ADVISED TO MONITOR BP , IF BECOMES SYMPTOMATIC WITH HYPOTENSION CAN ADD SALT AND WATER.    MDM    1) COMPLEXITY: 1 UNDIAGNOSED NEW PROBLEM WITH UNCERTAIN PROGNOSIS  2)DATA: TESTS INTERPRETED AND OR ORDERED, TOOK INDEPENDENT HISTORY OR RECORDS REVIEWED  3)RISK: MODERATE RISK DUE TO NATURE OF MEDICAL CONDITIONS/COMORBIDITY OR MEDICATIONS ORDERED OR SURGICAL OR PROCEDURE REFERRAL, .       3 weeks

## 2024-04-18 ENCOUNTER — TREATMENT (OUTPATIENT)
Dept: OCCUPATIONAL THERAPY | Facility: CLINIC | Age: 24
End: 2024-04-18
Payer: COMMERCIAL

## 2024-04-18 DIAGNOSIS — M79.644 THUMB PAIN, RIGHT: ICD-10-CM

## 2024-04-18 DIAGNOSIS — S63.681A OTHER SPRAIN OF RIGHT THUMB, INITIAL ENCOUNTER: Primary | ICD-10-CM

## 2024-04-18 PROCEDURE — 97530 THERAPEUTIC ACTIVITIES: CPT | Mod: GO,CO

## 2024-04-18 PROCEDURE — 97110 THERAPEUTIC EXERCISES: CPT | Mod: GO,CO

## 2024-04-18 ASSESSMENT — PAIN SCALES - GENERAL: PAINLEVEL_OUTOF10: 0 - NO PAIN

## 2024-04-18 ASSESSMENT — PAIN - FUNCTIONAL ASSESSMENT: PAIN_FUNCTIONAL_ASSESSMENT: 0-10

## 2024-04-18 NOTE — PROGRESS NOTES
"Occupational Therapy    Occupational Therapy Treatment    Name: Jessi Alvarado  MRN: [id number]  : 2000  Date: 24  Time Calculation  Start Time: 0850  Stop Time: 935  Time Calculation (min): 45 min  Ther Ex-30 mins  Ther Act-10 mins    Assessment: Patient c/o some \"cramping\" in thumb with exercises. Tolerating rep increase well.     Plan: Continue with current POC towards OT goals  OT Plan: Continue to progress  Duration: 6 weeks  Onset Date: 23  Certification Period Start Date: 24  Certification Period End Date: 24  Number of Treatments Authorized: 12  Rehab Potential: Good  Plan of Care Agreement: Patient    Subjective   General: Patient states hand has been feeling good. States she is compliant with exercises. States she is seeing general surgeon for implant in arm on 24. States she will sometimes drop things out of R hand.    General  Reason for Referral: Eval and Treat R Thumb pain  Referred By: Reshma Myers CNP  General Comment: Visit  [place]    Pain Assessment:  Pain Assessment  Pain Assessment: 0-10  Pain Score: 0 - No pain    Objective      Modalities:  Modalities Used: Yes  Modality 1: Untimed Fluidotherapy    Therapy/Activity: Therapeutic Exercise  Therapeutic Exercise Performed: Yes  Therapeutic Exercise Activity 1: Patient interview and AROM while in fluido  Therapeutic Exercise Activity 2: Red flex bar U shape/rainbow shape x 20 w/ 5 second holds each  Therapeutic Exercise Activity 3: R hand red power web thumb flex/ext x 20 w/ 5 second holds each    Therapeutic Activity  Therapeutic Activity Performed: Yes  Therapeutic Activity 1: R tip pinch on black resistive clips placing on/off horizontal bars x 5 clips x 5 trials        Goals:  Active       OT Goals       OT Goal 1 (Progressing)       Start:  24    Expected End:  24       Pt will increase R  by 15# in order to perform her job duties with less pain.         OT Goal 2 " (Progressing)       Start:  03/13/24    Expected End:  04/24/24       Pt will report less pain during and after work to no more than 2/10.         OT Goal 3 (Progressing)       Start:  03/13/24    Expected End:  04/24/24       Pt will be independent with HEP carryover in order to increase strength and function of R dominant hand.         OT Goal 4       Start:  03/13/24    Expected End:  04/24/24       Pt will decrease Quick Dash score by half.

## 2024-04-22 ENCOUNTER — APPOINTMENT (OUTPATIENT)
Dept: OCCUPATIONAL THERAPY | Facility: CLINIC | Age: 24
End: 2024-04-22
Payer: COMMERCIAL

## 2024-04-22 ENCOUNTER — DOCUMENTATION (OUTPATIENT)
Dept: OCCUPATIONAL THERAPY | Facility: CLINIC | Age: 24
End: 2024-04-22
Payer: COMMERCIAL

## 2024-04-22 NOTE — PROGRESS NOTES
Occupational Therapy                 Therapy Communication Note    Patient Name: Jessi Alvarado  MRN: [id number]  Today's Date: 4/22/2024     Discipline: Occupational Therapy    Missed Visit Reason:  Unknown reason    Missed Time: Cancel    Comment: This is patients 3rd cancel this POC

## 2024-04-25 ENCOUNTER — APPOINTMENT (OUTPATIENT)
Dept: OBSTETRICS AND GYNECOLOGY | Facility: CLINIC | Age: 24
End: 2024-04-25
Payer: COMMERCIAL

## 2024-04-25 ENCOUNTER — TREATMENT (OUTPATIENT)
Dept: OCCUPATIONAL THERAPY | Facility: CLINIC | Age: 24
End: 2024-04-25
Payer: COMMERCIAL

## 2024-04-25 DIAGNOSIS — S63.681A OTHER SPRAIN OF RIGHT THUMB, INITIAL ENCOUNTER: Primary | ICD-10-CM

## 2024-04-25 DIAGNOSIS — M79.644 THUMB PAIN, RIGHT: ICD-10-CM

## 2024-04-25 PROCEDURE — 97110 THERAPEUTIC EXERCISES: CPT | Mod: GO,CO

## 2024-04-25 PROCEDURE — 97530 THERAPEUTIC ACTIVITIES: CPT | Mod: GO,CO

## 2024-04-25 ASSESSMENT — PAIN - FUNCTIONAL ASSESSMENT: PAIN_FUNCTIONAL_ASSESSMENT: 0-10

## 2024-04-25 ASSESSMENT — PAIN SCALES - GENERAL: PAINLEVEL_OUTOF10: 0 - NO PAIN

## 2024-04-25 NOTE — PROGRESS NOTES
"Occupational Therapy    Occupational Therapy Treatment    Name: Jessi Alvarado  MRN: [id number]  : 2000  Date: 24  Time Calculation  Start Time: 730  Stop Time: 813  Time Calculation (min): 43 min  Ther Act-33 Mins  Ther Ex- 10 mins    Assessment: Patient starting with 10#s resistance on MRMT board unable to complete d/t fatigue, grading to 5#s resistance, no pain reported throughout session. Extended time for nuts and bolts d/t decreased FMC.    Plan: Continue with current POC towards OT goals  OT Plan: Continue to progress  Duration: 6 weeks  Onset Date: 23  Certification Period Start Date: 24  Certification Period End Date: 24  Number of Treatments Authorized: 12  Rehab Potential: Good  Plan of Care Agreement: Patient    Subjective   General: Patient states hand has been consistently doing well and has not been having much pain. States she will have \"a little pain\" during work with certain tasks however it is much better than it has been.    General  Reason for Referral: Eval and Treat R Thumb pain  Referred By: Reshma Myers CNP  General Comment: Visit  [place]    Pain Assessment:  Pain Assessment  Pain Assessment: 0-10  Pain Score: 0 - No pain    Objective          Therapy/Activity: Therapeutic Exercise  Therapeutic Exercise Performed: Yes  Therapeutic Exercise Activity 2: Red flex bar U shape/rainbow shape x 20 w/ 5 second holds each  Therapeutic Exercise Activity 3: R hand red power web thumb flex/ext x 20 w/ 5 second holds each    Therapeutic Activity  Therapeutic Activity Performed: Yes  Therapeutic Activity 1: R hand on short handle reacher 5#s resistance flipping MRMT pegs x 60 pegs  Therapeutic Activity 2: R hand FMC placing nuts on/off bolts x 10 each        Goals:  Active       OT Goals       OT Goal 1 (Progressing)       Start:  24    Expected End:  24       Pt will increase R  by 15# in order to perform her job duties with less pain.    "      OT Goal 2 (Progressing)       Start:  03/13/24    Expected End:  04/24/24       Pt will report less pain during and after work to no more than 2/10.         OT Goal 3 (Progressing)       Start:  03/13/24    Expected End:  04/24/24       Pt will be independent with HEP carryover in order to increase strength and function of R dominant hand.         OT Goal 4       Start:  03/13/24    Expected End:  04/24/24       Pt will decrease Quick Dash score by half.

## 2024-04-27 ENCOUNTER — OFFICE VISIT (OUTPATIENT)
Dept: URGENT CARE | Facility: CLINIC | Age: 24
End: 2024-04-27
Payer: COMMERCIAL

## 2024-04-27 VITALS
TEMPERATURE: 98.7 F | OXYGEN SATURATION: 99 % | RESPIRATION RATE: 16 BRPM | WEIGHT: 138 LBS | BODY MASS INDEX: 21.66 KG/M2 | DIASTOLIC BLOOD PRESSURE: 67 MMHG | HEIGHT: 67 IN | HEART RATE: 87 BPM | SYSTOLIC BLOOD PRESSURE: 103 MMHG

## 2024-04-27 DIAGNOSIS — J06.9 VIRAL URI WITH COUGH: Primary | ICD-10-CM

## 2024-04-27 DIAGNOSIS — J45.909 ASTHMA, UNSPECIFIED ASTHMA SEVERITY, UNSPECIFIED WHETHER COMPLICATED, UNSPECIFIED WHETHER PERSISTENT (HHS-HCC): ICD-10-CM

## 2024-04-27 PROCEDURE — 99213 OFFICE O/P EST LOW 20 MIN: CPT | Performed by: NURSE PRACTITIONER

## 2024-04-27 RX ORDER — PREDNISONE 20 MG/1
20 TABLET ORAL DAILY
Qty: 3 TABLET | Refills: 0 | Status: SHIPPED | OUTPATIENT
Start: 2024-04-27 | End: 2024-04-30

## 2024-04-27 RX ORDER — BROMPHENIRAMINE MALEATE, PSEUDOEPHEDRINE HYDROCHLORIDE, AND DEXTROMETHORPHAN HYDROBROMIDE 2; 30; 10 MG/5ML; MG/5ML; MG/5ML
5-10 SYRUP ORAL EVERY 6 HOURS PRN
Qty: 400 ML | Refills: 0 | Status: SHIPPED | OUTPATIENT
Start: 2024-04-27

## 2024-04-27 NOTE — PROGRESS NOTES
[facility] URGENT [facility]ARE  Galina Santos, APRN-CNP     Visit Note - 4/27/2024 2:06 PM   This note was generated with voice recognition software and may contain errors including spelling, grammar, syntax, and misrecognization of what was dictated.    Patient: Jessi Alvarado, MRN: [id number], 23 y.o., female   PCP: Lambert Chaudhari MD  ------------------------------------  ALLERGIES:   Allergies   Allergen Reactions    Cinnamon Shortness of breath     cinnamon    Pomegranate Fruit Extract Shortness of breath     pomagranate    Sulfa (Sulfonamide Antibiotics) Other    Sulfamethoxazole-Trimethoprim Hives and Other        CURRENT MEDICATIONS:   Current Outpatient Medications   Medication Instructions    albuterol 90 mcg/actuation inhaler 2 puffs, inhalation, Every 4 hours PRN    albuterol 2.5 mg, inhalation, Every 6 hours PRN    ascorbic acid, vitamin C, 500 mg capsule oral    benzoyl peroxide 5 % lotion 1 Application    brompheniramine-pseudoeph-DM (Bromfed DM) 2-30-10 mg/5 mL syrup 5-10 mL, oral, Every 6 hours PRN, *caution - can cause drowsiness*    busPIRone (BUSPAR) 5 mg, oral, 3 times daily PRN    cholecalciferol (Vitamin D-3) 125 MCG (5000 UT) capsule oral    diclofenac sodium (Voltaren) 1 % gel gel Apply 2 grams 4x/daily to affected area    elderberry fruit and flower 460-115 mg capsule oral    EPINEPHrine 0.3 mg/0.3 mL injection syringe 0.3 MILLIGRAM(S) INTRAMUSCULARLY ONCE A DAY, AS NEEDED FOR SEVERE ALLERGIC REACTION    famotidine (PEPCID) 20 mg, oral, 2 times daily    FreeStyle Xi 3 Sensor device     ibuprofen 600 mg, oral, Every 8 hours PRN    levonorgestreL-ethinyl estrad (Aviane, Alesse, Lessina) 0.1 mg- 20 mcg tablet 1 tablet, oral, Daily    mometasone (Asmanex) 110 mcg/ actuation (30) inhaler 1 puff, inhalation, Daily, Rinse mouth with water after use to reduce aftertaste and incidence of candidiasis. Do not swallow.    neomycin-polymyxin-HC (Cortisporin) otic solution      norelgestromin-ethin.estradioL (Xulane) 150-35 mcg/24 hr transdermal    omeprazole (PRILOSEC) 20 mg, oral, Daily before breakfast, Do not crush or chew.    ondansetron ODT (ZOFRAN-ODT) 4 mg, oral, Every 8 hours PRN    pedi multivit no.17 w-fluoride (Poly-Vi-Cony) 0.5 mg tablet,chewable 1 tablet, oral, Daily    predniSONE (DELTASONE) 20 mg, oral, Daily, Take with a meal.    zinc sulfate (Zincate) 220 (50 Zn) MG capsule oral, Daily     ------------------------------------  PAST MEDICAL HX:  Patient Active Problem List   Diagnosis    Fibroid uterus    Irritable bowel syndrome with mixed bowel habits    ADHD (attention deficit hyperactivity disorder)    Mild intermittent asthma without complication (HHS-HCC)    Acne vulgaris    Left atrial enlargement    MVP (mitral valve prolapse)    Vitamin D deficiency    Anxiety    Thyroid nodule    Other sprain of right thumb, initial encounter    Contusion of right thumb without damage to nail    Mild persistent asthma without complication (HHS-HCC)    Restless sleeper    Thumb pain, right    Periodic limb movement    Bad dreams    Insomnia    Sleep behavior disorder, REM      SURGICAL HX:  Past Surgical History:   Procedure Laterality Date    KNEE SURGERY      WISDOM TOOTH EXTRACTION        FAMILY HX:   No pertinent history.   SOCIAL HX:    reports that she has never smoked. She has never been exposed to tobacco smoke. She has never used smokeless tobacco. Employed - works in a factory.   ------------------------------------  CHIEF COMPLAINT:   Chief Complaint   Patient presents with    URI     Sinus congestion, cough x 5 days       HISTORY OF PRESENT ILLNESS: The history was obtained from patient. Jessi is a 23 y.o. female, who presents with a chief complaint of a harsh cough (sometimes dry, sometimes productive with green/yellow phlegm), nasal congestion (green/yellow mucus), headaches, sweats, and nausea - sxs started on Monday or Tuesday. She reports her throat was irritated  "at onset of sxs but this is improving; reports her ears are very sensitive but feels this is her baseline. She was seen at the ER last night - tested negative for COVID/influenza and was dx'd with a viral URI. Was rx'd benzonatate, but she did not pick it up because it never worked for her in the past. Is here today because she feels her cough is getting a little worse. Has post-tussive nausea/gagging at times, during her coughing fits, and has also noticed a few wheezes when she has the coughing fits - has asthma - albuterol inhaler is helpful. Denies any chills, body aches, abdominal pain, chest pain, shortness of breath, rashes, urinary symptoms, and diarrhea. Denies any lightheadedness or dizziness; no changes in mental status. No swelling in legs. Appetite is decreased ; is able to eat and drink fluids without difficulty; reports sense of taste is intact, but sense of smell is diminished, although this has been her baseline since she had COVID in 2/2024. Has been taking  Sinus Max  without much relief; no other over-the-counter medications or home remedies for symptom management.  Several coworkers have been sick recently; no other known ill contacts. Has received the COVID vaccine x 1; Has not received this season's influenza vaccine. Is a former smoker.  Feels asthma is mildly flared.       REVIEW OF SYSTEMS:  10 systems reviewed negative with exception of history of present illness as listed above.    TODAY'S VITALS: /67 (BP Location: Left arm, Patient Position: Sitting, BP Cuff Size: Small adult)   Pulse 87   Temp 37.1 °C (98.7 °F) (Temporal)   Resp 16   Ht 1.702 m (5' 7\")   Wt 62.6 kg (138 lb)   LMP  (LMP Unknown)   SpO2 99%   BMI 21.61 kg/m²     PHYSICAL EXAMINATION:  General:  Mildly ill-appearing, well nourished female; alert and oriented; in no acute distress. Sitting comfortably on exam table. Non-dyspneic.   Eyes:  Pupils equal, round and reactive to light. No conjunctival erythema; no " scleral icterus.   HENT: No sinus tenderness; + audible nasal congestion. Airway patent, Bilat TMs unremarkable, ear canals clear/unremarkable bilaterally. Nasal mucosa mildly injected and edematous. Oral mucosa moist. Posterior pharynx mildly injected but without vesicles or oropharyngeal exudate aside from PND. Uvula is midline. Managing oral secretions without difficulty.  Neck:  Supple. Mildly tender, mobile anterior cervical lymphadenopathy bilat. Trachea is midline.  Respiratory:  Respirations easy and unlabored, Breath sounds equal. Lungs clear to auscultation; no wheezing, rhonchi, or rales. Has good air movement throughout. Harsh, non-productive cough noted. Non-dyspneic with ambulation; able to maintain SpO2.  Cardiovascular:  Normal rate, Regular rhythm. Normal S1S2. No m/r/g. No peripheral edema.   Gastrointestinal:  Soft, non-tender, non-distended; no palpable masses or organomegaly. Bowel sounds normoactive.  Musculoskeletal:  Grossly normal; appropriate for age.     Integumentary:  Pink, warm, dry, and intact. No rashes or skin discoloration appreciated. Good skin turgor.  Neurologic:  Alert and oriented, no gross deficits.    Cognition and Speech:  Oriented, Speech clear and coherent.    Psychiatric:  Cooperative, Appropriate mood & affect.       ------------------------------------  Medical Decision Making  LABORATORY or RADIOLOGICAL IMAGING ORDERS/RESULTS:   None - pt requesting to defer.     IMPRESSION/PLAN:  Course: Worsening; stable    1. Viral URI with cough  2. Asthma, unspecified asthma severity, unspecified whether complicated, unspecified whether persistent ([facility])  - predniSONE (Deltasone) 20 mg tablet; Take 1 tablet (20 mg) by mouth once daily for 3 days. Take with a meal.  Dispense: 3 tablet; Refill: 0  - brompheniramine-pseudoeph-DM (Bromfed DM) 2-30-10 mg/5 mL syrup; Take 5-10 mL by mouth every 6 hours if needed for congestion or cough. *caution - can cause drowsiness*  Dispense: 400  mL; Refill: 0    No red flags on exam today. No signs of pneumonia or bacterial infection at this point, but encouraged close monitoring and follow up. Symptoms consistent with viral URI with associated symptoms, but reviewed other potential etiologies. Patient declines testing for COVID/influenza/RSV today - reports had negative tests last night at the ER. No indication for antibiotics at this point, but will begin treatment with short course of low-dose prednisone today; will also start cough medication (Bromfed), and should continue albuterol inhaler for PRN use. Instructed to push fluids, rest, and to use appropriate over the counter medications as needed for management of symptoms - plain Mucinex may be helpful. Reviewed instructions for self-isolation and continued monitoring. Reviewed red flags to monitor for, counseled on potential adverse reactions of treatments, expectations for improvement in sxs, and advised to follow-up with primary care provider in 2-3 days if symptoms persist, or to seek care sooner if worsening or if any additional concerns/red flags develop.  Patient agreed with plan of care; questions were encouraged and answered.       LAY Pérez-CNP   Advanced Practice Provider  [facility] URGENT CARE

## 2024-04-29 ENCOUNTER — TREATMENT (OUTPATIENT)
Dept: OCCUPATIONAL THERAPY | Facility: CLINIC | Age: 24
End: 2024-04-29
Payer: COMMERCIAL

## 2024-04-29 DIAGNOSIS — M79.644 THUMB PAIN, RIGHT: ICD-10-CM

## 2024-04-29 DIAGNOSIS — S63.681A OTHER SPRAIN OF RIGHT THUMB, INITIAL ENCOUNTER: ICD-10-CM

## 2024-04-29 PROCEDURE — 97530 THERAPEUTIC ACTIVITIES: CPT | Mod: GO,CO

## 2024-04-29 PROCEDURE — 97110 THERAPEUTIC EXERCISES: CPT | Mod: GO,CO

## 2024-04-29 ASSESSMENT — PAIN SCALES - GENERAL: PAINLEVEL_OUTOF10: 0 - NO PAIN

## 2024-04-29 ASSESSMENT — PAIN - FUNCTIONAL ASSESSMENT: PAIN_FUNCTIONAL_ASSESSMENT: 0-10

## 2024-04-29 NOTE — PROGRESS NOTES
Occupational Therapy    Occupational Therapy Treatment    Name: Jessi Alvarado  MRN: [id number]  : 2000  Date: 24  Time Calculation  Start Time: 0910  Stop Time: 0950  Time Calculation (min): 40 min  Ther Act-20 mins  Ther Ex-20 mins    Assessment: Patient c/o fatigue in R wrist with exercises, no pain in thumb reported. Tolerating resistance increase well.     Plan: Continue with current POC towards OT goals  OT Plan: Continue to progress  Duration: 6 weeks  Onset Date: 23  Certification Period Start Date: 24  Certification Period End Date: 24  Number of Treatments Authorized: 12  Rehab Potential: Good  Plan of Care Agreement: Patient    Subjective   General: Patient states her thumb has been doing very well that she has not had any pain. States she has been sick this past week and has not done a ton. Patient states she hasn't dropped anything in over week.     General  Reason for Referral: Eval and Treat R Thumb pain  Referred By: Reshma Myers CNP  General Comment: Visit  [place]    Pain Assessment:  Pain Assessment  Pain Assessment: 0-10  Pain Score: 0 - No pain    Objective      Modalities:  Modalities Used: Yes    Therapy/Activity: Therapeutic Exercise  Therapeutic Exercise Performed: Yes  Therapeutic Exercise Activity 2: Green flex bar U shape/rainbow shape x 20 w/ 5 second holds each  Therapeutic Exercise Activity 3: R hand green power web thumb flex/ext x 20 w/ 5 second holds each  Therapeutic Exercise Activity 4: increased T foam exercises to Green foam block for HEP    Therapeutic Activity  Therapeutic Activity Performed: Yes  Therapeutic Activity 1: R hand tip pinch on black resistive clips to place on/take off horizontal bar x 10 trials x 5 clips  Therapeutic Activity 2: R hand lat pinch pinch on black resistive clips to place on/take off vertical bar x 10 trials x 5 clips        Goals:  Active       OT Goals       OT Goal 1 (Progressing)       Start:   03/13/24    Expected End:  04/24/24       Pt will increase R  by 15# in order to perform her job duties with less pain.         OT Goal 2 (Progressing)       Start:  03/13/24    Expected End:  04/24/24       Pt will report less pain during and after work to no more than 2/10.         OT Goal 3 (Progressing)       Start:  03/13/24    Expected End:  04/24/24       Pt will be independent with HEP carryover in order to increase strength and function of R dominant hand.         OT Goal 4       Start:  03/13/24    Expected End:  04/24/24       Pt will decrease Quick Dash score by half.

## 2024-04-30 ENCOUNTER — DOCUMENTATION (OUTPATIENT)
Dept: SURGERY | Facility: CLINIC | Age: 24
End: 2024-04-30

## 2024-04-30 ENCOUNTER — OFFICE VISIT (OUTPATIENT)
Dept: SURGERY | Facility: CLINIC | Age: 24
End: 2024-04-30
Payer: COMMERCIAL

## 2024-04-30 VITALS
BODY MASS INDEX: 21.88 KG/M2 | DIASTOLIC BLOOD PRESSURE: 72 MMHG | SYSTOLIC BLOOD PRESSURE: 110 MMHG | HEART RATE: 85 BPM | HEIGHT: 67 IN | WEIGHT: 139.4 LBS

## 2024-04-30 DIAGNOSIS — S40.851A FOREIGN BODY IN RIGHT UPPER EXTREMITY, INITIAL ENCOUNTER: ICD-10-CM

## 2024-04-30 DIAGNOSIS — Z30.46 ENCOUNTER FOR NEXPLANON REMOVAL: Primary | ICD-10-CM

## 2024-04-30 PROCEDURE — 99214 OFFICE O/P EST MOD 30 MIN: CPT | Performed by: SURGERY

## 2024-04-30 PROCEDURE — 1036F TOBACCO NON-USER: CPT | Performed by: SURGERY

## 2024-04-30 RX ORDER — SODIUM CHLORIDE, SODIUM LACTATE, POTASSIUM CHLORIDE, CALCIUM CHLORIDE 600; 310; 30; 20 MG/100ML; MG/100ML; MG/100ML; MG/100ML
100 INJECTION, SOLUTION INTRAVENOUS CONTINUOUS
OUTPATIENT
Start: 2024-04-30

## 2024-04-30 RX ORDER — CEFAZOLIN SODIUM 2 G/100ML
2 INJECTION, SOLUTION INTRAVENOUS ONCE
OUTPATIENT
Start: 2024-04-30 | End: 2024-04-30

## 2024-04-30 NOTE — PROGRESS NOTES
General Surgery Consultation    Patient: Jessi Alvarado  : 2000  MRN: [id number]  Date of Consultation: 24    Primary Care Provider: Lambert Chaudhari MD  Referring Provider: Edouard Crisostomo MD    Chief Complaint: Nexplanon failed excision x 2 referred for removal in OR  History of Present Illness: Jessi Alvarado is a 23 y.o. old female seen at the request of Edouard Crisostomo MD for evaluation of removal of Nexplanon and in the OR with fluoroscopy.  The patient had this placed and underwent attempted excision by the nurse practitioner and her gynecologist without success.  They feel it is probably too deep and asked that this be done in the OR..  The patient states that she gained 30 pounds since the initial insertion    Medical History:  Anxiety/depression    Surgical History:  Past Surgical History:   Procedure Laterality Date    KNEE SURGERY      WISDOM TOOTH EXTRACTION         Home Medications:  Prior to Admission medications    Medication Sig Start Date End Date Taking? Authorizing Provider   albuterol 2.5 mg /3 mL (0.083 %) nebulizer solution Inhale 3 mL (2.5 mg) every 6 hours if needed for wheezing.   Yes Historical Provider, MD   albuterol 90 mcg/actuation inhaler Inhale 2 puffs every 4 hours if needed for wheezing. 2/15/24  Yes Lambert Chaudhari MD   ascorbic acid, vitamin C, 500 mg capsule Take by mouth.   Yes Historical Provider, MD   benzoyl peroxide 5 % lotion 1 Application 18  Yes Historical Provider, MD   brompheniramine-pseudoeph-DM (Bromfed DM) 2-30-10 mg/5 mL syrup Take 5-10 mL by mouth every 6 hours if needed for congestion or cough. *caution - can cause drowsiness* 24  Yes Galina Santos APRN-CNP   busPIRone (Buspar) 5 mg tablet Take 1 tablet (5 mg) by mouth 3 times a day as needed (anxiety). 24 Yes Emi Cox MD   cholecalciferol (Vitamin D-3) 125 MCG (5000 UT) capsule Take by mouth.   Yes Historical Provider, MD   diclofenac sodium  (Voltaren) 1 % gel gel Apply 2 grams 4x/daily to affected area 1/19/24  Yes ROMY Recinos   elderberry fruit and flower 460-115 mg capsule Take by mouth.   Yes Historical Provider, MD   EPINEPHrine 0.3 mg/0.3 mL injection syringe 0.3 MILLIGRAM(S) INTRAMUSCULARLY ONCE A DAY, AS NEEDED FOR SEVERE ALLERGIC REACTION 5/31/23  Yes Lambert Chaudhari MD   ibuprofen 600 mg tablet Take 1 tablet (600 mg) by mouth every 8 hours if needed for mild pain (1 - 3) or fever (temp greater than 38.0 C). 12/19/23  Yes ROMY Gomez   levonorgestreL-ethinyl estrad (Aviane, Alesse, Lessina) 0.1 mg- 20 mcg tablet Chew 1 tablet once daily. 1/18/24 1/17/25 Yes WALTER Mckenna APRN-CNP, DNP   neomycin-polymyxin-HC (Cortisporin) otic solution  8/12/23  Yes Historical Provider, MD   norelgestromin-ethin.estradioL (Xulane) 150-35 mcg/24 hr Place on the skin. 10/18/22  Yes Historical Provider, MD   ondansetron ODT (Zofran-ODT) 4 mg disintegrating tablet Take 1 tablet (4 mg) by mouth every 8 hours if needed for nausea or vomiting for up to 20 doses. 1/3/24  Yes Rodrigue Washington MD   pedi multivit no.17 w-fluoride (Poly-Vi-Cony) 0.5 mg tablet,chewable Chew 1 tablet once daily.   Yes Historical Provider, MD   predniSONE (Deltasone) 20 mg tablet Take 1 tablet (20 mg) by mouth once daily for 3 days. Take with a meal. 4/27/24 4/30/24 Yes ROMY Pérez   zinc sulfate (Zincate) 220 (50 Zn) MG capsule Take by mouth once daily.   Yes Historical Provider, MD   FreeStyle Xi 3 Sensor device  10/9/23 4/30/24 Yes Historical Provider, MD   famotidine (Pepcid) 20 mg tablet Take 1 tablet (20 mg) by mouth 2 times a day. 9/26/23 3/24/24  Emi Cox MD   mometasone (Asmanex) 110 mcg/ actuation (30) inhaler Inhale 1 puff once daily. Rinse mouth with water after use to reduce aftertaste and incidence of candidiasis. Do not swallow. 2/27/24 3/28/24  Lambert Chaudhari MD   omeprazole (PriLOSEC) 20 mg DR capsule Take 1  capsule (20 mg) by mouth once daily in the morning. Take before meals. Do not crush or chew. 1/30/24 4/29/24  Lambert Chaudhari MD       Allergies:  Allergies   Allergen Reactions    Cinnamon Shortness of breath     cinnamon    Pomegranate Fruit Extract Shortness of breath     pomagranate    Sulfa (Sulfonamide Antibiotics) Other    Sulfamethoxazole-Trimethoprim Hives and Other       Family History:   Family History   Problem Relation Name Age of Onset    No Known Problems Mother      ADD / ADHD Father      Cancer Maternal Grandmother      Hypertension Maternal Grandfather      IRMA disease Paternal Grandmother      Irritable bowel syndrome Paternal Grandmother      Hypertension Paternal Grandfather         Social History:  Social History     Socioeconomic History    Marital status: Single     Spouse name: None    Number of children: None    Years of education: None    Highest education level: None   Occupational History    None   Tobacco Use    Smoking status: Never     Passive exposure: Never    Smokeless tobacco: Never   Vaping Use    Vaping status: Former    Quit date: 1/1/2024    Substances: Nicotine    Devices: Refillable tank   Substance and Sexual Activity    Alcohol use: Not Currently    Drug use: Never    Sexual activity: Yes     Birth control/protection: Implant   Other Topics Concern    None   Social History Narrative    None     Social Determinants of Health     Financial Resource Strain: Low Risk  (10/9/2023)    Received from [facility]    Overall Financial Resource Strain (CARDIA)     Difficulty of Paying Living Expenses: Not hard at all   Food Insecurity: No Food Insecurity (10/9/2023)    Received from [facility]    Hunger Vital Sign     Worried About Running Out of Food in the Last Year: Never true     Ran Out of Food in the Last Year: Never true   Transportation Needs: No Transportation Needs (10/9/2023)    Received from [facility]    PRAPARE - Transportation     Lack of Transportation (Medical):  "No     Lack of Transportation (Non-Medical): No   Physical Activity: Not on file   Stress: Not on file   Social Connections: Unknown (10/9/2023)    Received from [facility]    Social Connection and Isolation Panel [NHANES]     Frequency of Communication with Friends and Family: Not on file     Frequency of Social Gatherings with Friends and Family: Once a week     Attends Adventism Services: Not on file     Active Member of Clubs or Organizations: Not on file     Attends Club or Organization Meetings: Not on file     Marital Status: Not on file   Intimate Partner Violence: Not on file   Housing Stability: Unknown (10/9/2023)    Received from [facility]    Housing Stability Vital Sign     Unable to Pay for Housing in the Last Year: No     Number of Places Lived in the Last Year: Not on file     Unstable Housing in the Last Year: No   Patient vapes but does not smoke.  She states she does not drink.    ROS:  Constitutional:  no fever, sweats, and chills  Cardiovascular: No chest pain  Respiratory: No cough or shortness of breath  Gastrointestinal: Denies  Genitourinary: no dysuria  Musculoskeletal: no weakness or swelling  Integumentary: no rashes  Neurological: no confusion  Endocrine: no heat or cold intolerance  Heme/Lymph: no easy bruising or bleeding    Objective:  /72   Pulse 85   Ht 1.702 m (5' 7\")   Wt 63.2 kg (139 lb 6.4 oz)   LMP  (LMP Unknown)   BMI 21.83 kg/m²     Physical Exam:  Constitutional: No acute distress, conversant, pleasant  Neurologic: alert and oriented  Psych: appropriate affect  Ears, Nose, Mouth and Throat: mucus membranes moist  Pulmonary: No labored breathing  Cardiovascular: Regular rate and rhythm  Abdomen: soft, non-distended, non-tender  Musculoskeletal: Moves all extremities, no edema  Skin: warm and dry  Inner upper right arm with tattoo which has Sejal.  At the base of this she states that is where the implant is.  I think you can get an impression of it.    Assessment " and Plan: Jessi Alvarado is a 23 y.o. old female with birth control implant which needs to be removed in the operating room due to inability to find under local exploration.  She understands that I have never put 1 of these in or remove them but that we do remove foreign body such as needles and she is in agreement to proceed.  We will use the C arm to help localize it and she understands we may put needles in her arm to assist in this regard.  We will do this under monitored anesthesia care.  We discussed the risk of bleeding infection reactions to any of the medicines and the possibility of not retrieving the implant.  Patient and her mother had no further questions and signed the consent..     Idania Holloway MD  4/30/2024

## 2024-04-30 NOTE — PROGRESS NOTES
Notified patient of right arm Nexplanon removal  scheduled on 5-16-24  .Instructions reviewed. Advised patient [facility] will contact them 24 hours before surgery with arrival time. Pt voices understanding. Aracelis Ferreira MA.    Surgery got postponed to 5-30-24 due to pt having Bronchitis.    Surgery was r/s from 5-30-24 to 6-20-24 due to MCM injury.

## 2024-05-01 ENCOUNTER — TREATMENT (OUTPATIENT)
Dept: OCCUPATIONAL THERAPY | Facility: CLINIC | Age: 24
End: 2024-05-01
Payer: COMMERCIAL

## 2024-05-01 ENCOUNTER — DOCUMENTATION (OUTPATIENT)
Dept: OCCUPATIONAL THERAPY | Facility: CLINIC | Age: 24
End: 2024-05-01
Payer: COMMERCIAL

## 2024-05-01 ENCOUNTER — HOSPITAL ENCOUNTER (OUTPATIENT)
Dept: RADIOLOGY | Facility: HOSPITAL | Age: 24
Discharge: HOME | End: 2024-05-01
Payer: COMMERCIAL

## 2024-05-01 DIAGNOSIS — S63.681A OTHER SPRAIN OF RIGHT THUMB, INITIAL ENCOUNTER: Primary | ICD-10-CM

## 2024-05-01 DIAGNOSIS — Z30.46 ENCOUNTER FOR NEXPLANON REMOVAL: ICD-10-CM

## 2024-05-01 PROCEDURE — 73060 X-RAY EXAM OF HUMERUS: CPT | Mod: RIGHT SIDE | Performed by: RADIOLOGY

## 2024-05-01 PROCEDURE — 73060 X-RAY EXAM OF HUMERUS: CPT | Mod: RT

## 2024-05-01 NOTE — PROGRESS NOTES
Occupational Therapy    Occupational Therapy Treatment    Name: Jessi Alvarado  MRN: [id number]  : 2000  Date: 24       Assessment:     Plan:       Subjective   General:        Precautions:     Pain Assessment:       Objective    Cognition:     Coordination:     Activities of Daily Living: {ADLS:11339}  Functional Standing Tolerance:     Bed Mobility/Transfers: {Mobility/Transfers:58993}  Modalities:     IADL's: {IADL's:07784}  Communication:     Splinting:     Casting:     Therapy/Activity: {Therapy/Activity:94065}  Sensory:     Cognitive Skill Development:     Community/Work Reintegration Training:     Community Mobility:     Vision:     Strength:     Other Activity:     Home environment:     {OT Assessments:24351}  {Spine,UE,LE,HAND,LYMPHEDEMA:35656}    Outcome Measures:  {OT Outcome Measures:26261}    OP EDUCATION:       Goals:  Active       OT Goals       OT Goal 1 (Progressing)       Start:  24    Expected End:  24       Pt will increase R  by 15# in order to perform her job duties with less pain.         OT Goal 2 (Progressing)       Start:  24    Expected End:  24       Pt will report less pain during and after work to no more than 2/10.         OT Goal 3 (Progressing)       Start:  24    Expected End:  24       Pt will be independent with HEP carryover in order to increase strength and function of R dominant hand.         OT Goal 4       Start:  24    Expected End:  24       Pt will decrease Quick Dash score by half.

## 2024-05-01 NOTE — PROGRESS NOTES
Occupational Therapy                 Therapy Communication Note    Patient Name: Jessi Alvarado  MRN: [id number]  Today's Date: 5/1/2024     Discipline: Occupational Therapy    Missed Visit Reason:   No Auth    Missed Time: Cancel    Comment: Patient arriving for apt,  stating after start of apt that patient does not have auth for workers comp and they have attempted to call, patient was also seen Monday 4/29/24.

## 2024-05-02 ENCOUNTER — APPOINTMENT (OUTPATIENT)
Dept: SURGERY | Facility: CLINIC | Age: 24
End: 2024-05-02
Payer: COMMERCIAL

## 2024-05-06 ENCOUNTER — APPOINTMENT (OUTPATIENT)
Dept: OCCUPATIONAL THERAPY | Facility: CLINIC | Age: 24
End: 2024-05-06
Payer: COMMERCIAL

## 2024-05-07 ENCOUNTER — OFFICE VISIT (OUTPATIENT)
Dept: URGENT CARE | Facility: CLINIC | Age: 24
End: 2024-05-07
Payer: COMMERCIAL

## 2024-05-07 VITALS
OXYGEN SATURATION: 97 % | HEIGHT: 67 IN | WEIGHT: 139 LBS | SYSTOLIC BLOOD PRESSURE: 108 MMHG | BODY MASS INDEX: 21.82 KG/M2 | DIASTOLIC BLOOD PRESSURE: 73 MMHG | HEART RATE: 96 BPM | TEMPERATURE: 97.9 F | RESPIRATION RATE: 18 BRPM

## 2024-05-07 DIAGNOSIS — J20.9 ACUTE BRONCHITIS, UNSPECIFIED ORGANISM: Primary | ICD-10-CM

## 2024-05-07 PROCEDURE — 99213 OFFICE O/P EST LOW 20 MIN: CPT | Performed by: NURSE PRACTITIONER

## 2024-05-07 RX ORDER — PREDNISONE 10 MG/1
TABLET ORAL DAILY
Qty: 21 TABLET | Refills: 0 | Status: SHIPPED | OUTPATIENT
Start: 2024-05-07 | End: 2024-05-12

## 2024-05-07 RX ORDER — AZITHROMYCIN 250 MG/1
TABLET, FILM COATED ORAL
Qty: 6 TABLET | Refills: 0 | Status: SHIPPED | OUTPATIENT
Start: 2024-05-07 | End: 2024-05-07 | Stop reason: HOSPADM

## 2024-05-07 RX ORDER — AZITHROMYCIN 250 MG/1
TABLET, FILM COATED ORAL
Qty: 6 TABLET | Refills: 0 | Status: SHIPPED | OUTPATIENT
Start: 2024-05-07 | End: 2024-05-12

## 2024-05-07 RX ORDER — PREDNISONE 10 MG/1
TABLET ORAL DAILY
Qty: 21 TABLET | Refills: 0 | Status: SHIPPED | OUTPATIENT
Start: 2024-05-07 | End: 2024-05-07 | Stop reason: HOSPADM

## 2024-05-07 RX ORDER — ALBUTEROL SULFATE 90 UG/1
2 AEROSOL, METERED RESPIRATORY (INHALATION) EVERY 6 HOURS PRN
Qty: 18 G | Refills: 0 | Status: SHIPPED | OUTPATIENT
Start: 2024-05-07 | End: 2024-05-07 | Stop reason: HOSPADM

## 2024-05-07 NOTE — PROGRESS NOTES
23 y.o. female presents for evaluation of URI.  Symptoms including cough, congestion, body aches, malaise, and headache have been present for 2 weeks and refractory to OTC meds.  No fever, chills, nausea, vomiting, abdominal pain, CP, or SOB.  No exacerbating factors. No known COVID 19/flu exposure.    Vitals:    05/07/24 0937   BP: 108/73   Pulse: 96   Resp: 18   Temp: 36.6 °C (97.9 °F)   SpO2: 97%       Allergies   Allergen Reactions    Cinnamon Shortness of breath     cinnamon    Pomegranate Fruit Extract Shortness of breath     pomagranate    Sulfa (Sulfonamide Antibiotics) Other    Sulfamethoxazole-Trimethoprim Hives and Other       Medication Documentation Review Audit       Reviewed by Cecily Luo MA (Medical Assistant) on 05/07/24 at 0937      Medication Order Taking? Sig Documenting Provider Last Dose Status   albuterol 2.5 mg /3 mL (0.083 %) nebulizer solution [id number] Yes Inhale 3 mL (2.5 mg) every 6 hours if needed for wheezing. Historical Provider, MD Taking Active   albuterol 90 mcg/actuation inhaler [id number] Yes Inhale 2 puffs every 4 hours if needed for wheezing. Lambert Chaudhari MD Taking Active   ascorbic acid, vitamin C, 500 mg capsule [id number] Yes Take by mouth. Historical Provider, MD Taking Active   benzoyl peroxide 5 % lotion [id number] Yes 1 Application Historical Provider, MD Taking Active   brompheniramine-pseudoeph-DM (Bromfed DM) 2-30-10 mg/5 mL syrup [id number] No Take 5-10 mL by mouth every 6 hours if needed for congestion or cough. *caution - can cause drowsiness*   Patient not taking: Reported on 5/7/2024    Galina Santos, APRN-CNP Not Taking Active   busPIRone (Buspar) 5 mg tablet [id number] Yes Take 1 tablet (5 mg) by mouth 3 times a day as needed (anxiety). Emi Cox MD Taking Active   cholecalciferol (Vitamin D-3) 125 MCG (5000 UT) capsule [id number] Yes Take by mouth. Historical Provider, MD Taking Active   diclofenac sodium (Voltaren) 1 % gel gel [id number] Yes Apply  2 grams 4x/daily to affected area Reshma Myers, APRN-CNP Taking Active   elderberry fruit and flower 460-115 mg capsule [id number] Yes Take by mouth. Historical Provider, MD Taking Active   EPINEPHrine 0.3 mg/0.3 mL injection syringe [id number] Yes 0.3 MILLIGRAM(S) INTRAMUSCULARLY ONCE A DAY, AS NEEDED FOR SEVERE ALLERGIC REACTION Lambert Chaudhari MD Taking Active   famotidine (Pepcid) 20 mg tablet [id number]  Take 1 tablet (20 mg) by mouth 2 times a day. Emi Cox MD   24 235   ibuprofen 600 mg tablet [id number] Yes Take 1 tablet (600 mg) by mouth every 8 hours if needed for mild pain (1 - 3) or fever (temp greater than 38.0 C). Rubén Carr APRN-CNP Taking Active   levonorgestreL-ethinyl estrad (Aviane, Alesse, Lessina) 0.1 mg- 20 mcg tablet [id number] Yes Chew 1 tablet once daily. Yoly Logan APRN-CNM, APRN-CNP, DNP Taking Active   mometasone (Asmanex) 110 mcg/ actuation (30) inhaler [id number]  Inhale 1 puff once daily. Rinse mouth with water after use to reduce aftertaste and incidence of candidiasis. Do not swallow. Lambert Chaudhari MD   24 2359   neomycin-polymyxin-HC (Cortisporin) otic solution [id number] Yes  Historical Provider, MD Taking Active   norelgestromin-ethin.estradioL (Xulane) 150-35 mcg/24 hr [id number] Yes Place on the skin. Historical Provider, MD Taking Active   omeprazole (PriLOSEC) 20 mg DR capsule [id number]  Take 1 capsule (20 mg) by mouth once daily in the morning. Take before meals. Do not crush or chew. Lambert Chaudhari MD   24 235   ondansetron ODT (Zofran-ODT) 4 mg disintegrating tablet [id number] Yes Take 1 tablet (4 mg) by mouth every 8 hours if needed for nausea or vomiting for up to 20 doses. Rodrigue Washington MD Taking Active   pedi multivit no.17 w-fluoride (Poly-Vi-Cony) 0.5 mg tablet,chewable [id number] Yes Chew 1 tablet once daily. Historical Provider, MD Taking Active   predniSONE (Deltasone) 20 mg tablet [id number]  Take 1  tablet (20 mg) by mouth once daily for 3 days. Take with a meal. Galina Santos, APRN-CNP   24 6658   zinc sulfate (Zincate) 220 (50 Zn) MG capsule [id number] Yes Take by mouth once daily. Historical Provider, MD Taking Active                    Past Medical History:   Diagnosis Date    Anxiety and depression     Asthma attack ([facility])     Attention-deficit hyperactivity disorder, predominantly hyperactive type     Attention deficit hyperactivity disorder (ADHD), predominantly hyperactive type    Concussion with no loss of consciousness 2018    Contusion of left hand 2023    Groin abscess 2023    Hand sprain, left, sequela 2023    Hematuria 2023    Left wrist pain 2023    Left wrist sprain, sequela 2023    Lump of skin 2023    Numbness of fingers 2023    Other conditions influencing health status 2020    Menarche    Personal history of other diseases of the female genital tract     History of ovarian cyst    Personal history of other diseases of the respiratory system     History of asthma    Post-op pain 2023    Right knee pain 2023    Stiffness of right knee, not elsewhere classified 2023       Past Surgical History:   Procedure Laterality Date    KNEE SURGERY      WISDOM TOOTH EXTRACTION         ROS  See HPI    Physical Exam  Vitals and nursing note reviewed.   Constitutional:       General: She is not in acute distress.     Appearance: Normal appearance. She is normal weight. She is not ill-appearing or toxic-appearing.   HENT:      Head: Normocephalic and atraumatic.      Right Ear: Tympanic membrane and ear canal normal.      Left Ear: Tympanic membrane and ear canal normal.      Nose: Nose normal. No congestion or rhinorrhea.      Mouth/Throat:      Mouth: Mucous membranes are moist.      Pharynx: Oropharynx is clear.   Eyes:      Extraocular Movements: Extraocular movements intact.      Conjunctiva/sclera: Conjunctivae  normal.      Pupils: Pupils are equal, round, and reactive to light.   Cardiovascular:      Rate and Rhythm: Normal rate and regular rhythm.   Pulmonary:      Effort: Pulmonary effort is normal.      Breath sounds: Normal breath sounds.      Comments: Intermittent dry cough  Lymphadenopathy:      Cervical: No cervical adenopathy.   Skin:     General: Skin is warm and dry.      Capillary Refill: Capillary refill takes less than 2 seconds.   Neurological:      General: No focal deficit present.      Mental Status: She is alert and oriented to person, place, and time.   Psychiatric:         Mood and Affect: Mood normal.         Behavior: Behavior normal.           Assessment/Plan/MDM  Jessi was seen today for cough.  Diagnoses and all orders for this visit:  Acute bronchitis, unspecified organism (Primary)  -     Discontinue: azithromycin (Zithromax Z-Robert) 250 mg tablet; Take 2 tablets (500 mg) on  Day 1,  followed by 1 tablet (250 mg) once daily on Days 2 through 5.  -     Discontinue: predniSONE (Deltasone) 10 mg tablet; Take 6 tablets (60 mg) by mouth once daily for 1 day, THEN 5 tablets (50 mg) once daily for 1 day, THEN 4 tablets (40 mg) once daily for 1 day, THEN 3 tablets (30 mg) once daily for 1 day, THEN 2 tablets (20 mg) once daily for 1 day, THEN 1 tablet (10 mg) once daily for 1 day.  -     Discontinue: albuterol 90 mcg/actuation inhaler; Inhale 2 puffs every 6 hours if needed for wheezing.  -     azithromycin (Zithromax Z-Robert) 250 mg tablet; Take 2 tablets (500 mg) on  Day 1,  followed by 1 tablet (250 mg) once daily on Days 2 through 5.  -     predniSONE (Deltasone) 10 mg tablet; Take 6 tablets (60 mg) by mouth once daily for 1 day, THEN 5 tablets (50 mg) once daily for 1 day, THEN 4 tablets (40 mg) once daily for 1 day, THEN 3 tablets (30 mg) once daily for 1 day, THEN 2 tablets (20 mg) once daily for 1 day, THEN 1 tablet (10 mg) once daily for 1 day.    Encouraged pt to continue otc cold remedies PRN,  push by mouth fluids and rest. Patient's clinical presentation is otherwise unremarkable at this time. Patient is discharged with instructions to follow-up with primary care or seek emergency medical attention for worsening symptoms or any new concerns.    I did personally review Jessi's past medical history, surgical history, social history, as well as family history (when relevant).  In this case, I also oversaw the her drug management by reviewing her medication list, allergy list, as well as the medications that I prescribed during the UC course and/or recommended as an out-patient (including possible OTC medications such as acetaminophen, NSAIDs , etc).    After reviewing the items above, I did not look at previous medical documentation, such as recent hospitalizations, office visits, and/or recent consultations with PCP/specialist.                          SDOH:   Another factor that I considered in Jessi's care was her Social Determinants of Health (SDOH). During this UC encounter, she did not have social determinants of health. Those SDOH influencing Jessi's care are: none      Frantz Onofre CNP  [facility]  [phone]

## 2024-05-13 NOTE — PREPROCEDURE INSTRUCTIONS
No outpatient medications have been marked as taking for the 5/16/24 encounter (Hospital Encounter).                       NPO Instructions:    Nothing to eat or drink after midnight    Additional Instructions:     Will need  home, will receive call day before surgery with arrival time

## 2024-05-22 ENCOUNTER — OFFICE VISIT (OUTPATIENT)
Dept: PRIMARY CARE | Facility: CLINIC | Age: 24
End: 2024-05-22
Payer: COMMERCIAL

## 2024-05-22 ENCOUNTER — LAB (OUTPATIENT)
Dept: LAB | Facility: LAB | Age: 24
End: 2024-05-22
Payer: COMMERCIAL

## 2024-05-22 VITALS
BODY MASS INDEX: 21.19 KG/M2 | WEIGHT: 135 LBS | HEIGHT: 67 IN | DIASTOLIC BLOOD PRESSURE: 67 MMHG | SYSTOLIC BLOOD PRESSURE: 104 MMHG | HEART RATE: 90 BPM

## 2024-05-22 DIAGNOSIS — E55.9 VITAMIN D DEFICIENCY: ICD-10-CM

## 2024-05-22 DIAGNOSIS — R63.2 EXCESSIVE HUNGER: ICD-10-CM

## 2024-05-22 DIAGNOSIS — R61 EXCESSIVE SWEATING: ICD-10-CM

## 2024-05-22 DIAGNOSIS — R61 HYPERHIDROSIS: ICD-10-CM

## 2024-05-22 DIAGNOSIS — J45.30 MILD PERSISTENT ASTHMA WITHOUT COMPLICATION (HHS-HCC): ICD-10-CM

## 2024-05-22 DIAGNOSIS — L70.9 ACNE, UNSPECIFIED ACNE TYPE: Primary | ICD-10-CM

## 2024-05-22 LAB
25(OH)D3 SERPL-MCNC: 19 NG/ML (ref 30–100)
TSH SERPL-ACNC: 1.39 MIU/L (ref 0.44–3.98)

## 2024-05-22 PROCEDURE — 84443 ASSAY THYROID STIM HORMONE: CPT

## 2024-05-22 PROCEDURE — 99214 OFFICE O/P EST MOD 30 MIN: CPT | Performed by: INTERNAL MEDICINE

## 2024-05-22 PROCEDURE — 1036F TOBACCO NON-USER: CPT | Performed by: INTERNAL MEDICINE

## 2024-05-22 PROCEDURE — 36415 COLL VENOUS BLD VENIPUNCTURE: CPT

## 2024-05-22 PROCEDURE — 82306 VITAMIN D 25 HYDROXY: CPT

## 2024-05-22 RX ORDER — DOXYCYCLINE 100 MG/1
100 CAPSULE ORAL DAILY
Qty: 30 CAPSULE | Refills: 5 | Status: SHIPPED | OUTPATIENT
Start: 2024-05-22 | End: 2024-05-22

## 2024-05-22 RX ORDER — DOXYCYCLINE 100 MG/1
100 CAPSULE ORAL 2 TIMES DAILY
Qty: 14 CAPSULE | Refills: 0 | Status: SHIPPED | OUTPATIENT
Start: 2024-05-22 | End: 2024-05-22

## 2024-05-22 RX ORDER — DOXYCYCLINE 100 MG/1
100 CAPSULE ORAL 2 TIMES DAILY
Qty: 14 CAPSULE | Refills: 0 | Status: SHIPPED | OUTPATIENT
Start: 2024-05-22 | End: 2024-05-29

## 2024-05-22 RX ORDER — DOXYCYCLINE 100 MG/1
100 CAPSULE ORAL DAILY
Qty: 30 CAPSULE | Refills: 5 | Status: SHIPPED | OUTPATIENT
Start: 2024-05-22

## 2024-05-22 ASSESSMENT — ENCOUNTER SYMPTOMS
DIZZINESS: 0
NAUSEA: 0
NEUROLOGICAL NEGATIVE: 1
MUSCULOSKELETAL NEGATIVE: 1
LIGHT-HEADEDNESS: 0
ROS SKIN COMMENTS: PER HPI.
WHEEZING: 0
GASTROINTESTINAL NEGATIVE: 1
CONSTIPATION: 0
ENDOCRINE NEGATIVE: 1
AGITATION: 0
ABDOMINAL PAIN: 0
COUGH: 0
WEAKNESS: 0
VOMITING: 0
CHILLS: 0
PALPITATIONS: 0
PSYCHIATRIC NEGATIVE: 1
EYES NEGATIVE: 1
SHORTNESS OF BREATH: 0
CARDIOVASCULAR NEGATIVE: 1
ABDOMINAL DISTENTION: 0
FEVER: 0
DIARRHEA: 0
HEADACHES: 0
RHINORRHEA: 0
DYSURIA: 0
BACK PAIN: 0

## 2024-05-22 NOTE — PROGRESS NOTES
Subjective   Patient ID: Jessi Alvarado is a 23 y.o. female who presents for Follow-up (C/O RED ITCHY SPOTS ON B/L UPPER ARMS AND BACK. C/O FEELING HUNGRY ALL THE TIME AND EXCESSIVE SWEATING WHICH MAKES IT DIFFICULT TO CONTROL BODY ODOR. ).  HPI  MED REFILL. ITCHY ACNE OF FACE AND SHOULDERS . EXCESSIVE HUNGER ,BUT HAD WEIGHT LOSS .HYPERHIDROSIS   Review of Systems   Constitutional:  Negative for chills and fever.        WEIGHT LOSS   HENT: Negative.  Negative for congestion, postnasal drip and rhinorrhea.    Eyes: Negative.  Negative for visual disturbance.   Respiratory:  Negative for cough, shortness of breath and wheezing.    Cardiovascular: Negative.  Negative for chest pain, palpitations and leg swelling.   Gastrointestinal: Negative.  Negative for abdominal distention, abdominal pain, constipation, diarrhea, nausea and vomiting.   Endocrine: Negative.         EXCESSIVE HUNGER ,EXCESSIVE SWEATING.   Genitourinary:  Negative for dysuria and urgency.   Musculoskeletal: Negative.  Negative for back pain.   Skin:  Negative for rash.        PER HPI.   Allergic/Immunologic: Negative for immunocompromised state.   Neurological: Negative.  Negative for dizziness, weakness, light-headedness and headaches.   Psychiatric/Behavioral: Negative.  Negative for agitation.        Objective   Physical Exam  Constitutional:       General: She is not in acute distress.  HENT:      Head: Normocephalic.      Nose: Nose normal.      Mouth/Throat:      Mouth: Mucous membranes are moist.   Eyes:      Conjunctiva/sclera: Conjunctivae normal.      Pupils: Pupils are equal, round, and reactive to light.   Cardiovascular:      Rate and Rhythm: Normal rate and regular rhythm.      Pulses: Normal pulses.      Heart sounds: Normal heart sounds.   Pulmonary:      Effort: No respiratory distress.      Breath sounds: No wheezing.   Chest:      Chest wall: No tenderness.   Abdominal:      General: Abdomen is flat. Bowel sounds are normal.       Palpations: Abdomen is soft.      Tenderness: There is no abdominal tenderness.   Musculoskeletal:         General: No tenderness. Normal range of motion.      Cervical back: Normal range of motion.   Lymphadenopathy:      Cervical: No cervical adenopathy.   Skin:     General: Skin is warm and dry.      Findings: No rash.      Comments: FACIAL ACNE WITH MILD SCARING OF FACE . B/L SHOULDERS , UPPER BACK.   Neurological:      General: No focal deficit present.      Mental Status: She is alert. Mental status is at baseline.   Psychiatric:         Mood and Affect: Mood normal.         Behavior: Behavior normal.         Assessment/Plan   1. Acne, unspecified acne type  doxycycline (Vibramycin) 100 mg capsule    doxycycline (Vibramycin) 100 mg capsule      2. Mild persistent asthma without complication ([facility])  mometasone (Asmanex) 110 mcg/ actuation (30) inhaler    DISCONTINUED: mometasone (Asmanex) 110 mcg/ actuation (30) inhaler      3. Excessive sweating  TSH with reflex to Free T4 if abnormal      4. Vitamin D deficiency  Vitamin D 25-Hydroxy,Total (for eval of Vitamin D levels)      5. Excessive hunger  TSH with reflex to Free T4 if abnormal      6. Hyperhidrosis        ADVISED TO HAVE LOW FAT AND LOW CALORIE DIET AND TO LOOSE WEIGHT, DAILY EXERCISE.  ADVISED FOR RELAXATION TECHNIQUES AND TO CUT DOWN ON CAFFEINE, TO DRINK MORE WATER.  ADVISED TO APPLY SUNSCREEN LOTION .  ADVISED TO APPLY COLD COMPRESSION TO SKIN PRN FOR ITCHING.  Advised to keep the skin dry, prevention of sweating.   PREVENTION OF PREGNANCY  WHILE TAKING DOXY.    MDM    1) COMPLEXITY: 1 UNDIAGNOSED NEW PROBLEM WITH UNCERTAIN PROGNOSIS  2)DATA: TESTS INTERPRETED AND OR ORDERED, TOOK INDEPENDENT HISTORY OR RECORDS REVIEWED  3)RISK: MODERATE RISK DUE TO NATURE OF MEDICAL CONDITIONS/COMORBIDITY OR MEDICATIONS ORDERED OR SURGICAL OR PROCEDURE REFERRAL, .       2 weeks with PCP   Additional Notes: -no labs needed for next months visit Render Risk Assessment In Note?: no Detail Level: Simple

## 2024-05-23 ENCOUNTER — APPOINTMENT (OUTPATIENT)
Dept: OBSTETRICS AND GYNECOLOGY | Facility: CLINIC | Age: 24
End: 2024-05-23
Payer: COMMERCIAL

## 2024-05-31 ENCOUNTER — APPOINTMENT (OUTPATIENT)
Dept: SURGERY | Facility: CLINIC | Age: 24
End: 2024-05-31
Payer: COMMERCIAL

## 2024-06-06 ENCOUNTER — OFFICE VISIT (OUTPATIENT)
Dept: PRIMARY CARE | Facility: CLINIC | Age: 24
End: 2024-06-06
Payer: COMMERCIAL

## 2024-06-06 ENCOUNTER — TELEPHONE (OUTPATIENT)
Dept: PRIMARY CARE | Facility: CLINIC | Age: 24
End: 2024-06-06

## 2024-06-06 VITALS
BODY MASS INDEX: 20.4 KG/M2 | DIASTOLIC BLOOD PRESSURE: 68 MMHG | SYSTOLIC BLOOD PRESSURE: 102 MMHG | WEIGHT: 130 LBS | HEART RATE: 89 BPM | HEIGHT: 67 IN

## 2024-06-06 DIAGNOSIS — F41.9 ANXIETY: ICD-10-CM

## 2024-06-06 DIAGNOSIS — E55.9 VITAMIN D DEFICIENCY: ICD-10-CM

## 2024-06-06 DIAGNOSIS — R10.9 RIGHT FLANK PAIN: ICD-10-CM

## 2024-06-06 DIAGNOSIS — N20.0 KIDNEY STONE ON RIGHT SIDE: Primary | ICD-10-CM

## 2024-06-06 DIAGNOSIS — G43.709 CHRONIC MIGRAINE WITHOUT AURA WITHOUT STATUS MIGRAINOSUS, NOT INTRACTABLE: ICD-10-CM

## 2024-06-06 PROCEDURE — 99214 OFFICE O/P EST MOD 30 MIN: CPT | Performed by: INTERNAL MEDICINE

## 2024-06-06 PROCEDURE — 1036F TOBACCO NON-USER: CPT | Performed by: INTERNAL MEDICINE

## 2024-06-06 RX ORDER — SUMATRIPTAN 50 MG/1
50 TABLET, FILM COATED ORAL ONCE AS NEEDED
Qty: 9 TABLET | Refills: 11 | Status: SHIPPED | OUTPATIENT
Start: 2024-06-06

## 2024-06-06 RX ORDER — KETOROLAC TROMETHAMINE 10 MG/1
1 TABLET, FILM COATED ORAL EVERY 6 HOURS PRN
COMMUNITY
Start: 2024-06-04

## 2024-06-06 RX ORDER — AMITRIPTYLINE HYDROCHLORIDE 10 MG/1
10 TABLET, FILM COATED ORAL NIGHTLY
Qty: 30 TABLET | Refills: 11 | Status: SHIPPED | OUTPATIENT
Start: 2024-06-06

## 2024-06-06 ASSESSMENT — ENCOUNTER SYMPTOMS
BACK PAIN: 0
PALPITATIONS: 0
GASTROINTESTINAL NEGATIVE: 1
SHORTNESS OF BREATH: 0
FEVER: 0
COUGH: 0
WHEEZING: 0
WEAKNESS: 0
RHINORRHEA: 0
DIZZINESS: 0
VOMITING: 0
ABDOMINAL PAIN: 0
EYES NEGATIVE: 1
CARDIOVASCULAR NEGATIVE: 1
FLANK PAIN: 1
ABDOMINAL DISTENTION: 0
LIGHT-HEADEDNESS: 0
DIARRHEA: 0
ENDOCRINE NEGATIVE: 1
CONSTIPATION: 0
HEADACHES: 1
AGITATION: 0
DYSURIA: 0
NAUSEA: 0
CHILLS: 0
NERVOUS/ANXIOUS: 1

## 2024-06-06 NOTE — TELEPHONE ENCOUNTER
PATIENT CALLED AND LEFT A MESSAGE ON OUR VOICEMAIL. SHE HAD AN APPOINTMENT EARLIER TODAY AND SAID SHE WAS TOLD THAT SHE HAD A CYST ON HER LIVER. SHE QUESTIONS HOW LONG THIS HAS BEEN THERE BECAUSE SHE DOESN'T SEE ANY MENTION OF THIS PREVIOUSLY. PLEASE ADVISE.

## 2024-06-06 NOTE — PROGRESS NOTES
Subjective   Patient ID: Jessi Alvarado is a 23 y.o. female who presents for Follow-up (2 WEEK F/U WITH LABS. F/U ER VISIT x 2 [facility] - KIDNEY STONE. PENDING APPOINTMENT WITH DR. ESPINO NEXT MONTH BUT PATIENT IS STILL HAVING LOTS OF DISCOMFORT. ).  HPI  LAB F/U. F/U AFTER ER VISIT FOR FLANK PAIN YESTERDAY , HAD LABS AND CT OF KIDNEY DONE  ,WAS DX WITH R KIDNEY STONE .STILL HAS THE PAIN 6-8/10 .WAS DISCHARGED ON PRN TORADOL. NEEDS TO SEE THE UROLOGIST SOONER (HER SCHEDULED VISIT IS IN 1.5 MOTH).. WORSENING CHRONIC MIGRIANE , HAPPENS MORE FREQUENT, MORE SEVERE 8/10 ON AND OFF, LAST FOR SEVERAL DAYS . OTC TX DOESN'T HELP. ANXIETY.  Review of Systems   Constitutional:  Negative for chills and fever.   HENT: Negative.  Negative for congestion, postnasal drip and rhinorrhea.    Eyes: Negative.  Negative for visual disturbance.   Respiratory:  Negative for cough, shortness of breath and wheezing.    Cardiovascular: Negative.  Negative for chest pain, palpitations and leg swelling.   Gastrointestinal: Negative.  Negative for abdominal distention, abdominal pain, constipation, diarrhea, nausea and vomiting.   Endocrine: Negative.    Genitourinary:  Positive for flank pain. Negative for dysuria and urgency.   Musculoskeletal:  Negative for back pain.   Skin: Negative.  Negative for rash.   Allergic/Immunologic: Negative for immunocompromised state.   Neurological:  Positive for headaches. Negative for dizziness, weakness and light-headedness.   Psychiatric/Behavioral:  Negative for agitation. The patient is nervous/anxious.        Objective   Physical Exam  Constitutional:       General: She is not in acute distress.  HENT:      Head: Normocephalic.      Nose: Nose normal.      Mouth/Throat:      Mouth: Mucous membranes are moist.   Eyes:      Conjunctiva/sclera: Conjunctivae normal.      Pupils: Pupils are equal, round, and reactive to light.   Cardiovascular:      Rate and Rhythm: Normal rate and regular  rhythm.      Pulses: Normal pulses.      Heart sounds: Normal heart sounds.   Pulmonary:      Effort: No respiratory distress.      Breath sounds: No wheezing.   Chest:      Chest wall: No tenderness.   Abdominal:      General: Abdomen is flat. Bowel sounds are normal.      Palpations: Abdomen is soft.      Tenderness: There is no abdominal tenderness.      Comments: R FLANK TENDERNESS   Musculoskeletal:         General: No tenderness. Normal range of motion.      Cervical back: Normal range of motion.   Lymphadenopathy:      Cervical: No cervical adenopathy.   Skin:     General: Skin is warm and dry.      Findings: No rash.   Neurological:      General: No focal deficit present.      Mental Status: She is alert. Mental status is at baseline.   Psychiatric:         Mood and Affect: Mood normal.         Behavior: Behavior normal.         Assessment/Plan   1. Kidney stone on right side  Referral to Urology      2. Chronic migraine without aura without status migrainosus, not intractable  amitriptyline (Elavil) 10 mg tablet    SUMAtriptan (Imitrex) 50 mg tablet      3. Anxiety  amitriptyline (Elavil) 10 mg tablet      4. Right flank pain        5. Vitamin D deficiency          TEST RESULTS WERE DISCUSSED.  ADVISED TO CUT DOWN CAFFEINE (PER PT SHE DOESN'T DRINK MUCH) AND TO DRINK MORE WATER TO PASS THE KIDNEY STONE.  ADVISED TO GO TO ER IF FLANK PAIN GETS WORSE.   WILL START ELAVIL FOR MIGRIANE PREVENTION AND ANXIETY.     MDM    1) COMPLEXITY: 1 UNDIAGNOSED NEW PROBLEM WITH UNCERTAIN PROGNOSIS  2)DATA: TESTS INTERPRETED AND OR ORDERED, TOOK INDEPENDENT HISTORY OR RECORDS REVIEWED  3)RISK: MODERATE RISK DUE TO NATURE OF MEDICAL CONDITIONS/COMORBIDITY OR MEDICATIONS ORDERED OR SURGICAL OR PROCEDURE REFERRAL, .      2 weeks with PCP.

## 2024-06-12 ASSESSMENT — ENCOUNTER SYMPTOMS
SHORTNESS OF BREATH: 0
NAUSEA: 0
ALLERGIC/IMMUNOLOGIC NEGATIVE: 1
COUGH: 0
ENDOCRINE NEGATIVE: 1
PSYCHIATRIC NEGATIVE: 1
DIFFICULTY URINATING: 0
EYES NEGATIVE: 1
FEVER: 0
CHILLS: 0

## 2024-06-13 ENCOUNTER — APPOINTMENT (OUTPATIENT)
Dept: UROLOGY | Facility: CLINIC | Age: 24
End: 2024-06-13
Payer: COMMERCIAL

## 2024-06-13 VITALS — RESPIRATION RATE: 16 BRPM | WEIGHT: 133 LBS | BODY MASS INDEX: 20.83 KG/M2

## 2024-06-13 DIAGNOSIS — N20.0 KIDNEY STONES: ICD-10-CM

## 2024-06-13 DIAGNOSIS — R10.9 FLANK PAIN: ICD-10-CM

## 2024-06-13 DIAGNOSIS — N20.0 KIDNEY STONE ON RIGHT SIDE: ICD-10-CM

## 2024-06-13 PROCEDURE — 99203 OFFICE O/P NEW LOW 30 MIN: CPT | Performed by: UROLOGY

## 2024-06-13 PROCEDURE — 1036F TOBACCO NON-USER: CPT | Performed by: UROLOGY

## 2024-06-14 ENCOUNTER — APPOINTMENT (OUTPATIENT)
Dept: SURGERY | Facility: CLINIC | Age: 24
End: 2024-06-14
Payer: COMMERCIAL

## 2024-06-19 ENCOUNTER — APPOINTMENT (OUTPATIENT)
Dept: PRIMARY CARE | Facility: CLINIC | Age: 24
End: 2024-06-19
Payer: COMMERCIAL

## 2024-06-19 VITALS
HEIGHT: 67 IN | HEART RATE: 98 BPM | SYSTOLIC BLOOD PRESSURE: 123 MMHG | WEIGHT: 129 LBS | BODY MASS INDEX: 20.25 KG/M2 | DIASTOLIC BLOOD PRESSURE: 78 MMHG

## 2024-06-19 DIAGNOSIS — F90.2 ATTENTION DEFICIT HYPERACTIVITY DISORDER (ADHD), COMBINED TYPE: ICD-10-CM

## 2024-06-19 DIAGNOSIS — N20.0 KIDNEY STONE ON RIGHT SIDE: ICD-10-CM

## 2024-06-19 DIAGNOSIS — J45.20 MILD INTERMITTENT ASTHMA WITHOUT COMPLICATION (HHS-HCC): Primary | ICD-10-CM

## 2024-06-19 DIAGNOSIS — Z79.899 MEDICATION MANAGEMENT: ICD-10-CM

## 2024-06-19 LAB
AMPHETAMINES UR QL SCN: NORMAL
BARBITURATES UR QL SCN: NORMAL
BZE UR QL SCN: NORMAL
CANNABINOIDS UR QL SCN: NORMAL
CREAT UR-MCNC: 132.9 MG/DL (ref 20–320)
PCP UR QL SCN: NORMAL

## 2024-06-19 PROCEDURE — 82570 ASSAY OF URINE CREATININE: CPT

## 2024-06-19 PROCEDURE — 80373 DRUG SCREENING TRAMADOL: CPT

## 2024-06-19 PROCEDURE — 80354 DRUG SCREENING FENTANYL: CPT

## 2024-06-19 PROCEDURE — 1036F TOBACCO NON-USER: CPT | Performed by: INTERNAL MEDICINE

## 2024-06-19 PROCEDURE — 80368 SEDATIVE HYPNOTICS: CPT

## 2024-06-19 PROCEDURE — 80365 DRUG SCREENING OXYCODONE: CPT

## 2024-06-19 PROCEDURE — 80361 OPIATES 1 OR MORE: CPT

## 2024-06-19 PROCEDURE — 99214 OFFICE O/P EST MOD 30 MIN: CPT | Performed by: INTERNAL MEDICINE

## 2024-06-19 PROCEDURE — 80307 DRUG TEST PRSMV CHEM ANLYZR: CPT

## 2024-06-19 PROCEDURE — 80346 BENZODIAZEPINES1-12: CPT

## 2024-06-19 PROCEDURE — 80358 DRUG SCREENING METHADONE: CPT

## 2024-06-19 ASSESSMENT — ENCOUNTER SYMPTOMS
CHILLS: 0
CONSTIPATION: 0
ADENOPATHY: 0
ENDOCRINE NEGATIVE: 1
AGITATION: 0
HEMATOLOGIC/LYMPHATIC NEGATIVE: 1
GASTROINTESTINAL NEGATIVE: 1
CONFUSION: 0
BACK PAIN: 0
EYE DISCHARGE: 0
DYSURIA: 0
NUMBNESS: 0
ABDOMINAL PAIN: 0
VOMITING: 0
EYES NEGATIVE: 1
DIARRHEA: 0
DECREASED CONCENTRATION: 1
HEADACHES: 0
COUGH: 0
ABDOMINAL DISTENTION: 0
CONSTITUTIONAL NEGATIVE: 1
NEUROLOGICAL NEGATIVE: 1
RESPIRATORY NEGATIVE: 1
MUSCULOSKELETAL NEGATIVE: 1
NECK STIFFNESS: 0
ALLERGIC/IMMUNOLOGIC NEGATIVE: 1
FEVER: 0
WHEEZING: 0
NAUSEA: 0
SHORTNESS OF BREATH: 0
PALPITATIONS: 0
CARDIOVASCULAR NEGATIVE: 1
JOINT SWELLING: 0
LIGHT-HEADEDNESS: 0

## 2024-06-19 NOTE — PROGRESS NOTES
Subjective   Patient ID: Jessi Alvarado is a 24 y.o. female who presents for Follow-up (2 wk fu asthma).  Here for fu after kidney stone saw dr tessy has fu , feels better, no pain now  Asthma has janis stable    Co difficulty focusing and concentration  Was diagnosed with ADHD as a kid, used to be on medications    Her mom stopped on when she was 15    Now has been having difficulty with work can not stand still and focus, affecting her work perfrormance        Review of Systems   Constitutional: Negative.  Negative for chills and fever.   HENT: Negative.  Negative for congestion.    Eyes: Negative.  Negative for discharge.   Respiratory: Negative.  Negative for cough, shortness of breath and wheezing.    Cardiovascular: Negative.  Negative for chest pain, palpitations and leg swelling.   Gastrointestinal: Negative.  Negative for abdominal distention, abdominal pain, constipation, diarrhea, nausea and vomiting.   Endocrine: Negative.    Genitourinary: Negative.  Negative for dysuria and urgency.   Musculoskeletal: Negative.  Negative for back pain, joint swelling and neck stiffness.   Skin: Negative.  Negative for rash.   Allergic/Immunologic: Negative.  Negative for immunocompromised state.   Neurological: Negative.  Negative for light-headedness, numbness and headaches.   Hematological: Negative.  Negative for adenopathy.   Psychiatric/Behavioral:  Positive for decreased concentration. Negative for agitation, behavioral problems and confusion.    All other systems reviewed and are negative.      Objective   Physical Exam  Vitals reviewed.   Constitutional:       General: She is not in acute distress.     Appearance: Normal appearance.   HENT:      Head: Normocephalic and atraumatic.      Nose: Nose normal.   Eyes:      Conjunctiva/sclera: Conjunctivae normal.      Pupils: Pupils are equal, round, and reactive to light.   Neck:      Vascular: No carotid bruit.   Cardiovascular:      Rate and Rhythm: Normal rate  "and regular rhythm.      Pulses: Normal pulses.      Heart sounds:      No gallop.   Pulmonary:      Effort: Pulmonary effort is normal. No respiratory distress.      Breath sounds: Normal breath sounds. No wheezing.   Abdominal:      General: Bowel sounds are normal.      Palpations: Abdomen is soft.      Tenderness: There is no abdominal tenderness.   Musculoskeletal:         General: Normal range of motion.      Cervical back: Normal range of motion. No rigidity.   Lymphadenopathy:      Cervical: No cervical adenopathy.   Skin:     General: Skin is warm.      Findings: No rash.   Neurological:      General: No focal deficit present.      Mental Status: She is alert and oriented to person, place, and time.   Psychiatric:         Mood and Affect: Mood normal.         Behavior: Behavior normal.       /78 (BP Location: Left arm, Patient Position: Sitting)   Pulse 98   Ht 1.702 m (5' 7\")   Wt 58.5 kg (129 lb)   BMI 20.20 kg/m²    Hemoglobin A1C   Date/Time Value Ref Range Status   09/08/2023 09:32 AM 5.4 % Final     Comment:          Diagnosis of Diabetes-Adults   Non-Diabetic: < or = 5.6%   Increased risk for developing diabetes: 5.7-6.4%   Diagnostic of diabetes: > or = 6.5%  .       Monitoring of Diabetes                Age (y)     Therapeutic Goal (%)   Adults:          >18           <7.0   Pediatrics:    13-18           <7.5                   7-12           <8.0                   0- 6            7.5-8.5   American Diabetes Association. Diabetes Care 33(S1), Jan 2010.     Assessment/Plan   Problem List Items Addressed This Visit       ADHD (attention deficit hyperactivity disorder)    Mild intermittent asthma without complication ([facility]) - Primary     Other Visit Diagnoses       Kidney stone on right side        Medication management        Relevant Orders    Opiate/Opioid/Benzo Prescription Compliance             OARRS HAS BEEN REVIEWED AND IS CONSISTENT WITH PRESCRIBED MEDICATIONS, CONSIDERED THE " RISK OF ABUSE, DEPENDENCE, ADDICTION AND DIVERSION, MEDICATION IS FELT TO BE CLINICALLY APPROPRIATE ON THE DOCUMENTED DIAGNOSIS    Will consider adderal 20 mg daily if utox comes back negative    Utox done today    Ct abdomen dw pt      MDM    1) COMPLEXITY: 1 OR MORE CHRONIC CONDITION WITH EXACERBATION, OR PROGRESSION OR SIDE EFFECT OF TREATMENT ADDRESSED  2)DATA: TESTS INTERPRETED AND OR ORDERED, TOOK INDEPENDENT HISTORY OR RECORDS REVIEWED  3)RISK: MODERATE RISK DUE TO NATURE OF MEDICAL CONDITIONS/COMORBIDITY OR MEDICATIONS ORDERED OR SURGICAL OR PROCEDURE REFERRAL, .        Fu 1 mo

## 2024-06-20 ENCOUNTER — ANESTHESIA EVENT (OUTPATIENT)
Dept: OPERATING ROOM | Facility: HOSPITAL | Age: 24
End: 2024-06-20
Payer: COMMERCIAL

## 2024-06-20 ENCOUNTER — APPOINTMENT (OUTPATIENT)
Dept: RADIOLOGY | Facility: HOSPITAL | Age: 24
End: 2024-06-20
Payer: COMMERCIAL

## 2024-06-20 ENCOUNTER — HOSPITAL ENCOUNTER (OUTPATIENT)
Facility: HOSPITAL | Age: 24
Setting detail: OUTPATIENT SURGERY
Discharge: HOME | End: 2024-06-20
Attending: SURGERY | Admitting: SURGERY
Payer: COMMERCIAL

## 2024-06-20 ENCOUNTER — ANESTHESIA (OUTPATIENT)
Dept: OPERATING ROOM | Facility: HOSPITAL | Age: 24
End: 2024-06-20
Payer: COMMERCIAL

## 2024-06-20 VITALS
DIASTOLIC BLOOD PRESSURE: 81 MMHG | OXYGEN SATURATION: 100 % | HEART RATE: 54 BPM | TEMPERATURE: 97.4 F | RESPIRATION RATE: 14 BRPM | HEIGHT: 67 IN | SYSTOLIC BLOOD PRESSURE: 136 MMHG | WEIGHT: 128.75 LBS | BODY MASS INDEX: 20.21 KG/M2

## 2024-06-20 DIAGNOSIS — Z30.46 ENCOUNTER FOR NEXPLANON REMOVAL: Primary | ICD-10-CM

## 2024-06-20 LAB — HCG UR QL IA.RAPID: NEGATIVE

## 2024-06-20 PROCEDURE — 2500000005 HC RX 250 GENERAL PHARMACY W/O HCPCS: Performed by: SURGERY

## 2024-06-20 PROCEDURE — 2720000007 HC OR 272 NO HCPCS: Performed by: SURGERY

## 2024-06-20 PROCEDURE — 3700000002 HC GENERAL ANESTHESIA TIME - EACH INCREMENTAL 1 MINUTE: Performed by: SURGERY

## 2024-06-20 PROCEDURE — 2500000004 HC RX 250 GENERAL PHARMACY W/ HCPCS (ALT 636 FOR OP/ED): Performed by: ANESTHESIOLOGY

## 2024-06-20 PROCEDURE — 81025 URINE PREGNANCY TEST: CPT | Performed by: ANESTHESIOLOGY

## 2024-06-20 PROCEDURE — 2500000004 HC RX 250 GENERAL PHARMACY W/ HCPCS (ALT 636 FOR OP/ED): Mod: JZ | Performed by: SURGERY

## 2024-06-20 PROCEDURE — 3600000008 HC OR TIME - EACH INCREMENTAL 1 MINUTE - PROCEDURE LEVEL THREE: Performed by: SURGERY

## 2024-06-20 PROCEDURE — 3600000003 HC OR TIME - INITIAL BASE CHARGE - PROCEDURE LEVEL THREE: Performed by: SURGERY

## 2024-06-20 PROCEDURE — 3700000001 HC GENERAL ANESTHESIA TIME - INITIAL BASE CHARGE: Performed by: SURGERY

## 2024-06-20 PROCEDURE — 7100000009 HC PHASE TWO TIME - INITIAL BASE CHARGE: Performed by: SURGERY

## 2024-06-20 PROCEDURE — 24201 RMVL FB UPPER ARM/ELBW DEEP: CPT | Performed by: SURGERY

## 2024-06-20 PROCEDURE — 7100000010 HC PHASE TWO TIME - EACH INCREMENTAL 1 MINUTE: Performed by: SURGERY

## 2024-06-20 PROCEDURE — 2500000005 HC RX 250 GENERAL PHARMACY W/O HCPCS: Performed by: ANESTHESIOLOGY

## 2024-06-20 RX ORDER — MORPHINE SULFATE 4 MG/ML
4 INJECTION, SOLUTION INTRAMUSCULAR; INTRAVENOUS EVERY 5 MIN PRN
Status: DISCONTINUED | OUTPATIENT
Start: 2024-06-20 | End: 2024-06-20 | Stop reason: HOSPADM

## 2024-06-20 RX ORDER — MIDAZOLAM HYDROCHLORIDE 1 MG/ML
1 INJECTION INTRAMUSCULAR; INTRAVENOUS ONCE
Status: COMPLETED | OUTPATIENT
Start: 2024-06-20 | End: 2024-06-20

## 2024-06-20 RX ORDER — BUPIVACAINE HYDROCHLORIDE 2.5 MG/ML
INJECTION, SOLUTION INFILTRATION; PERINEURAL AS NEEDED
Status: DISCONTINUED | OUTPATIENT
Start: 2024-06-20 | End: 2024-06-20 | Stop reason: HOSPADM

## 2024-06-20 RX ORDER — SODIUM CHLORIDE, SODIUM LACTATE, POTASSIUM CHLORIDE, CALCIUM CHLORIDE 600; 310; 30; 20 MG/100ML; MG/100ML; MG/100ML; MG/100ML
100 INJECTION, SOLUTION INTRAVENOUS CONTINUOUS
Status: DISCONTINUED | OUTPATIENT
Start: 2024-06-20 | End: 2024-06-20 | Stop reason: HOSPADM

## 2024-06-20 RX ORDER — LABETALOL HYDROCHLORIDE 5 MG/ML
5 INJECTION, SOLUTION INTRAVENOUS ONCE AS NEEDED
Status: DISCONTINUED | OUTPATIENT
Start: 2024-06-20 | End: 2024-06-20 | Stop reason: HOSPADM

## 2024-06-20 RX ORDER — MORPHINE SULFATE 4 MG/ML
2 INJECTION, SOLUTION INTRAMUSCULAR; INTRAVENOUS EVERY 5 MIN PRN
Status: DISCONTINUED | OUTPATIENT
Start: 2024-06-20 | End: 2024-06-20 | Stop reason: HOSPADM

## 2024-06-20 RX ORDER — CEFAZOLIN SODIUM 2 G/100ML
2 INJECTION, SOLUTION INTRAVENOUS ONCE
Status: COMPLETED | OUTPATIENT
Start: 2024-06-20 | End: 2024-06-20

## 2024-06-20 RX ORDER — OXYCODONE HYDROCHLORIDE 5 MG/1
5 TABLET ORAL EVERY 6 HOURS PRN
Qty: 5 TABLET | Refills: 0 | Status: SHIPPED | OUTPATIENT
Start: 2024-06-20 | End: 2024-06-27

## 2024-06-20 RX ORDER — OXYCODONE HYDROCHLORIDE 5 MG/1
5 TABLET ORAL EVERY 4 HOURS PRN
Status: DISCONTINUED | OUTPATIENT
Start: 2024-06-20 | End: 2024-06-20 | Stop reason: HOSPADM

## 2024-06-20 RX ORDER — PROPOFOL 10 MG/ML
INJECTION, EMULSION INTRAVENOUS CONTINUOUS PRN
Status: DISCONTINUED | OUTPATIENT
Start: 2024-06-20 | End: 2024-06-20

## 2024-06-20 RX ORDER — LIDOCAINE HYDROCHLORIDE 10 MG/ML
INJECTION, SOLUTION EPIDURAL; INFILTRATION; INTRACAUDAL; PERINEURAL AS NEEDED
Status: DISCONTINUED | OUTPATIENT
Start: 2024-06-20 | End: 2024-06-20 | Stop reason: HOSPADM

## 2024-06-20 RX ORDER — ACETAMINOPHEN 325 MG/1
975 TABLET ORAL ONCE
Status: COMPLETED | OUTPATIENT
Start: 2024-06-20 | End: 2024-06-20

## 2024-06-20 RX ORDER — ONDANSETRON HYDROCHLORIDE 2 MG/ML
4 INJECTION, SOLUTION INTRAVENOUS ONCE AS NEEDED
Status: DISCONTINUED | OUTPATIENT
Start: 2024-06-20 | End: 2024-06-20 | Stop reason: HOSPADM

## 2024-06-20 RX ORDER — SODIUM CHLORIDE, SODIUM LACTATE, POTASSIUM CHLORIDE, CALCIUM CHLORIDE 600; 310; 30; 20 MG/100ML; MG/100ML; MG/100ML; MG/100ML
50 INJECTION, SOLUTION INTRAVENOUS CONTINUOUS
Status: DISCONTINUED | OUTPATIENT
Start: 2024-06-20 | End: 2024-06-20 | Stop reason: HOSPADM

## 2024-06-20 RX ADMIN — SODIUM CHLORIDE, POTASSIUM CHLORIDE, SODIUM LACTATE AND CALCIUM CHLORIDE 100 ML/HR: 600; 310; 30; 20 INJECTION, SOLUTION INTRAVENOUS at 10:21

## 2024-06-20 RX ADMIN — CEFAZOLIN SODIUM 2 G: 2 INJECTION, SOLUTION INTRAVENOUS at 11:01

## 2024-06-20 RX ADMIN — MORPHINE SULFATE 2 MG: 4 INJECTION, SOLUTION INTRAMUSCULAR; INTRAVENOUS at 12:44

## 2024-06-20 RX ADMIN — MORPHINE SULFATE 2 MG: 4 INJECTION, SOLUTION INTRAMUSCULAR; INTRAVENOUS at 12:31

## 2024-06-20 RX ADMIN — MIDAZOLAM HYDROCHLORIDE 1 MG: 1 INJECTION, SOLUTION INTRAMUSCULAR; INTRAVENOUS at 11:09

## 2024-06-20 RX ADMIN — ACETAMINOPHEN 975 MG: 325 TABLET ORAL at 10:21

## 2024-06-20 ASSESSMENT — PAIN SCALES - GENERAL
PAINLEVEL_OUTOF10: 1
PAINLEVEL_OUTOF10: 4
PAINLEVEL_OUTOF10: 4
PAINLEVEL_OUTOF10: 0 - NO PAIN
PAINLEVEL_OUTOF10: 0 - NO PAIN

## 2024-06-20 ASSESSMENT — PAIN DESCRIPTION - DESCRIPTORS
DESCRIPTORS: ACHING;BURNING;DISCOMFORT
DESCRIPTORS: ACHING;BURNING;DISCOMFORT
DESCRIPTORS: DISCOMFORT

## 2024-06-20 ASSESSMENT — PAIN - FUNCTIONAL ASSESSMENT
PAIN_FUNCTIONAL_ASSESSMENT: 0-10

## 2024-06-20 NOTE — ANESTHESIA POSTPROCEDURE EVALUATION
Patient: Jessi Alvarado    Procedure Summary       Date: 06/20/24 Room / Location: [facility] OR 01 / Virtual [facility] OR    Anesthesia Start: 1132 Anesthesia Stop: 1215    Procedure: Removal Foreign Body Upper Extremity requesting flouroscopy (Right) Diagnosis:       Encounter for Nexplanon removal      (Encounter for Nexplanon removal [Z30.46])    Surgeons: Idania Holloway MD Responsible Provider: Sander Stockton MD    Anesthesia Type: MAC ASA Status: 2            Anesthesia Type: MAC    Vitals Value Taken Time   /66 06/20/24 1230   Temp 37 06/20/24 1306   Pulse 64 06/20/24 1230   Resp 16 06/20/24 1230   SpO2 97 06/20/24 1306       Anesthesia Post Evaluation    Patient location during evaluation: bedside  Patient participation: complete - patient participated  Level of consciousness: sleepy but conscious  Pain management: adequate  Airway patency: patent  Cardiovascular status: acceptable  Respiratory status: acceptable  Hydration status: acceptable  Postoperative Nausea and Vomiting: none      No notable events documented.

## 2024-06-20 NOTE — H&P
History Of Present Illness  Jessi Alvarado is a 24 y.o. female presenting with need for removal of her birth control device in her arm.  This has been rescheduled due to her bronchitis.  She states this is resolved and she is ready to get it removed.  She actually had a period.     Past Medical History  Past Medical History:   Diagnosis Date    Anxiety and depression     Asthma attack ([facility])     Attention-deficit hyperactivity disorder, predominantly hyperactive type     Attention deficit hyperactivity disorder (ADHD), predominantly hyperactive type    Concussion with no loss of consciousness 03/02/2018    Contusion of left hand 04/18/2023    Groin abscess 04/18/2023    Hand sprain, left, sequela 04/18/2023    Hematuria 04/18/2023    Left wrist pain 04/18/2023    Left wrist sprain, sequela 04/18/2023    Lump of skin 04/18/2023    Mitral valve prolapse     Numbness of fingers 04/18/2023    Other conditions influencing health status 09/23/2020    Menarche    Personal history of other diseases of the female genital tract     History of ovarian cyst    Personal history of other diseases of the respiratory system     History of asthma    PONV (postoperative nausea and vomiting)     Post-op pain 04/18/2023    Right knee pain 04/18/2023    Stiffness of right knee, not elsewhere classified 04/18/2023       Surgical History  Past Surgical History:   Procedure Laterality Date    KNEE SURGERY      WISDOM TOOTH EXTRACTION          Social History  She reports that she has quit smoking. Her smoking use included cigarettes. She has never been exposed to tobacco smoke. She has never used smokeless tobacco. She reports that she does not currently use alcohol. She reports that she does not use drugs.    Family History  Family History   Problem Relation Name Age of Onset    No Known Problems Mother      ADD / ADHD Father      Cancer Maternal Grandmother      Hypertension Maternal Grandfather      IRMA disease Paternal  "Grandmother      Irritable bowel syndrome Paternal Grandmother      Hypertension Paternal Grandfather          Allergies  Cinnamon, Pomegranate fruit extract, Sulfa (sulfonamide antibiotics), and Sulfamethoxazole-trimethoprim    Review of Systems  Patient denies any neurologic changes.  She denies any chest pain or shortness of breath.  She denies any change in her bowel habits or urination.    Physical Exam  Awake alert no acute distress  Respirations unlabored  Regular rate  Abdomen soft nontender  Inner inner arm at the base of her tattoo you could feel the birth control stick thing vaguely.  There is no cellulitis.    Last Recorded Vitals  Blood pressure 105/64, pulse 66, temperature 36.6 °C (97.8 °F), temperature source Temporal, resp. rate 18, height 1.702 m (5' 7\"), weight 58.4 kg (128 lb 12 oz), SpO2 100%, not currently breastfeeding.    Assessment/Plan   Principal Problem:    Encounter for Nexplanon removal  Patient is seen with her mother.  She denies any change in her health history since seen in the office.  We reviewed the procedure risks and potential complications as well as the fact we may need to use fluoroscopy and we could even miss the implant.  All questions were answered and they asked us to proceed.    Idania Holloway MD    "

## 2024-06-20 NOTE — ANESTHESIA PREPROCEDURE EVALUATION
Patient: Jessi Alvarado    Procedure Information       Date/Time: 06/20/24 1115    Procedure: Removal Foreign Body Upper Extremity requesting flouroscopy (Right) - requesting flouroscopy    Location: [facility] OR  / Virtual [facility] OR    Surgeons: Idania Holloway MD            Relevant Problems   Cardiac   (+) MVP (mitral valve prolapse)      Pulmonary   (+) Mild intermittent asthma without complication ([facility])   (+) Mild persistent asthma without complication ([facility])      Neuro   (+) Anxiety   (+) Bad dreams      GI   (+) Irritable bowel syndrome with mixed bowel habits      GYN   (+) Fibroid uterus       Clinical information reviewed:   Tobacco  Allergies  Meds   Med Hx  Surg Hx  OB Status  Fam Hx  Soc   Hx        NPO Detail:  No data recorded     Physical Exam    Airway  Mallampati: II  TM distance: >3 FB     Cardiovascular   Rhythm: regular  Rate: normal     Dental    Pulmonary    Abdominal          Anesthesia Plan    History of general anesthesia?: yes  History of complications of general anesthesia?: no    ASA 2     MAC     Anesthetic plan and risks discussed with patient.

## 2024-06-20 NOTE — OP NOTE
Removal Foreign Body Upper Extremity requesting flouroscopy (R) Operative Note     Date: 2024  OR Location: [facility] OR    Name: Jessi Alvarado, : 2000, Age: 24 y.o., MRN: [id number], Sex: female    Diagnosis  Pre-op Diagnosis     * Encounter for Nexplanon removal [Z30.46] Post-op Diagnosis     * Encounter for Nexplanon removal [Z30.46]     Procedures  Removal Foreign Body Upper Extremity requesting flouroscopy  [id number] - [place] REMOVAL FOREIGN BODY UPPER ARM/ELBOW DEEP      Surgeons      * Idania Holloway - Primary    Resident/Fellow/Other Assistant:  Surgeons and Role:  * No surgeons found with a matching role *    Procedure Summary  Anesthesia: Monitor Anesthesia Care  ASA: II  Anesthesia Staff: Anesthesiologist: Sander Stockton MD  Estimated Blood Loss: 2mL  Intra-op Medications:   Administrations occurring from 1115 to 1215 on 24:   Medication Name Total Dose   BUPivacaine HCl (Marcaine) 0.25 % (2.5 mg/mL) injection 5 mL   lidocaine PF (Xylocaine) 10 mg/mL (1 %) injection 5 mL   ceFAZolin in dextrose (iso-os) (Ancef) IVPB 2 g Cannot be calculated              Anesthesia Record               Intraprocedure I/O Totals          Intake    Propofol Drip 0.00 mL    The total shown is the total volume documented since Anesthesia Start was filed.    Total Intake 0 mL          Specimen:   ID Type Source Tests Collected by Time   1 : RIGHT ARM IMPLANT GROSS ONLY Tissue FOREIGN BODY(S) SURGICAL PATHOLOGY EXAM Idania Holloway MD 2024 1201        Staff:   Circulator: Debby De Paz Circulator: Florentino De Paz Scrub: Efrain Malik Person: Tong    Findings: Radiopaque foreign body seen both with ultrasound as well as x-ray.  It was difficult to localize needles were used at dissection eventually revealed it.  It was removed in its entirety.    Indications: Jessi Alvarado is an 24 y.o. female who is having surgery for Encounter for Nexplanon removal [Z30.46].     The patient was seen in the  preoperative area. The risks, benefits, complications, treatment options, non-operative alternatives, expected recovery and outcomes were discussed with the patient. The possibilities of reaction to medication, pulmonary aspiration, injury to surrounding structures, bleeding, recurrent infection, the need for additional procedures, failure to diagnose a condition, and creating a complication requiring transfusion or operation were discussed with the patient. The patient concurred with the proposed plan, giving informed consent.  The site of surgery was properly noted/marked if necessary per policy. The patient has been actively warmed in preoperative area. Preoperative antibiotics have been ordered and given within 1 hours of incision. Venous thrombosis prophylaxis have been ordered including bilateral sequential compression devices    Procedure Details: Patient was brought to the operating room and placed supine upon the operating room table.  SCD devices were in place.  Huddle was done.  Patient was sedated and the arm prepped and draped in usual sterile fashion.  Timeout was done.  The foreign body was vaguely palpated sort of underlying the letters of her tattoo but it was quite deep.  The area was locally infiltrated with a half-and-half solution of 0.25% Marcaine and 1% lidocaine.  Incision was made and needed to be extended to allow for digital exploration of the wound.  This was carried all the way down to the muscle flaps were raised and that it was palpated between the muscle and the skin and we were able to feel the implant.  This was dissected down to and removed.  The wound was irrigated bleeding points controlled with cautery.  Subcutaneous tissues were approximated using interrupted sutures of 3-0 Vicryl.  The skin was closed with a running subcuticular stitch of 4-0 Monocryl.  Dermabond Steri-Strips and sterile dressings were applied.  Patient tolerated the procedure well and was sent to the recovery  area in stable condition.  All needle and sponge counts were correct.  Complications:  None; patient tolerated the procedure well.    Disposition: PACU - hemodynamically stable.  Condition: stable     Attending Attestation:     Idania Holloway  Phone Number: [phone]

## 2024-06-21 ENCOUNTER — TELEPHONE (OUTPATIENT)
Dept: SURGERY | Facility: CLINIC | Age: 24
End: 2024-06-21
Payer: COMMERCIAL

## 2024-06-21 DIAGNOSIS — R11.0 NAUSEA: Primary | ICD-10-CM

## 2024-06-21 RX ORDER — ONDANSETRON 4 MG/1
4 TABLET, FILM COATED ORAL EVERY 8 HOURS PRN
Qty: 10 TABLET | Refills: 0 | Status: SHIPPED | OUTPATIENT
Start: 2024-06-21

## 2024-06-21 ASSESSMENT — ENCOUNTER SYMPTOMS
CHILLS: 0
DIFFICULTY URINATING: 0
ALLERGIC/IMMUNOLOGIC NEGATIVE: 1
SHORTNESS OF BREATH: 0
ENDOCRINE NEGATIVE: 1
COUGH: 0
NAUSEA: 0
EYES NEGATIVE: 1
PSYCHIATRIC NEGATIVE: 1
FEVER: 0

## 2024-06-21 NOTE — PROGRESS NOTES
Subjective   Patient ID: Jessi Alvarado is a 24 y.o. female.    HPI  Patient is here for kub results.  She was seen on 6/5 for right flank pain. CT was done that showed stable 4mm right renal nonobstructive stone. She has not passed her stone. She has hx of kidney stones. Intermittent hematuria.       Review of Systems   Constitutional:  Negative for chills and fever.   HENT: Negative.     Eyes: Negative.    Respiratory:  Negative for cough and shortness of breath.    Cardiovascular:  Negative for chest pain and leg swelling.   Gastrointestinal:  Negative for nausea.   Endocrine: Negative.    Genitourinary:  Negative for difficulty urinating.        Negative except for documented in HPI   Allergic/Immunologic: Negative.    Neurological:         Alert & oriented X 3   Hematological:         Denies blood thinners   Psychiatric/Behavioral: Negative.         Objective   Physical Exam  Vitals and nursing note reviewed.   Pulmonary:      Effort: Pulmonary effort is normal.   Abdominal:      Palpations: Abdomen is soft.      Tenderness: There is no abdominal tenderness.   Genitourinary:     Comments: Kidneys non palpable bilaterally  Bladder non palpable or tender  Neurological:      Mental Status: She is alert.         Assessment/Plan   Diagnoses and all orders for this visit:  Kidney stones  Flank pain  Kidney stone on right side    CT and KUB reviewed-4mm stone appear to be visible on KUB  Pros/cons of R ESWL discussed  Stone prevention discussed. Diet reviewed. Discussed fluid intake  Will refer to Dr Hardy for metabolic evaluation because she has had 6 stones in her life  Lifestyle change to help prevent UTIs discussed. Encouraged fluid intake.    F/U R ESWL

## 2024-06-21 NOTE — TELEPHONE ENCOUNTER
Pt called today stating she thinks she had a reaction to morphine after surgery. She had vomiting and nausea yesterday  and she is very nauseated today.She is requesting  something for nausea to be sent to her pharmacy. A Secure chat message was sent to .

## 2024-06-25 ENCOUNTER — TELEPHONE (OUTPATIENT)
Dept: PRIMARY CARE | Facility: CLINIC | Age: 24
End: 2024-06-25
Payer: COMMERCIAL

## 2024-06-25 DIAGNOSIS — F90.2 ATTENTION DEFICIT HYPERACTIVITY DISORDER (ADHD), COMBINED TYPE: Primary | ICD-10-CM

## 2024-06-25 RX ORDER — DEXTROAMPHETAMINE SACCHARATE, AMPHETAMINE ASPARTATE MONOHYDRATE, DEXTROAMPHETAMINE SULFATE AND AMPHETAMINE SULFATE 5; 5; 5; 5 MG/1; MG/1; MG/1; MG/1
20 CAPSULE, EXTENDED RELEASE ORAL EVERY MORNING
Qty: 30 CAPSULE | Refills: 0 | Status: SHIPPED | OUTPATIENT
Start: 2024-06-25 | End: 2024-07-25

## 2024-06-26 ENCOUNTER — HOSPITAL ENCOUNTER (OUTPATIENT)
Dept: RADIOLOGY | Facility: CLINIC | Age: 24
Discharge: HOME | End: 2024-06-26
Payer: COMMERCIAL

## 2024-06-26 ENCOUNTER — APPOINTMENT (OUTPATIENT)
Dept: UROLOGY | Facility: CLINIC | Age: 24
End: 2024-06-26
Payer: COMMERCIAL

## 2024-06-26 ENCOUNTER — OFFICE VISIT (OUTPATIENT)
Dept: OBSTETRICS AND GYNECOLOGY | Facility: CLINIC | Age: 24
End: 2024-06-26
Payer: COMMERCIAL

## 2024-06-26 VITALS — HEIGHT: 67 IN | BODY MASS INDEX: 20.25 KG/M2 | WEIGHT: 129 LBS

## 2024-06-26 VITALS — WEIGHT: 129 LBS | BODY MASS INDEX: 20.2 KG/M2 | RESPIRATION RATE: 16 BRPM

## 2024-06-26 DIAGNOSIS — N20.0 KIDNEY STONES: ICD-10-CM

## 2024-06-26 DIAGNOSIS — Z30.41 ENCOUNTER FOR SURVEILLANCE OF CONTRACEPTIVE PILLS: Primary | ICD-10-CM

## 2024-06-26 DIAGNOSIS — N20.0 KIDNEY STONE ON RIGHT SIDE: ICD-10-CM

## 2024-06-26 DIAGNOSIS — R10.9 FLANK PAIN: ICD-10-CM

## 2024-06-26 PROCEDURE — 74018 RADEX ABDOMEN 1 VIEW: CPT

## 2024-06-26 PROCEDURE — 99213 OFFICE O/P EST LOW 20 MIN: CPT | Performed by: OBSTETRICS & GYNECOLOGY

## 2024-06-26 PROCEDURE — 1036F TOBACCO NON-USER: CPT | Performed by: OBSTETRICS & GYNECOLOGY

## 2024-06-26 PROCEDURE — 1036F TOBACCO NON-USER: CPT | Performed by: UROLOGY

## 2024-06-26 PROCEDURE — 74018 RADEX ABDOMEN 1 VIEW: CPT | Performed by: RADIOLOGY

## 2024-06-26 PROCEDURE — 99214 OFFICE O/P EST MOD 30 MIN: CPT | Performed by: UROLOGY

## 2024-06-26 RX ORDER — DROSPIRENONE AND ETHINYL ESTRADIOL 0.03MG-3MG
1 KIT ORAL DAILY
Qty: 28 TABLET | Refills: 11 | Status: SHIPPED | OUTPATIENT
Start: 2024-06-26 | End: 2025-06-26

## 2024-06-26 NOTE — PROGRESS NOTES
Jessi Alvarado is a 24 y.o. year old female patient.  PCP = Lambert Chaudhari MD    Chief Complaint   Patient presents with    Contraception     Patient had Nexplanon removed last week and wants to discuss birth control options.        HPI   Presents to discuss contraception.  She had the Nexplanon removed last week by Dr. Holloway at the hospital.    OB History          0    Para   0    Term   0       0    AB   0    Living   0         SAB   0    IAB   0    Ectopic   0    Multiple   0    Live Births   0                 Past Medical History:   Diagnosis Date    Anxiety and depression     Asthma attack ([facility])     Attention-deficit hyperactivity disorder, predominantly hyperactive type     Attention deficit hyperactivity disorder (ADHD), predominantly hyperactive type    Concussion with no loss of consciousness 2018    Contusion of left hand 2023    Groin abscess 2023    Hand sprain, left, sequela 2023    Hematuria 2023    Left wrist pain 2023    Left wrist sprain, sequela 2023    Lump of skin 2023    Mitral valve prolapse     Numbness of fingers 2023    Other conditions influencing health status 2020    Menarche    Personal history of other diseases of the female genital tract     History of ovarian cyst    Personal history of other diseases of the respiratory system     History of asthma    PONV (postoperative nausea and vomiting)     Post-op pain 2023    Right knee pain 2023    Stiffness of right knee, not elsewhere classified 2023       Past Surgical History:   Procedure Laterality Date    KNEE SURGERY      WISDOM TOOTH EXTRACTION         Review of Systems:   Constitutional: No fever or chills  Respiratory: No shortness of breath, or cough  Cardiovascular: No chest pain or syncope  Breasts: No breast pain, no masses, no nipple discharge  Gastrointestinal: No nausea, vomiting, or diarrhea, no abdominal  pain  Genitourinary: No dysuria or frequency  Gynecology: Negative except as noted in history of present illness  All other: All other systems reviewed and negative for complaint    Medication Documentation Review Audit       Reviewed by Edouard Crisostomo MD (Physician) on 06/26/24 at 1338      Medication Order Taking? Sig Documenting Provider Last Dose Status   albuterol 2.5 mg /3 mL (0.083 %) nebulizer solution [id number] No Inhale 3 mL (2.5 mg) every 6 hours if needed for wheezing. Dirk Aguirre MD Past Week Active   albuterol 90 mcg/actuation inhaler [id number] No Inhale 2 puffs every 4 hours if needed for wheezing. Lambert Chaudhari MD Past Week Active   amitriptyline (Elavil) 10 mg tablet [id number]  Take 1 tablet (10 mg) by mouth once daily at bedtime. Emi Cox MD  Active   amphetamine-dextroamphetamine XR (Adderall XR) 20 mg 24 hr capsule [id number]  Take 1 capsule (20 mg) by mouth once daily in the morning. Do not crush or chew. Lambert Chaudhari MD  Active   ascorbic acid, vitamin C, 500 mg capsule [id number] No Take by mouth. Dirk Aguirre MD Past Month Active   Discontinued 06/20/24 0959   busPIRone (Buspar) 5 mg tablet [id number] No Take 1 tablet (5 mg) by mouth 3 times a day as needed (anxiety). Emi Cox MD 6/20/2024 Active   cholecalciferol (Vitamin D-3) 125 MCG (5000 UT) capsule [id number] No Take by mouth. Dirk Aguirre MD Past Week Active   doxycycline (Vibramycin) 100 mg capsule [id number] No Take 1 capsule (100 mg) by mouth once daily. Take with at least 8 ounces (large glass) of water, do not lie down for 30 minutes after Emi Cox MD Taking Active   elderberry fruit and flower 460-115 mg capsule [id number] No Take by mouth. Dirk Aguirre MD Past Week Active   EPINEPHrine 0.3 mg/0.3 mL injection syringe [id number] No 0.3 MILLIGRAM(S) INTRAMUSCULARLY ONCE A DAY, AS NEEDED FOR SEVERE ALLERGIC REACTION Lambert Chaudhari MD Unknown Active   famotidine  (Pepcid) 20 mg tablet [id number] No Take 1 tablet (20 mg) by mouth 2 times a day.   Patient taking differently: Take 1 tablet (20 mg) by mouth 2 times a day as needed.    Emi Cox MD Taking  24 235   ibuprofen 600 mg tablet [id number] No Take 1 tablet (600 mg) by mouth every 8 hours if needed for mild pain (1 - 3) or fever (temp greater than 38.0 C). ROMY Gomez Taking Active   ketorolac (Toradol) 10 mg tablet [id number] No Take 1 tablet (10 mg) by mouth every 6 hours if needed for moderate pain (4 - 6) or severe pain (7 - 10). Dirk Aguirre MD Past Month Active   levonorgestreL-ethinyl estrad (Aviane, Alesse, Lessina) 0.1 mg- 20 mcg tablet [id number] No Chew 1 tablet once daily. Yoly Logan, APRN-CNM, APRN-CNP, DNP Taking Active   mometasone (Asmanex) 110 mcg/ actuation (30) inhaler [id number] No Inhale 1 puff once daily. Rinse mouth with water after use to reduce aftertaste and incidence of candidiasis. Do not swallow. Emi Cox MD Past Week  24 235   omeprazole (PriLOSEC) 20 mg DR capsule [id number] No Take 1 capsule (20 mg) by mouth once daily in the morning. Take before meals. Do not crush or chew. Lambert Chaudhari MD Taking  24 235   ondansetron (Zofran) 4 mg tablet [id number]  Take 1 tablet (4 mg) by mouth every 8 hours if needed for nausea or vomiting. Idania Holloway MD  Active   ondansetron ODT (Zofran-ODT) 4 mg disintegrating tablet [id number] No Take 1 tablet (4 mg) by mouth every 8 hours if needed for nausea or vomiting for up to 20 doses. Rodrigue Washington MD Past Month Active   oxyCODONE (Roxicodone) 5 mg immediate release tablet [id number]  Take 1 tablet (5 mg) by mouth every 6 hours if needed for severe pain (7 - 10) for up to 5 doses. Idania Holloway MD  Active   pedi multivit no.17 w-fluoride (Poly-Vi-Cony) 0.5 mg tablet,chewable [id number] No Chew 1 tablet once daily. Historical Provider, MD Past Week Active   SUMAtriptan  "(Imitrex) 50 mg tablet [id number]  Take 1 tablet (50 mg) by mouth 1 time if needed for migraine. May repeat after 2 hours. Emi Cox MD  Active   zinc sulfate (Zincate) 220 (50 Zn) MG capsule [id number] No Take by mouth once daily. Historical Provider, MD Past Week Active                     Ht 1.702 m (5' 7\")   Wt 58.5 kg (129 lb)   LMP 06/18/2024 (Exact Date)   BMI 20.20 kg/m²     PHYSICAL EXAMINATION:  General: No acute distress  Eye: Intraocular movements are intact  HEENT: Normocephalic  Respiratory: Respirations are nonlabored  Gastrointestinal: Nondistended   Musculoskeletal: Normal range of motion  Neurologic: Alert and oriented x3  Psychiatric: Cooperative, appropriate mood and affect.      Problem List Items Addressed This Visit    None  Visit Diagnoses       Encounter for surveillance of contraceptive pills    -  Primary    Relevant Medications    drospirenone-ethinyl estradioL (Alisha, 28,) 3-0.03 mg tablet             Provider Impression:  1.  Contraceptive counseling  We will start Alisha for contraception.  Follow-up in 1 year or as needed.  "

## 2024-07-03 ENCOUNTER — APPOINTMENT (OUTPATIENT)
Dept: PRIMARY CARE | Facility: CLINIC | Age: 24
End: 2024-07-03
Payer: COMMERCIAL

## 2024-07-03 LAB
LABORATORY COMMENT REPORT: NORMAL
PATH REPORT.FINAL DX SPEC: NORMAL
PATH REPORT.GROSS SPEC: NORMAL
PATH REPORT.RELEVANT HX SPEC: NORMAL
PATH REPORT.TOTAL CANCER: NORMAL

## 2024-07-08 ENCOUNTER — APPOINTMENT (OUTPATIENT)
Dept: SURGERY | Facility: CLINIC | Age: 24
End: 2024-07-08
Payer: COMMERCIAL

## 2024-07-08 ENCOUNTER — TELEPHONE (OUTPATIENT)
Dept: OBSTETRICS AND GYNECOLOGY | Facility: CLINIC | Age: 24
End: 2024-07-08

## 2024-07-08 NOTE — TELEPHONE ENCOUNTER
Patient called and left a message stating she is having heavy vaginal bleeding from changing from the Nexplanon to Alisha. Patient had the Nexplanon removed 6/20/24 by Dr. Holloway. Spoke with Dr. Crisostomo and he stated she could have irregular bleeding and to give it 2 cycles, pt to try Ibuprofen 800mg every 8 hours. Returned patient's call and left a message to call the office.

## 2024-07-09 ENCOUNTER — OFFICE VISIT (OUTPATIENT)
Dept: SURGERY | Facility: CLINIC | Age: 24
End: 2024-07-09
Payer: COMMERCIAL

## 2024-07-09 VITALS
SYSTOLIC BLOOD PRESSURE: 98 MMHG | DIASTOLIC BLOOD PRESSURE: 60 MMHG | BODY MASS INDEX: 20.25 KG/M2 | HEART RATE: 88 BPM | HEIGHT: 67 IN | WEIGHT: 129 LBS

## 2024-07-09 DIAGNOSIS — Z09 POSTOPERATIVE EXAMINATION: Primary | ICD-10-CM

## 2024-07-09 PROCEDURE — 99024 POSTOP FOLLOW-UP VISIT: CPT | Performed by: SURGERY

## 2024-07-09 PROCEDURE — 1036F TOBACCO NON-USER: CPT | Performed by: SURGERY

## 2024-07-09 NOTE — PROGRESS NOTES
Subjective   Patient ID: Jessi Alvarado is a 24 y.o. female who presents for Post-op (Post OP #1 Right Nexplanon removal 6-20-24.  C/O itching of the incision area, denies any pain, swelling, discharge, fever, nausea or chills. ).  The numbness has since resolved.  Mid incision she thinks a stitch is coming out.  She is on the pill for birth control  HPI    Review of Systems  She denies any chest pain shortness of breath    Objective   Physical Exam  Surgical incision is healing nicely.  There is no cellulitis.  There is a healing ridge.  Distally I think she is trying to spit a suture but I cannot recheck.  Mid incision she is definitely spitting 1 this is grasped and retracted and then cut.  Dressing was applied.  Assessment/Plan     Status post excision of birth control.  Doing well.  Wound care is discussed.  She will return to clinic on a as needed basis.       Idania Holloway MD 07/09/24 9:10 AM

## 2024-07-09 NOTE — PROGRESS NOTES
Patient did not want to schedule for her ESWL at this time, reminder appointment made for 3 months with bunny JAMESON

## 2024-07-10 ENCOUNTER — APPOINTMENT (OUTPATIENT)
Dept: UROLOGY | Facility: CLINIC | Age: 24
End: 2024-07-10
Payer: COMMERCIAL

## 2024-07-16 ENCOUNTER — APPOINTMENT (OUTPATIENT)
Dept: PRIMARY CARE | Facility: CLINIC | Age: 24
End: 2024-07-16
Payer: COMMERCIAL

## 2024-07-22 ENCOUNTER — APPOINTMENT (OUTPATIENT)
Dept: PRIMARY CARE | Facility: CLINIC | Age: 24
End: 2024-07-22
Payer: COMMERCIAL

## 2024-07-24 ENCOUNTER — TELEPHONE (OUTPATIENT)
Dept: PRIMARY CARE | Facility: CLINIC | Age: 24
End: 2024-07-24

## 2024-07-24 ENCOUNTER — OFFICE VISIT (OUTPATIENT)
Dept: PRIMARY CARE | Facility: CLINIC | Age: 24
End: 2024-07-24
Payer: COMMERCIAL

## 2024-07-24 VITALS
DIASTOLIC BLOOD PRESSURE: 71 MMHG | HEIGHT: 67 IN | BODY MASS INDEX: 18.68 KG/M2 | WEIGHT: 119 LBS | SYSTOLIC BLOOD PRESSURE: 104 MMHG | HEART RATE: 118 BPM

## 2024-07-24 DIAGNOSIS — G47.10 HYPERSOMNIA: ICD-10-CM

## 2024-07-24 DIAGNOSIS — F51.5 BAD DREAMS: ICD-10-CM

## 2024-07-24 DIAGNOSIS — R00.0 TACHYCARDIA: ICD-10-CM

## 2024-07-24 DIAGNOSIS — N92.1 MENORRHAGIA WITH IRREGULAR CYCLE: ICD-10-CM

## 2024-07-24 DIAGNOSIS — R00.2 PALPITATIONS: Primary | ICD-10-CM

## 2024-07-24 DIAGNOSIS — J45.30 MILD PERSISTENT ASTHMA WITHOUT COMPLICATION (HHS-HCC): ICD-10-CM

## 2024-07-24 DIAGNOSIS — R63.4 WEIGHT LOSS: ICD-10-CM

## 2024-07-24 DIAGNOSIS — R06.83 SNORING: ICD-10-CM

## 2024-07-24 DIAGNOSIS — R05.2 SUBACUTE COUGH: ICD-10-CM

## 2024-07-24 DIAGNOSIS — R07.89 ATYPICAL CHEST PAIN: ICD-10-CM

## 2024-07-24 DIAGNOSIS — R53.83 FATIGUE, UNSPECIFIED TYPE: ICD-10-CM

## 2024-07-24 DIAGNOSIS — R06.02 SOB (SHORTNESS OF BREATH): ICD-10-CM

## 2024-07-24 PROBLEM — J45.20 MILD INTERMITTENT ASTHMA WITHOUT COMPLICATION (HHS-HCC): Status: RESOLVED | Noted: 2020-07-13 | Resolved: 2024-07-24

## 2024-07-24 PROCEDURE — 93000 ELECTROCARDIOGRAM COMPLETE: CPT | Performed by: INTERNAL MEDICINE

## 2024-07-24 PROCEDURE — 1036F TOBACCO NON-USER: CPT | Performed by: INTERNAL MEDICINE

## 2024-07-24 PROCEDURE — 3008F BODY MASS INDEX DOCD: CPT | Performed by: INTERNAL MEDICINE

## 2024-07-24 PROCEDURE — 99215 OFFICE O/P EST HI 40 MIN: CPT | Performed by: INTERNAL MEDICINE

## 2024-07-24 RX ORDER — PREDNISONE 5 MG/1
5 TABLET ORAL DAILY
Qty: 7 TABLET | Refills: 0 | Status: SHIPPED | OUTPATIENT
Start: 2024-07-24 | End: 2024-07-31

## 2024-07-24 ASSESSMENT — ENCOUNTER SYMPTOMS
EYES NEGATIVE: 1
GASTROINTESTINAL NEGATIVE: 1
WHEEZING: 0
BACK PAIN: 0
FATIGUE: 1
NAUSEA: 0
NEUROLOGICAL NEGATIVE: 1
PALPITATIONS: 1
RHINORRHEA: 0
AGITATION: 0
CONSTIPATION: 0
FEVER: 0
ABDOMINAL DISTENTION: 0
LIGHT-HEADEDNESS: 0
SHORTNESS OF BREATH: 1
WEAKNESS: 0
CHILLS: 0
ENDOCRINE NEGATIVE: 1
ABDOMINAL PAIN: 0
DIARRHEA: 0
DYSURIA: 0
HEADACHES: 0
COUGH: 1
MUSCULOSKELETAL NEGATIVE: 1
DIZZINESS: 0
VOMITING: 0
PSYCHIATRIC NEGATIVE: 1

## 2024-07-24 NOTE — PROGRESS NOTES
Subjective   Patient ID: Jessi Alvarado is a 24 y.o. female who presents for Follow-up (C/O PALPITATIONS AND NUMBNESS IN RT ARM, SOB AND LOSS OF APPETITE ).  HPI  SNORING , PALPITATIONS , HYPERSOMNIA , BAD DREAMS , B/L CHEST PAIN 3/10 ON AND OFF X SEVERAL WEEKS . CHEST PAIN LASTS LESS THAN 5 MINUETS ON AND OFF WITH SOB W/O RADIATIONS . FEELS HEART BEAT FLUCTUATES A LOT DURING THE DAY. FATIGUE. PERIODIC LIMB MOVEMENT DURING SLEEP , WEIGHT LOSS WITH POOR APPETITE. DRY COUGH X 2-3 WEEKS WITH NEGATIVE HOME COVID TEST. MENORRHAGIA  WITH IRREGULAR CYCLE, IS ON OCP BY GYN. NEEDS A NOTE FOR WORK.  Review of Systems   Constitutional:  Positive for fatigue. Negative for chills and fever.        WEIGHT LOSS, POOR APPETITE   HENT: Negative.  Negative for congestion, postnasal drip and rhinorrhea.    Eyes: Negative.  Negative for visual disturbance.   Respiratory:  Positive for cough and shortness of breath. Negative for wheezing.         HYPERSOMNIA   Cardiovascular:  Positive for chest pain and palpitations. Negative for leg swelling.   Gastrointestinal: Negative.  Negative for abdominal distention, abdominal pain, constipation, diarrhea, nausea and vomiting.   Endocrine: Negative.    Genitourinary:  Negative for dysuria and urgency.        MENORRHAGIA   Musculoskeletal: Negative.  Negative for back pain.   Skin: Negative.  Negative for rash.   Allergic/Immunologic: Negative for immunocompromised state.   Neurological: Negative.  Negative for dizziness, weakness, light-headedness and headaches.   Psychiatric/Behavioral: Negative.  Negative for agitation.        Objective   Physical Exam  Constitutional:       General: She is not in acute distress.  HENT:      Head: Normocephalic.      Nose: Nose normal.      Mouth/Throat:      Mouth: Mucous membranes are moist.   Eyes:      Conjunctiva/sclera: Conjunctivae normal.      Pupils: Pupils are equal, round, and reactive to light.   Cardiovascular:      Rate and Rhythm: Regular  rhythm. Tachycardia present.      Pulses: Normal pulses.      Heart sounds: Normal heart sounds.   Pulmonary:      Effort: No respiratory distress.      Breath sounds: No wheezing.   Chest:      Chest wall: No tenderness.   Abdominal:      General: Abdomen is flat. Bowel sounds are normal.      Palpations: Abdomen is soft.      Tenderness: There is no abdominal tenderness.   Musculoskeletal:         General: No tenderness. Normal range of motion.      Cervical back: Normal range of motion.   Lymphadenopathy:      Cervical: No cervical adenopathy.   Skin:     General: Skin is warm and dry.      Findings: No rash.   Neurological:      General: No focal deficit present.      Mental Status: She is alert. Mental status is at baseline.   Psychiatric:         Mood and Affect: Mood normal.         Behavior: Behavior normal.         Assessment/Plan   1. Palpitations  ECG 12 Lead    Holter Or Event Cardiac Monitor    Nuclear Stress Test    TSH with reflex to Free T4 if abnormal      2. Tachycardia  ECG 12 Lead      3. Hypersomnia  In-Center Sleep Study      4. Snoring  In-Center Sleep Study      5. SOB (shortness of breath)  Holter Or Event Cardiac Monitor    XR chest 2 views    Nuclear Stress Test    predniSONE (Deltasone) 5 mg tablet      6. Atypical chest pain  XR chest 2 views    Nuclear Stress Test      7. Subacute cough  predniSONE (Deltasone) 5 mg tablet      8. Mild persistent asthma without complication ([facility])  predniSONE (Deltasone) 5 mg tablet      9. Fatigue, unspecified type  Comprehensive Metabolic Panel    CBC and Auto Differential    Magnesium    Zinc      10. Bad dreams        11. Menorrhagia with irregular cycle        12. Weight loss          EKG IS DISCUSSED .EXPLAINED THE NEED FOR MORE CARDIAC WORKUP.PT CAN'T RUN ON TREADMILL BECAUSE OF HAVING SOB, WILL DO LAKE SCAN STRESS TEST.  ADVISED FOR RELAXATION TECHNIQUES AND TO CUT DOWN ON CAFFEINE, FALL PRECAUTION.  ADVISED TO HAVE LOW FAT AND LOW CALORIE  DIET, TO AVOID SKIPPING MEALS.  ADVISED TO GO TO ER IF CHEST PAIN GETS WORSE, TO AVOID TAKING NAPS DURING THE DAYTIME . EXPLAINED THE NEED FOR SLEEP STUDY.    MDM    1) COMPLEXITY: 1 ACUTE OR CHRONIC ILLNESS OR INJURY THAT POSES THREAT TO LIFE OR BODILY FUNCTION.  2)DATA: TESTS INTERPRETED AND OR ORDERED, TOOK INDEPENDENT HISTORY OR RECORDS REVIEWED, CASE DISCUSSED WITH ANOTHER PROVIDER  3)RISK: HIGH RISK DUE TO NATURE OF MEDICAL CONDITIONS/COMORBIDITY OR MEDICATIONS ORDERED OR SURGICAL OR PROCEDURE REFERRAL, OR REFERRED TO HOSPITAL .     HAS F/U.   NEED A NOTE FOR WORK MENTIONING  SHE HAS CHEST PAIN AND SOB ,PALPITATIONS, HYPERSOMNIA  AND IS SUPPOSED TO HAVE HEART MONITOR , STRESS TEST , SLEEP STUDY , LABS AND XRAYS.

## 2024-07-25 NOTE — TELEPHONE ENCOUNTER
NOT ELIGIBLE FOR IN LAB SLEEP STUDY BUT WILL APPROVE HSS  MESSAGE INTO [facility] TO SEE IF SHE WOULD LIKE TO CHANGE ORDER

## 2024-07-26 ENCOUNTER — LAB (OUTPATIENT)
Dept: LAB | Facility: LAB | Age: 24
End: 2024-07-26
Payer: COMMERCIAL

## 2024-07-26 ENCOUNTER — HOSPITAL ENCOUNTER (OUTPATIENT)
Dept: RADIOLOGY | Facility: HOSPITAL | Age: 24
Discharge: HOME | End: 2024-07-26
Payer: COMMERCIAL

## 2024-07-26 ENCOUNTER — HOSPITAL ENCOUNTER (OUTPATIENT)
Dept: CARDIOLOGY | Facility: HOSPITAL | Age: 24
Discharge: HOME | End: 2024-07-26
Payer: COMMERCIAL

## 2024-07-26 DIAGNOSIS — R07.89 ATYPICAL CHEST PAIN: ICD-10-CM

## 2024-07-26 DIAGNOSIS — R00.2 PALPITATIONS: ICD-10-CM

## 2024-07-26 DIAGNOSIS — R53.83 FATIGUE, UNSPECIFIED TYPE: ICD-10-CM

## 2024-07-26 DIAGNOSIS — R06.02 SOB (SHORTNESS OF BREATH): ICD-10-CM

## 2024-07-26 LAB
ALBUMIN SERPL BCP-MCNC: 4.6 G/DL (ref 3.4–5)
ALP SERPL-CCNC: 44 U/L (ref 33–110)
ALT SERPL W P-5'-P-CCNC: 9 U/L (ref 7–45)
ANION GAP SERPL CALC-SCNC: 11 MMOL/L (ref 10–20)
AST SERPL W P-5'-P-CCNC: 13 U/L (ref 9–39)
BASOPHILS # BLD AUTO: 0.01 X10*3/UL (ref 0–0.1)
BASOPHILS NFR BLD AUTO: 0.2 %
BILIRUB SERPL-MCNC: 1 MG/DL (ref 0–1.2)
BUN SERPL-MCNC: 8 MG/DL (ref 6–23)
CALCIUM SERPL-MCNC: 8.9 MG/DL (ref 8.6–10.3)
CHLORIDE SERPL-SCNC: 106 MMOL/L (ref 98–107)
CO2 SERPL-SCNC: 27 MMOL/L (ref 21–32)
CREAT SERPL-MCNC: 0.78 MG/DL (ref 0.5–1.05)
EGFRCR SERPLBLD CKD-EPI 2021: >90 ML/MIN/1.73M*2
EOSINOPHIL # BLD AUTO: 0.07 X10*3/UL (ref 0–0.7)
EOSINOPHIL NFR BLD AUTO: 1.3 %
ERYTHROCYTE [DISTWIDTH] IN BLOOD BY AUTOMATED COUNT: 11.6 % (ref 11.5–14.5)
GLUCOSE SERPL-MCNC: 82 MG/DL (ref 74–99)
HCT VFR BLD AUTO: 39.1 % (ref 36–46)
HGB BLD-MCNC: 13 G/DL (ref 12–16)
IMM GRANULOCYTES # BLD AUTO: 0.01 X10*3/UL (ref 0–0.7)
IMM GRANULOCYTES NFR BLD AUTO: 0.2 % (ref 0–0.9)
LYMPHOCYTES # BLD AUTO: 1.88 X10*3/UL (ref 1.2–4.8)
LYMPHOCYTES NFR BLD AUTO: 34.2 %
MAGNESIUM SERPL-MCNC: 2.21 MG/DL (ref 1.6–2.4)
MCH RBC QN AUTO: 30.9 PG (ref 26–34)
MCHC RBC AUTO-ENTMCNC: 33.2 G/DL (ref 32–36)
MCV RBC AUTO: 93 FL (ref 80–100)
MONOCYTES # BLD AUTO: 0.48 X10*3/UL (ref 0.1–1)
MONOCYTES NFR BLD AUTO: 8.7 %
NEUTROPHILS # BLD AUTO: 3.04 X10*3/UL (ref 1.2–7.7)
NEUTROPHILS NFR BLD AUTO: 55.4 %
NRBC BLD-RTO: 0 /100 WBCS (ref 0–0)
PLATELET # BLD AUTO: 252 X10*3/UL (ref 150–450)
POTASSIUM SERPL-SCNC: 3.7 MMOL/L (ref 3.5–5.3)
PROT SERPL-MCNC: 6.8 G/DL (ref 6.4–8.2)
RBC # BLD AUTO: 4.21 X10*6/UL (ref 4–5.2)
SODIUM SERPL-SCNC: 140 MMOL/L (ref 136–145)
TSH SERPL-ACNC: 2.09 MIU/L (ref 0.44–3.98)
WBC # BLD AUTO: 5.5 X10*3/UL (ref 4.4–11.3)

## 2024-07-26 PROCEDURE — 84630 ASSAY OF ZINC: CPT

## 2024-07-26 PROCEDURE — 85025 COMPLETE CBC W/AUTO DIFF WBC: CPT

## 2024-07-26 PROCEDURE — 84443 ASSAY THYROID STIM HORMONE: CPT

## 2024-07-26 PROCEDURE — 80053 COMPREHEN METABOLIC PANEL: CPT

## 2024-07-26 PROCEDURE — 93242 EXT ECG>48HR<7D RECORDING: CPT

## 2024-07-26 PROCEDURE — 83735 ASSAY OF MAGNESIUM: CPT

## 2024-07-26 PROCEDURE — 71046 X-RAY EXAM CHEST 2 VIEWS: CPT

## 2024-07-26 PROCEDURE — 71046 X-RAY EXAM CHEST 2 VIEWS: CPT | Performed by: RADIOLOGY

## 2024-07-26 PROCEDURE — 36415 COLL VENOUS BLD VENIPUNCTURE: CPT

## 2024-07-26 NOTE — TELEPHONE ENCOUNTER
STRESS TEST DENIED  CERTAIN ABNORMAL FINDINGS ON EKG MUST BE PRESENT THAT WOULD CAUSE A TREADMILL STRESS TEST TO BE UNCLEAR NOTES DO NOT SHOW THIS WITH EKG THEREFOR DOESN'T MEET MEDICAL NECESSITY  ADVISE?

## 2024-07-28 LAB — ZINC SERPL-MCNC: 89.3 UG/DL (ref 60–120)

## 2024-07-30 ENCOUNTER — TELEPHONE (OUTPATIENT)
Dept: PRIMARY CARE | Facility: CLINIC | Age: 24
End: 2024-07-30

## 2024-07-30 ENCOUNTER — APPOINTMENT (OUTPATIENT)
Dept: OBSTETRICS AND GYNECOLOGY | Facility: CLINIC | Age: 24
End: 2024-07-30
Payer: COMMERCIAL

## 2024-07-30 DIAGNOSIS — R07.89 ATYPICAL CHEST PAIN: Primary | ICD-10-CM

## 2024-07-30 NOTE — TELEPHONE ENCOUNTER
REGARDING THE STRESS TEST , PLEASE CHECK WITH HER AGAIN IF SHE CAN DO THE TREADMILL SINCE INSURANCE DOESN'T COVER THE LAKE. IF NOT , LET ME KNOW TO REFER HER TO CARDIOLOGIST.

## 2024-08-02 ENCOUNTER — CLINICAL SUPPORT (OUTPATIENT)
Dept: SLEEP MEDICINE | Facility: HOSPITAL | Age: 24
End: 2024-08-02
Payer: COMMERCIAL

## 2024-08-02 VITALS — HEIGHT: 67 IN | WEIGHT: 119 LBS | BODY MASS INDEX: 18.68 KG/M2

## 2024-08-02 DIAGNOSIS — R53.83 FATIGUE, UNSPECIFIED TYPE: ICD-10-CM

## 2024-08-02 DIAGNOSIS — R06.83 SNORING: ICD-10-CM

## 2024-08-02 DIAGNOSIS — G47.10 HYPERSOMNIA: ICD-10-CM

## 2024-08-02 PROCEDURE — 9420000001 HC RT PATIENT EDUCATION 5 MIN

## 2024-08-02 NOTE — PROGRESS NOTES
[facility] TECH NOTE:     Patient: Jessi Alvarado   MRN//AGE: [id number]  2000  24 y.o.   Technologist: Rhonda Mccloud RRT-SDS   Room: Home Sleep Study_Nomad   Service Date: 2024        Sleep Testing Location: [place] [address]      [place]: 15    TECHNOLOGIST SLEEP STUDY PROCEDURE NOTE:   This sleep study is being conducted according to the policies and procedures outlined by the AAS accreditation standards.  The sleep study procedure and processes involved during this appointment was explained to the patient/patient’s family, questions were answered. The patient/family verbalized understanding.      The patient is a 24 y.o. year old female scheduled for a Home Sleep Apnea Test.    The full study Was completed.  Patient questionnaires completed?: yes     Consents signed? yes    Initial Fall Risk Screening:     Jessi has not fallen in the last 6 months. She did not result in injury. Jessi does have a fear of falling. She does not need assistance with sitting, standing, or walking.  She does not need assistance walking in her home.  She does not need assistance in an unfamiliar setting. The patient is not using an assistive device.     Brief Study observations:     Deviation to order/protocol and reason:      If PAP, which was preferred mask/pressure/mode:     Other:The patient received equipment and instructions for use of the [employer] Nomad HSAT device. The patient was instructed how to apply the effort belts, cannula, thermistor. It was also explained how the Nomad and oximeter components work.       The patient was informed of their responsibility for the device and acknowledged this by signing the HSAT device contract. The patient was asked to return the device on the next day and was shown where the drop off box was located.     After the procedure, the patient/family was informed to followup with ordering clinician for testing results.    After the procedure, the  patient/family was informed to ensure followup with ordering clinician for testing results.      Technologist: Rhonda Mccloud, SALOMÓN-SDS

## 2024-08-05 ENCOUNTER — APPOINTMENT (OUTPATIENT)
Dept: PRIMARY CARE | Facility: CLINIC | Age: 24
End: 2024-08-05
Payer: COMMERCIAL

## 2024-08-05 VITALS
SYSTOLIC BLOOD PRESSURE: 108 MMHG | BODY MASS INDEX: 18.83 KG/M2 | HEIGHT: 67 IN | WEIGHT: 120 LBS | DIASTOLIC BLOOD PRESSURE: 73 MMHG | HEART RATE: 120 BPM

## 2024-08-05 DIAGNOSIS — J45.30 MILD PERSISTENT ASTHMA WITHOUT COMPLICATION (HHS-HCC): ICD-10-CM

## 2024-08-05 DIAGNOSIS — F90.2 ATTENTION DEFICIT HYPERACTIVITY DISORDER (ADHD), COMBINED TYPE: ICD-10-CM

## 2024-08-05 PROCEDURE — 3008F BODY MASS INDEX DOCD: CPT | Performed by: INTERNAL MEDICINE

## 2024-08-05 PROCEDURE — 99213 OFFICE O/P EST LOW 20 MIN: CPT | Performed by: INTERNAL MEDICINE

## 2024-08-05 PROCEDURE — 1036F TOBACCO NON-USER: CPT | Performed by: INTERNAL MEDICINE

## 2024-08-05 RX ORDER — DEXTROAMPHETAMINE SACCHARATE, AMPHETAMINE ASPARTATE MONOHYDRATE, DEXTROAMPHETAMINE SULFATE AND AMPHETAMINE SULFATE 5; 5; 5; 5 MG/1; MG/1; MG/1; MG/1
20 CAPSULE, EXTENDED RELEASE ORAL EVERY MORNING
Qty: 30 CAPSULE | Refills: 0 | Status: SHIPPED | OUTPATIENT
Start: 2024-08-05 | End: 2024-09-04

## 2024-08-05 ASSESSMENT — ENCOUNTER SYMPTOMS
COUGH: 0
RESPIRATORY NEGATIVE: 1
LIGHT-HEADEDNESS: 0
BACK PAIN: 0
EYE DISCHARGE: 0
ADENOPATHY: 0
SHORTNESS OF BREATH: 0
CARDIOVASCULAR NEGATIVE: 1
CONSTITUTIONAL NEGATIVE: 1
DYSURIA: 0
HEMATOLOGIC/LYMPHATIC NEGATIVE: 1
VOMITING: 0
ABDOMINAL DISTENTION: 0
JOINT SWELLING: 0
ENDOCRINE NEGATIVE: 1
AGITATION: 0
NUMBNESS: 0
CONSTIPATION: 0
HEADACHES: 0
EYES NEGATIVE: 1
ABDOMINAL PAIN: 0
GASTROINTESTINAL NEGATIVE: 1
ALLERGIC/IMMUNOLOGIC NEGATIVE: 1
FEVER: 0
DIARRHEA: 0
NECK STIFFNESS: 0
MUSCULOSKELETAL NEGATIVE: 1
CONFUSION: 0
PSYCHIATRIC NEGATIVE: 1
CHILLS: 0
NAUSEA: 0
PALPITATIONS: 0
WHEEZING: 0
NEUROLOGICAL NEGATIVE: 1

## 2024-08-05 ASSESSMENT — PATIENT HEALTH QUESTIONNAIRE - PHQ9
SUM OF ALL RESPONSES TO PHQ9 QUESTIONS 1 AND 2: 0
1. LITTLE INTEREST OR PLEASURE IN DOING THINGS: NOT AT ALL
2. FEELING DOWN, DEPRESSED OR HOPELESS: NOT AT ALL

## 2024-08-05 NOTE — PROGRESS NOTES
Subjective   Patient ID: Jessi Alvarado is a 24 y.o. female who presents for Follow-up (Adderall refill and paperwork).  Needs a refilol for Adderall    And medication use paper work for school    Adderall helps a lot, no side effects        Review of Systems   Constitutional: Negative.  Negative for chills and fever.   HENT: Negative.  Negative for congestion.    Eyes: Negative.  Negative for discharge.   Respiratory: Negative.  Negative for cough, shortness of breath and wheezing.    Cardiovascular: Negative.  Negative for chest pain, palpitations and leg swelling.   Gastrointestinal: Negative.  Negative for abdominal distention, abdominal pain, constipation, diarrhea, nausea and vomiting.   Endocrine: Negative.    Genitourinary: Negative.  Negative for dysuria and urgency.   Musculoskeletal: Negative.  Negative for back pain, joint swelling and neck stiffness.   Skin: Negative.  Negative for rash.   Allergic/Immunologic: Negative.  Negative for immunocompromised state.   Neurological: Negative.  Negative for light-headedness, numbness and headaches.   Hematological: Negative.  Negative for adenopathy.   Psychiatric/Behavioral: Negative.  Negative for agitation, behavioral problems and confusion.    All other systems reviewed and are negative.      Objective   Physical Exam  Vitals reviewed.   Constitutional:       General: She is not in acute distress.     Appearance: Normal appearance.   HENT:      Head: Normocephalic and atraumatic.      Nose: Nose normal.   Eyes:      Conjunctiva/sclera: Conjunctivae normal.      Pupils: Pupils are equal, round, and reactive to light.   Neck:      Vascular: No carotid bruit.   Cardiovascular:      Rate and Rhythm: Normal rate and regular rhythm.      Pulses: Normal pulses.      Heart sounds:      No gallop.   Pulmonary:      Effort: Pulmonary effort is normal. No respiratory distress.      Breath sounds: Normal breath sounds. No wheezing.   Abdominal:      General: Bowel  "sounds are normal.      Palpations: Abdomen is soft.      Tenderness: There is no abdominal tenderness.   Musculoskeletal:         General: Normal range of motion.      Cervical back: Normal range of motion. No rigidity.   Lymphadenopathy:      Cervical: No cervical adenopathy.   Skin:     General: Skin is warm.      Findings: No rash.   Neurological:      General: No focal deficit present.      Mental Status: She is alert and oriented to person, place, and time.   Psychiatric:         Mood and Affect: Mood normal.         Behavior: Behavior normal.       /73 (BP Location: Left arm, Patient Position: Sitting)   Pulse (!) 120   Ht 1.702 m (5' 7\")   Wt 54.4 kg (120 lb)   BMI 18.79 kg/m²    Hemoglobin A1C   Date/Time Value Ref Range Status   09/08/2023 09:32 AM 5.4 % Final     Comment:          Diagnosis of Diabetes-Adults   Non-Diabetic: < or = 5.6%   Increased risk for developing diabetes: 5.7-6.4%   Diagnostic of diabetes: > or = 6.5%  .       Monitoring of Diabetes                Age (y)     Therapeutic Goal (%)   Adults:          >18           <7.0   Pediatrics:    13-18           <7.5                   7-12           <8.0                   0- 6            7.5-8.5   American Diabetes Association. Diabetes Care 33(S1), Jan 2010.     Assessment/Plan   Problem List Items Addressed This Visit       ADHD (attention deficit hyperactivity disorder)    Relevant Medications    amphetamine-dextroamphetamine XR (Adderall XR) 20 mg 24 hr capsule    Mild persistent asthma without complication ([facility])    Relevant Medications    mometasone (Asmanex) 110 mcg/ actuation (30) inhaler        OARRS HAS BEEN REVIEWED AND IS CONSISTENT WITH PRESCRIBED MEDICATIONS, CONSIDERED THE RISK OF ABUSE, DEPENDENCE, ADDICTION AND DIVERSION, MEDICATION IS FELT TO BE CLINICALLY APPROPRIATE ON THE DOCUMENTED DIAGNOSIS    School form done    Fu 1 mo  "

## 2024-08-06 ENCOUNTER — OUTSIDE PROCEDURE (OUTPATIENT)
Dept: PRIMARY CARE | Facility: CLINIC | Age: 24
End: 2024-08-06

## 2024-08-06 ENCOUNTER — APPOINTMENT (OUTPATIENT)
Dept: PRIMARY CARE | Facility: CLINIC | Age: 24
End: 2024-08-06
Payer: COMMERCIAL

## 2024-08-06 DIAGNOSIS — R00.2 PALPITATIONS: Primary | ICD-10-CM

## 2024-08-06 PROCEDURE — 93244 EXT ECG>48HR<7D REV&INTERPJ: CPT | Performed by: INTERNAL MEDICINE

## 2024-08-06 NOTE — TELEPHONE ENCOUNTER
V/M LEFT WITH PATIENT ON IF SHE HAS MADE A DECISION ON WHICH WAY TO GO STRESS TEST, REFERRAL OR COME IN AND DISCUSS WITH PROVIDER PRIOR TO DECIDING.

## 2024-08-08 NOTE — TELEPHONE ENCOUNTER
LETTER SENT TO PATIENT THROUGH THE PORTAL TO SEE IF SHE HAS COME TO A DECISION ON WHAT SHE WOULD LIKE TO DO.

## 2024-08-09 ENCOUNTER — OFFICE VISIT (OUTPATIENT)
Dept: SURGERY | Facility: CLINIC | Age: 24
End: 2024-08-09
Payer: COMMERCIAL

## 2024-08-09 ENCOUNTER — APPOINTMENT (OUTPATIENT)
Dept: OBSTETRICS AND GYNECOLOGY | Facility: CLINIC | Age: 24
End: 2024-08-09
Payer: COMMERCIAL

## 2024-08-09 VITALS
WEIGHT: 120 LBS | HEIGHT: 67 IN | DIASTOLIC BLOOD PRESSURE: 76 MMHG | HEART RATE: 70 BPM | SYSTOLIC BLOOD PRESSURE: 118 MMHG | BODY MASS INDEX: 18.83 KG/M2

## 2024-08-09 DIAGNOSIS — Z09 POSTOPERATIVE EXAMINATION: Primary | ICD-10-CM

## 2024-08-09 PROCEDURE — 99024 POSTOP FOLLOW-UP VISIT: CPT | Performed by: SURGERY

## 2024-08-09 PROCEDURE — 1036F TOBACCO NON-USER: CPT | Performed by: SURGERY

## 2024-08-09 PROCEDURE — 3008F BODY MASS INDEX DOCD: CPT | Performed by: SURGERY

## 2024-08-09 NOTE — PROGRESS NOTES
Subjective   Patient ID: Jessi Alvarado is a 24 y.o. female who presents for concern of spitting stitch from incision.  She states she felt some spiky and was flicking it and then on the way and she thinks it came out.  Objective   Incision is well-healed without cellulitis.  The previous stitch removal site is this very small eschar.  The distal 1 shows evidence of where this probably would come out.  That would explain the discomfort she had there for a week or so.    Assessment/Plan   Wound care and was discussed.  She is encouraged to return if any further sutures begin to work their way through the skin.         Idania Holloway MD 08/09/24 7:58 PM

## 2024-08-13 NOTE — TELEPHONE ENCOUNTER
NOTIFIED PATIENT OF RESULTS IN AND CAN CALL OFFICE IF WOULD LIKE TO MOVE CURRENT APPIONTMENT UP TO DISCUSS NEXT STEPS.

## 2024-08-20 ENCOUNTER — APPOINTMENT (OUTPATIENT)
Dept: PRIMARY CARE | Facility: CLINIC | Age: 24
End: 2024-08-20
Payer: COMMERCIAL

## 2024-08-20 ENCOUNTER — LAB (OUTPATIENT)
Dept: LAB | Facility: LAB | Age: 24
End: 2024-08-20
Payer: COMMERCIAL

## 2024-08-20 ENCOUNTER — OFFICE VISIT (OUTPATIENT)
Dept: PRIMARY CARE | Facility: CLINIC | Age: 24
End: 2024-08-20
Payer: COMMERCIAL

## 2024-08-20 VITALS
SYSTOLIC BLOOD PRESSURE: 110 MMHG | HEART RATE: 102 BPM | BODY MASS INDEX: 18.68 KG/M2 | HEIGHT: 67 IN | DIASTOLIC BLOOD PRESSURE: 74 MMHG | WEIGHT: 119 LBS

## 2024-08-20 DIAGNOSIS — Z00.00 HEALTHCARE MAINTENANCE: ICD-10-CM

## 2024-08-20 DIAGNOSIS — R00.2 PALPITATIONS: ICD-10-CM

## 2024-08-20 DIAGNOSIS — R00.0 TACHYCARDIA: Primary | ICD-10-CM

## 2024-08-20 LAB
HBV CORE AB SER QL: NONREACTIVE
HBV SURFACE AB SER-ACNC: 8.4 MIU/ML
HBV SURFACE AG SERPL QL IA: NONREACTIVE

## 2024-08-20 PROCEDURE — 36415 COLL VENOUS BLD VENIPUNCTURE: CPT

## 2024-08-20 PROCEDURE — 86706 HEP B SURFACE ANTIBODY: CPT

## 2024-08-20 PROCEDURE — 3008F BODY MASS INDEX DOCD: CPT | Performed by: INTERNAL MEDICINE

## 2024-08-20 PROCEDURE — 87340 HEPATITIS B SURFACE AG IA: CPT

## 2024-08-20 PROCEDURE — 86704 HEP B CORE ANTIBODY TOTAL: CPT

## 2024-08-20 PROCEDURE — 99214 OFFICE O/P EST MOD 30 MIN: CPT | Performed by: INTERNAL MEDICINE

## 2024-08-20 PROCEDURE — 1036F TOBACCO NON-USER: CPT | Performed by: INTERNAL MEDICINE

## 2024-08-20 RX ORDER — METOPROLOL SUCCINATE 25 MG/1
25 TABLET, EXTENDED RELEASE ORAL DAILY
Qty: 30 TABLET | Refills: 11 | Status: SHIPPED | OUTPATIENT
Start: 2024-08-20 | End: 2025-08-20

## 2024-08-20 ASSESSMENT — ENCOUNTER SYMPTOMS
EYES NEGATIVE: 1
CONFUSION: 0
WHEEZING: 0
CONSTIPATION: 0
EYE DISCHARGE: 0
NUMBNESS: 0
DYSURIA: 0
HEMATOLOGIC/LYMPHATIC NEGATIVE: 1
COUGH: 0
GASTROINTESTINAL NEGATIVE: 1
HEADACHES: 0
ABDOMINAL PAIN: 0
JOINT SWELLING: 0
DIARRHEA: 0
ENDOCRINE NEGATIVE: 1
LIGHT-HEADEDNESS: 0
MUSCULOSKELETAL NEGATIVE: 1
ABDOMINAL DISTENTION: 0
PALPITATIONS: 0
BACK PAIN: 0
CONSTITUTIONAL NEGATIVE: 1
VOMITING: 0
CARDIOVASCULAR NEGATIVE: 1
NAUSEA: 0
FEVER: 0
PSYCHIATRIC NEGATIVE: 1
NEUROLOGICAL NEGATIVE: 1
SHORTNESS OF BREATH: 0
AGITATION: 0
NECK STIFFNESS: 0
ALLERGIC/IMMUNOLOGIC NEGATIVE: 1
RESPIRATORY NEGATIVE: 1
CHILLS: 0
ADENOPATHY: 0

## 2024-08-20 NOTE — PROGRESS NOTES
Subjective   Patient ID: Jessi Alvarado is a 24 y.o. female who presents for Follow-up (Fu holter and sleep study).  Here for fu after holter and sleep study        Review of Systems   Constitutional: Negative.  Negative for chills and fever.   HENT: Negative.  Negative for congestion.    Eyes: Negative.  Negative for discharge.   Respiratory: Negative.  Negative for cough, shortness of breath and wheezing.    Cardiovascular: Negative.  Negative for chest pain, palpitations and leg swelling.   Gastrointestinal: Negative.  Negative for abdominal distention, abdominal pain, constipation, diarrhea, nausea and vomiting.   Endocrine: Negative.    Genitourinary: Negative.  Negative for dysuria and urgency.   Musculoskeletal: Negative.  Negative for back pain, joint swelling and neck stiffness.   Skin: Negative.  Negative for rash.   Allergic/Immunologic: Negative.  Negative for immunocompromised state.   Neurological: Negative.  Negative for light-headedness, numbness and headaches.   Hematological: Negative.  Negative for adenopathy.   Psychiatric/Behavioral: Negative.  Negative for agitation, behavioral problems and confusion.    All other systems reviewed and are negative.      Objective   Physical Exam  Vitals reviewed.   Constitutional:       General: She is not in acute distress.     Appearance: Normal appearance.   HENT:      Head: Normocephalic and atraumatic.      Nose: Nose normal.   Eyes:      Conjunctiva/sclera: Conjunctivae normal.      Pupils: Pupils are equal, round, and reactive to light.   Neck:      Vascular: No carotid bruit.   Cardiovascular:      Rate and Rhythm: Regular rhythm. Tachycardia present.      Pulses: Normal pulses.      Heart sounds:      No gallop.   Pulmonary:      Effort: Pulmonary effort is normal. No respiratory distress.      Breath sounds: Normal breath sounds. No wheezing.   Abdominal:      General: Bowel sounds are normal.      Palpations: Abdomen is soft.      Tenderness:  "There is no abdominal tenderness.   Musculoskeletal:         General: Normal range of motion.      Cervical back: Normal range of motion. No rigidity.   Lymphadenopathy:      Cervical: No cervical adenopathy.   Skin:     General: Skin is warm.      Findings: No rash.   Neurological:      General: No focal deficit present.      Mental Status: She is alert and oriented to person, place, and time.   Psychiatric:         Mood and Affect: Mood normal.         Behavior: Behavior normal.       /74 (BP Location: Right arm, Patient Position: Sitting)   Pulse 102   Ht 1.702 m (5' 7\")   Wt 54 kg (119 lb)   BMI 18.64 kg/m²    Hemoglobin A1C   Date/Time Value Ref Range Status   09/08/2023 09:32 AM 5.4 % Final     Comment:          Diagnosis of Diabetes-Adults   Non-Diabetic: < or = 5.6%   Increased risk for developing diabetes: 5.7-6.4%   Diagnostic of diabetes: > or = 6.5%  .       Monitoring of Diabetes                Age (y)     Therapeutic Goal (%)   Adults:          >18           <7.0   Pediatrics:    13-18           <7.5                   7-12           <8.0                   0- 6            7.5-8.5   American Diabetes Association. Diabetes Care 33(S1), Jan 2010.     Assessment/Plan   Problem List Items Addressed This Visit    None  Visit Diagnoses       Tachycardia    -  Primary    Relevant Medications    metoprolol succinate XL (Toprol-XL) 25 mg 24 hr tablet    Palpitations        Healthcare maintenance        Relevant Orders    Hepatitis B surface antibody    Hepatitis B core antibody, total    Hepatitis B surface antigen             Holter and sleep study dw pt    Fu before for toprol check med refill    Fu as before  "

## 2024-08-26 ENCOUNTER — APPOINTMENT (OUTPATIENT)
Dept: PRIMARY CARE | Facility: CLINIC | Age: 24
End: 2024-08-26
Payer: COMMERCIAL

## 2024-09-10 ENCOUNTER — APPOINTMENT (OUTPATIENT)
Dept: PRIMARY CARE | Facility: CLINIC | Age: 24
End: 2024-09-10
Payer: COMMERCIAL

## 2024-09-10 VITALS
SYSTOLIC BLOOD PRESSURE: 113 MMHG | HEIGHT: 67 IN | HEART RATE: 105 BPM | DIASTOLIC BLOOD PRESSURE: 76 MMHG | BODY MASS INDEX: 18.05 KG/M2 | WEIGHT: 115 LBS

## 2024-09-10 DIAGNOSIS — F90.2 ATTENTION DEFICIT HYPERACTIVITY DISORDER (ADHD), COMBINED TYPE: ICD-10-CM

## 2024-09-10 PROCEDURE — 1036F TOBACCO NON-USER: CPT | Performed by: INTERNAL MEDICINE

## 2024-09-10 PROCEDURE — 3008F BODY MASS INDEX DOCD: CPT | Performed by: INTERNAL MEDICINE

## 2024-09-10 PROCEDURE — 99213 OFFICE O/P EST LOW 20 MIN: CPT | Performed by: INTERNAL MEDICINE

## 2024-09-10 RX ORDER — DEXTROAMPHETAMINE SACCHARATE, AMPHETAMINE ASPARTATE MONOHYDRATE, DEXTROAMPHETAMINE SULFATE AND AMPHETAMINE SULFATE 5; 5; 5; 5 MG/1; MG/1; MG/1; MG/1
20 CAPSULE, EXTENDED RELEASE ORAL EVERY MORNING
Qty: 30 CAPSULE | Refills: 0 | Status: SHIPPED | OUTPATIENT
Start: 2024-09-10 | End: 2024-10-10

## 2024-09-10 ASSESSMENT — ENCOUNTER SYMPTOMS
CARDIOVASCULAR NEGATIVE: 1
NEUROLOGICAL NEGATIVE: 1
LIGHT-HEADEDNESS: 0
DYSURIA: 0
NECK STIFFNESS: 0
CONSTITUTIONAL NEGATIVE: 1
JOINT SWELLING: 0
HEADACHES: 0
BACK PAIN: 0
MUSCULOSKELETAL NEGATIVE: 1
CONSTIPATION: 0
ABDOMINAL DISTENTION: 0
DIARRHEA: 0
ABDOMINAL PAIN: 0
RESPIRATORY NEGATIVE: 1
ENDOCRINE NEGATIVE: 1
ADENOPATHY: 0
GASTROINTESTINAL NEGATIVE: 1
SHORTNESS OF BREATH: 0
PSYCHIATRIC NEGATIVE: 1
EYES NEGATIVE: 1
VOMITING: 0
AGITATION: 0
CHILLS: 0
FEVER: 0
EYE DISCHARGE: 0
CONFUSION: 0
COUGH: 0
NUMBNESS: 0
PALPITATIONS: 0
ALLERGIC/IMMUNOLOGIC NEGATIVE: 1
NAUSEA: 0
WHEEZING: 0
HEMATOLOGIC/LYMPHATIC NEGATIVE: 1

## 2024-09-10 NOTE — PROGRESS NOTES
Subjective   Patient ID: Jessi Alvarado is a 24 y.o. female who presents for Med Refill (aderrall).  Med refill  Adderall helps     Med Refill  Pertinent negatives include no abdominal pain, chest pain, chills, congestion, coughing, fever, headaches, joint swelling, nausea, numbness, rash or vomiting.       Review of Systems   Constitutional: Negative.  Negative for chills and fever.   HENT: Negative.  Negative for congestion.    Eyes: Negative.  Negative for discharge.   Respiratory: Negative.  Negative for cough, shortness of breath and wheezing.    Cardiovascular: Negative.  Negative for chest pain, palpitations and leg swelling.   Gastrointestinal: Negative.  Negative for abdominal distention, abdominal pain, constipation, diarrhea, nausea and vomiting.   Endocrine: Negative.    Genitourinary: Negative.  Negative for dysuria and urgency.   Musculoskeletal: Negative.  Negative for back pain, joint swelling and neck stiffness.   Skin: Negative.  Negative for rash.   Allergic/Immunologic: Negative.  Negative for immunocompromised state.   Neurological: Negative.  Negative for light-headedness, numbness and headaches.   Hematological: Negative.  Negative for adenopathy.   Psychiatric/Behavioral: Negative.  Negative for agitation, behavioral problems and confusion.    All other systems reviewed and are negative.      Objective   Physical Exam  Vitals reviewed.   Constitutional:       General: She is not in acute distress.     Appearance: Normal appearance.   HENT:      Head: Normocephalic and atraumatic.      Nose: Nose normal.   Eyes:      Conjunctiva/sclera: Conjunctivae normal.      Pupils: Pupils are equal, round, and reactive to light.   Neck:      Vascular: No carotid bruit.   Cardiovascular:      Rate and Rhythm: Normal rate and regular rhythm.      Pulses: Normal pulses.      Heart sounds:      No gallop.   Pulmonary:      Effort: Pulmonary effort is normal. No respiratory distress.      Breath sounds:  "Normal breath sounds. No wheezing.   Abdominal:      General: Bowel sounds are normal.      Palpations: Abdomen is soft.      Tenderness: There is no abdominal tenderness.   Musculoskeletal:         General: Normal range of motion.      Cervical back: Normal range of motion. No rigidity.   Lymphadenopathy:      Cervical: No cervical adenopathy.   Skin:     General: Skin is warm.      Findings: No rash.   Neurological:      General: No focal deficit present.      Mental Status: She is alert and oriented to person, place, and time.   Psychiatric:         Mood and Affect: Mood normal.         Behavior: Behavior normal.       /76 (BP Location: Right arm, Patient Position: Sitting)   Pulse 105   Ht 1.702 m (5' 7\")   Wt 52.2 kg (115 lb)   BMI 18.01 kg/m²    Hemoglobin A1C   Date/Time Value Ref Range Status   09/08/2023 09:32 AM 5.4 % Final     Comment:          Diagnosis of Diabetes-Adults   Non-Diabetic: < or = 5.6%   Increased risk for developing diabetes: 5.7-6.4%   Diagnostic of diabetes: > or = 6.5%  .       Monitoring of Diabetes                Age (y)     Therapeutic Goal (%)   Adults:          >18           <7.0   Pediatrics:    13-18           <7.5                   7-12           <8.0                   0- 6            7.5-8.5   American Diabetes Association. Diabetes Care 33(S1), Jan 2010.     Assessment/Plan   Problem List Items Addressed This Visit       ADHD (attention deficit hyperactivity disorder)    Relevant Medications    amphetamine-dextroamphetamine XR (Adderall XR) 20 mg 24 hr capsule        OARRS HAS BEEN REVIEWED AND IS CONSISTENT WITH PRESCRIBED MEDICATIONS, CONSIDERED THE RISK OF ABUSE, DEPENDENCE, ADDICTION AND DIVERSION, MEDICATION IS FELT TO BE CLINICALLY APPROPRIATE ON THE DOCUMENTED DIAGNOSIS      Utox and contract up to date    Fu 1 mo  "

## 2024-09-24 ENCOUNTER — OFFICE VISIT (OUTPATIENT)
Dept: URGENT CARE | Facility: CLINIC | Age: 24
End: 2024-09-24
Payer: COMMERCIAL

## 2024-09-24 VITALS
HEIGHT: 67 IN | TEMPERATURE: 98.1 F | WEIGHT: 120 LBS | SYSTOLIC BLOOD PRESSURE: 104 MMHG | BODY MASS INDEX: 18.83 KG/M2 | RESPIRATION RATE: 16 BRPM | DIASTOLIC BLOOD PRESSURE: 69 MMHG | OXYGEN SATURATION: 100 % | HEART RATE: 94 BPM

## 2024-09-24 DIAGNOSIS — H65.01 NON-RECURRENT ACUTE SEROUS OTITIS MEDIA OF RIGHT EAR: Primary | ICD-10-CM

## 2024-09-24 PROCEDURE — 99213 OFFICE O/P EST LOW 20 MIN: CPT | Performed by: NURSE PRACTITIONER

## 2024-09-24 RX ORDER — FLUTICASONE PROPIONATE 50 MCG
1 SPRAY, SUSPENSION (ML) NASAL DAILY
Qty: 16 G | Refills: 0 | Status: SHIPPED | OUTPATIENT
Start: 2024-09-24 | End: 2025-09-24

## 2024-09-24 RX ORDER — AMOXICILLIN 875 MG/1
875 TABLET, FILM COATED ORAL 2 TIMES DAILY
Qty: 14 TABLET | Refills: 0 | Status: SHIPPED | OUTPATIENT
Start: 2024-09-24 | End: 2024-10-01

## 2024-09-24 NOTE — PROGRESS NOTES
24 y.o. female presents for evaluation of right ear pain for the past 10 days.  States she has noticed some clear drainage.  Denies fever, URI symptoms, headache, change in hearing, nausea, vomiting, diarrhea or any other associated symptom or complaint.  No OTC meds for symptom management.  States she is supposed to take Claritin for allergies but does not because it makes her drowsy.  No other complaints.      Vitals:    09/24/24 1153   BP: 104/69   Pulse: 94   Resp: 16   Temp: 36.7 °C (98.1 °F)   SpO2: 100%       Allergies   Allergen Reactions    Cinnamon Shortness of breath     cinnamon    Pomegranate Fruit Extract Shortness of breath     pomagranate    Sulfa (Sulfonamide Antibiotics) Other    Sulfamethoxazole-Trimethoprim Hives and Other       Medication Documentation Review Audit       Reviewed by Kyara Cary MA (Medical Assistant) on 09/24/24 at 1152      Medication Order Taking? Sig Documenting Provider Last Dose Status   albuterol 2.5 mg /3 mL (0.083 %) nebulizer solution [id number] Yes Inhale 3 mL (2.5 mg) every 6 hours if needed for wheezing. Historical Provider, MD Taking Active   albuterol 90 mcg/actuation inhaler [id number] Yes Inhale 2 puffs every 4 hours if needed for wheezing. Lambert Chaudhari MD Taking Active   amphetamine-dextroamphetamine XR (Adderall XR) 20 mg 24 hr capsule [id number] Yes Take 1 capsule (20 mg) by mouth once daily in the morning. Do not crush or chew. Lambert Chaudhari MD Taking Active   ascorbic acid, vitamin C, 500 mg capsule [id number] No Take by mouth. Historical Provider, MD Not Taking Active   busPIRone (Buspar) 5 mg tablet [id number] Yes Take 1 tablet (5 mg) by mouth 3 times a day as needed (anxiety). Emi Cox MD Taking Active   cholecalciferol (Vitamin D-3) 125 MCG (5000 UT) capsule [id number] No Take by mouth. Historical Provider, MD Not Taking Active   doxycycline (Vibramycin) 100 mg capsule [id number] No Take 1 capsule (100 mg) by mouth once daily. Take with  at least 8 ounces (large glass) of water, do not lie down for 30 minutes after   Patient not taking: Reported on 2024    Emi Cox MD Not Taking Active   drospirenone-ethinyl estradioL (Alisha, 28,) 3-0.03 mg tablet [id number] No Take 1 tablet by mouth once daily.   Patient not taking: Reported on 2024    Edouard Crisostomo MD Not Taking Active   elderberry fruit and flower 460-115 mg capsule [id number] No Take by mouth. Historical Provider, MD Not Taking Active   EPINEPHrine 0.3 mg/0.3 mL injection syringe [id number] Yes 0.3 MILLIGRAM(S) INTRAMUSCULARLY ONCE A DAY, AS NEEDED FOR SEVERE ALLERGIC REACTION Lambert Chaudhari MD Taking Active   famotidine (Pepcid) 20 mg tablet [id number]  Take 1 tablet (20 mg) by mouth 2 times a day.   Patient taking differently: Take 1 tablet (20 mg) by mouth 2 times a day as needed.    Emi Cox MD   24 2359   ibuprofen 600 mg tablet [id number] Yes Take 1 tablet (600 mg) by mouth every 8 hours if needed for mild pain (1 - 3) or fever (temp greater than 38.0 C). ROMY Gomez Taking Active   ketorolac (Toradol) 10 mg tablet [id number] No Take 1 tablet (10 mg) by mouth every 6 hours if needed for moderate pain (4 - 6) or severe pain (7 - 10). Historical Provider, MD Not Taking Active   levonorgestreL-ethinyl estrad (Aviane, Alesse, Lessina) 0.1 mg- 20 mcg tablet [id number] No Chew 1 tablet once daily.   Patient not taking: Reported on 2024    LAY Mckenna-MEJIA, LAY-CNP, DNP Not Taking Active   metoprolol succinate XL (Toprol-XL) 25 mg 24 hr tablet [id number] Yes Take 1 tablet (25 mg) by mouth once daily. Do not crush or chew. Lambert Chaudhari MD Taking Active   mometasone (Asmanex) 110 mcg/ actuation (30) inhaler [id number]  Inhale 1 puff once daily. Rinse mouth with water after use to reduce aftertaste and incidence of candidiasis. Do not swallow. Lambert Chaudhari MD   24 [id number]   omeprazole (PriLOSEC) 20 mg DR capsule  [id number]  Take 1 capsule (20 mg) by mouth once daily in the morning. Take before meals. Do not crush or chew. Lambert Chaudhari MD   24   ondansetron (Zofran) 4 mg tablet [id number] Yes Take 1 tablet (4 mg) by mouth every 8 hours if needed for nausea or vomiting. Idania Bolanos MD Taking Active   pedi multivit no.17 w-fluoride (Poly-Vi-Cony) 0.5 mg tablet,chewable [id number] Yes Chew 1 tablet once daily. Historical Provider, MD Taking Active   SUMAtriptan (Imitrex) 50 mg tablet [id number] Yes Take 1 tablet (50 mg) by mouth 1 time if needed for migraine. May repeat after 2 hours. Emi Cox MD Taking Active   zinc sulfate (Zincate) 220 (50 Zn) MG capsule [id number] No Take by mouth once daily. Historical Provider, MD Not Taking Active                    Past Medical History:   Diagnosis Date    Anxiety and depression     Asthma attack ([facility])     Attention-deficit hyperactivity disorder, predominantly hyperactive type     Attention deficit hyperactivity disorder (ADHD), predominantly hyperactive type    Concussion with no loss of consciousness 2018    Contusion of left hand 2023    Groin abscess 2023    Hand sprain, left, sequela 2023    Hematuria 2023    Left wrist pain 2023    Left wrist sprain, sequela 2023    Lump of skin 2023    Mitral valve prolapse     Numbness of fingers 2023    Other conditions influencing health status 2020    Menarche    Personal history of other diseases of the female genital tract     History of ovarian cyst    Personal history of other diseases of the respiratory system     History of asthma    PONV (postoperative nausea and vomiting)     Post-op pain 2023    Right knee pain 2023    Stiffness of right knee, not elsewhere classified 2023       Past Surgical History:   Procedure Laterality Date    FOREIGN BODY REMOVAL Right 2024    NEXPLANON REMOVAL/ DR BOLANOS    KNEE SURGERY       WISDOM TOOTH EXTRACTION         ROS  See HPI    Physical Exam  Vitals and nursing note reviewed.   Constitutional:       Appearance: Normal appearance. She is normal weight.   HENT:      Head: Normocephalic and atraumatic.      Right Ear: Ear canal normal. A middle ear effusion (clear) is present. Tympanic membrane is erythematous. Tympanic membrane is not perforated or bulging.      Left Ear: Ear canal normal. A middle ear effusion (clear) is present. Tympanic membrane is not perforated, erythematous or bulging.      Nose: Nose normal.      Mouth/Throat:      Mouth: Mucous membranes are moist.      Pharynx: Oropharynx is clear.   Eyes:      Extraocular Movements: Extraocular movements intact.      Conjunctiva/sclera: Conjunctivae normal.      Pupils: Pupils are equal, round, and reactive to light.   Cardiovascular:      Rate and Rhythm: Normal rate.   Skin:     General: Skin is warm and dry.   Neurological:      General: No focal deficit present.      Mental Status: She is alert and oriented to person, place, and time.   Psychiatric:         Mood and Affect: Mood normal.         Behavior: Behavior normal.           Assessment/Plan/MDM  Jessi was seen today for earache.  Diagnoses and all orders for this visit:  Non-recurrent acute serous otitis media of right ear (Primary)  -     amoxicillin (Amoxil) 875 mg tablet; Take 1 tablet (875 mg) by mouth 2 times a day for 7 days.  -     fluticasone (Flonase) 50 mcg/actuation nasal spray; Administer 1 spray into each nostril once daily. Shake gently. Before first use, prime pump. After use, clean tip and replace cap.    Encouraged patient to also use Claritin at bedtime, push p.o. fluids.  Patient's clinical presentation is otherwise unremarkable at this time. Patient is discharged with instructions to follow-up with primary care or seek emergency medical attention for worsening symptoms or any new concerns.    I did personally review Jessi's past medical history,  surgical history, social history, as well as family history (when relevant).  In this case, I also oversaw the her drug management by reviewing her medication list, allergy list, as well as the medications that I prescribed during the UC course and/or recommended as an out-patient (including possible OTC medications such as acetaminophen, NSAIDs , etc).    After reviewing the items above, I did look at previous medical documentation, such as recent hospitalizations, office visits, and/or recent consultations with PCP/specialist.                          SDOH:   Another factor that I considered in Jessi's care was her Social Determinants of Health (SDOH). During this UC encounter, she did not have social determinants of health. Those SDOH influencing Jessi's care are: none      Frantz Onofre CNP  [facility]  [phone]

## 2024-09-25 ENCOUNTER — APPOINTMENT (OUTPATIENT)
Dept: RADIOLOGY | Facility: HOSPITAL | Age: 24
End: 2024-09-25
Payer: COMMERCIAL

## 2024-09-25 ENCOUNTER — HOSPITAL ENCOUNTER (EMERGENCY)
Facility: HOSPITAL | Age: 24
Discharge: HOME | End: 2024-09-25
Attending: EMERGENCY MEDICINE
Payer: COMMERCIAL

## 2024-09-25 VITALS
DIASTOLIC BLOOD PRESSURE: 54 MMHG | RESPIRATION RATE: 16 BRPM | BODY MASS INDEX: 18.83 KG/M2 | OXYGEN SATURATION: 97 % | HEART RATE: 87 BPM | HEIGHT: 67 IN | WEIGHT: 120 LBS | SYSTOLIC BLOOD PRESSURE: 104 MMHG | TEMPERATURE: 99 F

## 2024-09-25 DIAGNOSIS — M25.561 ACUTE PAIN OF RIGHT KNEE: Primary | ICD-10-CM

## 2024-09-25 PROCEDURE — 2500000004 HC RX 250 GENERAL PHARMACY W/ HCPCS (ALT 636 FOR OP/ED): Performed by: EMERGENCY MEDICINE

## 2024-09-25 PROCEDURE — 96374 THER/PROPH/DIAG INJ IV PUSH: CPT

## 2024-09-25 PROCEDURE — 73564 X-RAY EXAM KNEE 4 OR MORE: CPT | Mod: RT

## 2024-09-25 PROCEDURE — 73564 X-RAY EXAM KNEE 4 OR MORE: CPT | Mod: RIGHT SIDE | Performed by: RADIOLOGY

## 2024-09-25 PROCEDURE — 99284 EMERGENCY DEPT VISIT MOD MDM: CPT | Mod: 25

## 2024-09-25 PROCEDURE — 96375 TX/PRO/DX INJ NEW DRUG ADDON: CPT

## 2024-09-25 RX ORDER — OXYCODONE AND ACETAMINOPHEN 5; 325 MG/1; MG/1
1 TABLET ORAL EVERY 8 HOURS PRN
Qty: 9 TABLET | Refills: 0 | Status: SHIPPED | OUTPATIENT
Start: 2024-09-25 | End: 2024-09-28

## 2024-09-25 RX ORDER — ONDANSETRON HYDROCHLORIDE 2 MG/ML
4 INJECTION, SOLUTION INTRAVENOUS ONCE
Status: DISCONTINUED | OUTPATIENT
Start: 2024-09-25 | End: 2024-09-25 | Stop reason: HOSPADM

## 2024-09-25 RX ORDER — KETOROLAC TROMETHAMINE 30 MG/ML
15 INJECTION, SOLUTION INTRAMUSCULAR; INTRAVENOUS ONCE
Status: COMPLETED | OUTPATIENT
Start: 2024-09-25 | End: 2024-09-25

## 2024-09-25 ASSESSMENT — PAIN SCALES - GENERAL
PAINLEVEL_OUTOF10: 7

## 2024-09-25 ASSESSMENT — PAIN - FUNCTIONAL ASSESSMENT
PAIN_FUNCTIONAL_ASSESSMENT: 0-10
PAIN_FUNCTIONAL_ASSESSMENT: 0-10

## 2024-09-25 ASSESSMENT — COLUMBIA-SUICIDE SEVERITY RATING SCALE - C-SSRS
2. HAVE YOU ACTUALLY HAD ANY THOUGHTS OF KILLING YOURSELF?: NO
1. IN THE PAST MONTH, HAVE YOU WISHED YOU WERE DEAD OR WISHED YOU COULD GO TO SLEEP AND NOT WAKE UP?: NO
6. HAVE YOU EVER DONE ANYTHING, STARTED TO DO ANYTHING, OR PREPARED TO DO ANYTHING TO END YOUR LIFE?: NO

## 2024-09-25 ASSESSMENT — PAIN DESCRIPTION - ORIENTATION: ORIENTATION: RIGHT

## 2024-09-25 ASSESSMENT — PAIN DESCRIPTION - LOCATION: LOCATION: KNEE

## 2024-09-25 NOTE — Clinical Note
Jessi Alvarado was seen and treated in our emergency department on 9/25/2024.  She may return to work on 09/30/2024.       If you have any questions or concerns, please don't hesitate to call.      Carlos Medrano, DO

## 2024-09-25 NOTE — ED PROVIDER NOTES
"HPI   Chief Complaint   Patient presents with    Knee Pain     Brought to ED per [facility] squad from home with c/o R knee pain. She reports that she turned over in bed and felt a \"pop\" in R knee and had sudden sharp pain       Limitations to History: None    HPI: 24-year-old female presents with right knee pain.  States that she was rolling over in bed this morning and felt a pop in her knee.  History of 2 previous MCL tears with repair.  States the pain is sharp in nature.  Worse with movement.  Denies any numbness or tingling.    Additional History Obtained from: EMS.    ------------------------------------------------------------------------------------------------------------------------------------------  Physical Exam:    VS: As documented in the triage note and EMR flowsheet from this visit were reviewed.    Appearance: Alert. cooperative,  in no acute distress.   Skin: Intact,  dry skin, no lesions, rash, petechiae or purpura.   Musculoskeletal: Right knee motion limited secondary to pain.  Tenderness to palpation.  Strong palpable pulses.    Neurological: Sensation grossly intact to the right lower extremity.  Psychiatric: Appropriate mood and affect.                Patient History   Past Medical History:   Diagnosis Date    Anxiety and depression     Asthma attack ([facility])     Attention-deficit hyperactivity disorder, predominantly hyperactive type     Attention deficit hyperactivity disorder (ADHD), predominantly hyperactive type    Concussion with no loss of consciousness 03/02/2018    Contusion of left hand 04/18/2023    Groin abscess 04/18/2023    Hand sprain, left, sequela 04/18/2023    Hematuria 04/18/2023    Left wrist pain 04/18/2023    Left wrist sprain, sequela 04/18/2023    Lump of skin 04/18/2023    Mitral valve prolapse     Numbness of fingers 04/18/2023    Other conditions influencing health status 09/23/2020    Menarche    Personal history of other diseases of the female genital tract     " History of ovarian cyst    Personal history of other diseases of the respiratory system     History of asthma    PONV (postoperative nausea and vomiting)     Post-op pain 04/18/2023    Right knee pain 04/18/2023    Stiffness of right knee, not elsewhere classified 04/18/2023     Past Surgical History:   Procedure Laterality Date    FOREIGN BODY REMOVAL Right 06/20/2024    NEXPLANON REMOVAL/ DR BOLANOS    KNEE SURGERY      WISDOM TOOTH EXTRACTION       Family History   Problem Relation Name Age of Onset    No Known Problems Mother      ADD / ADHD Father      Cancer Maternal Grandmother      Hypertension Maternal Grandfather      IRMA disease Paternal Grandmother      Irritable bowel syndrome Paternal Grandmother      Hypertension Paternal Grandfather       Social History     Tobacco Use    Smoking status: Former     Types: Cigarettes     Passive exposure: Never    Smokeless tobacco: Never   Vaping Use    Vaping status: Former    Quit date: 1/1/2024    Substances: Nicotine    Devices: [employer]   Substance Use Topics    Alcohol use: Not Currently    Drug use: Never       Physical Exam   ED Triage Vitals [09/25/24 0919]   Temperature Heart Rate Respirations BP   37.2 °C (99 °F) 89 16 117/78      Pulse Ox Temp src Heart Rate Source Patient Position   100 % -- -- --      BP Location FiO2 (%)     -- --       Physical Exam      ED Course & MDM   Diagnoses as of 09/25/24 1109   Acute pain of right knee                 No data recorded     Maulik Coma Scale Score: 15 (09/25/24 0920 : Laura Rojas RN)                           Medical Decision Making  XR knee right 4+ views   Final Result    No acute pathologic findings are identified.          MACRO:    none          Signed by: Steve Chan 9/25/2024 9:46 AM    Dictation workstation:   [id number]     Medical Decision Making:    Patient appears well nontoxic.  Acute pain secondary to her right knee injury.  Patient treated with intravenous Dilaudid.  Placed in  knee immobilizer.  X-ray negative.  Crutches will be used and made nonweightbearing and follow-up with orthopedic surgery.  Given Percocet for home.  Stable at time of discharge.    Differential Diagnoses Considered: Ligamentous versus meniscal injury of the right knee, fracture or dislocation.    Independent Interpretation of Studies:  I independently interpreted: Right knee x-ray shows no acute fracture or dislocation.    Escalation of Care:  Appropriate for discharge and follow-up with orthopedic surgery.    Prescription Drug Consideration: Oral Percocet.            Procedure  Splint Application    Performed by: Carlos Medrano DO  Authorized by: Carlos Medrano DO    Consent:     Consent obtained:  Verbal    Consent given by:  Patient  Pre-procedure details:     Distal neurologic exam:  Normal    Distal perfusion: distal pulses strong    Procedure details:     Location:  Knee    Knee location:  R knee    Supplies:  Prefabricated splint (knee immobilizer)    Splint applied and adjusted personally by me: Placed by nursing staff.    Post-procedure details:     Distal neurologic exam:  Normal    Distal perfusion: distal pulses strong      Procedure completion:  Tolerated       Carlos Medrano DO  09/25/24 1110       Carlos Medrano DO  10/16/24 1244

## 2024-09-26 ENCOUNTER — OFFICE VISIT (OUTPATIENT)
Dept: ORTHOPEDIC SURGERY | Facility: CLINIC | Age: 24
End: 2024-09-26
Payer: COMMERCIAL

## 2024-09-26 DIAGNOSIS — M54.16 LUMBAR BACK PAIN WITH RADICULOPATHY AFFECTING LEFT LOWER EXTREMITY: ICD-10-CM

## 2024-09-26 DIAGNOSIS — S89.91XA KNEE INJURIES, RIGHT, INITIAL ENCOUNTER: Primary | ICD-10-CM

## 2024-09-26 PROCEDURE — 99214 OFFICE O/P EST MOD 30 MIN: CPT | Performed by: NURSE PRACTITIONER

## 2024-09-26 RX ORDER — KETOROLAC TROMETHAMINE 10 MG/1
10 TABLET, FILM COATED ORAL 3 TIMES DAILY
Qty: 15 TABLET | Refills: 0 | Status: SHIPPED | OUTPATIENT
Start: 2024-09-26 | End: 2024-10-01

## 2024-09-26 ASSESSMENT — PAIN SCALES - GENERAL: PAINLEVEL_OUTOF10: 7

## 2024-09-26 NOTE — PROGRESS NOTES
Subjective    Patient ID: Jessi Alvarado is a 24 y.o. female.    Chief Complaint: Follow-up and Pain of the Right Knee (Patient was seen in the [facility] ER 09/25/24)     HPI  Jessi is a pleasant 24-year-old female presenting today for new complaint of right knee pain, swelling  Sudden onset knee pain as trying to get out of bed esterday, gave way causing twisting injury and fall.  Initial scope 5 yrs ago with meniscal repair, stitch did not hold, scarring and second arthroscopy performed by Dr. Peoples approximately 2 to 3 years ago.  Seen in ED yesterday, placed in immobilizer and crutches  Reports unable to bear weight.  Patient was given prescription for generic Percocet, taking as needed with some symptom improvement.  No other meds attempted.  Icing as needed and keeping elevated.  Jessi is accompanied by her mother for today's visit.    Review of Systems   Constitutional: Negative.    HENT: Negative.     Respiratory: Negative.     Cardiovascular: Negative.    Endocrine: Negative.    Musculoskeletal:  Positive for arthralgias.   Skin: Negative.    Neurological: Negative.    Hematological: Negative.    Psychiatric/Behavioral: Negative.        Objective   Right Knee Exam     Tenderness   The patient is experiencing tenderness in the lateral joint line and medial joint line (Generalized anterior and popliteal fossa).    Range of Motion   Extension:  0   Flexion:  30     Other   Erythema: absent  Sensation: normal  Pulse: present  Swelling: moderate    Comments:  Patient has moderate amount of anterior swelling present, positive suprapatellar effusion present.  Skin is dry and intact with older appearing bruise over the patella, attributes to fall approximately 1 week ago.  Painful with any attempt with range of motion other than extension.  Attempted Lachman's and stress testing, patient with poor tolerance with minimal manipulation.  Much encouragement for near full range of motion of ankle, symptoms are  aggravated with inversion and eversion.  Distal motor and sensory intact, cap refill at 2 seconds.      Left Knee Exam   Left knee exam is normal.        Image Results:  XR knee right 4+ views  Narrative: Interpreted By:  Steve Chan,   STUDY:  XR KNEE RIGHT 4+ VIEWS; 9/25/2024 9:28 am      INDICATION:  Signs/Symptoms:right knee pain.      COMPARISON:  01/20/2022      ACCESSION NUMBER(S):  [id number]      ORDERING CLINICIAN:  EMANUEL ELLER      TECHNIQUE:  Right knee four views      FINDINGS:  No fractures or destructive lesions are identified. There is no  evidence for an effusion. Joint spaces are maintained. There is no  evidence for chondrocalcinosis. The frontal projections are somewhat  limited as the knee is held in flexion all 3 frontal views.      Impression: No acute pathologic findings are identified.      MACRO:  none      Signed by: Steve Chan 9/25/2024 9:46 AM  Dictation workstation:   [id number]      Assessment/Plan   Encounter Diagnoses:  Problem List Items Addressed This Visit             ICD-10-CM    Knee injuries, right, initial encounter - Primary S89.91XA     Prescription for ketorolac p.o. 3 times daily x 5 days sent to pharmacy, encouraged  Rest, ice frequently throughout the day, elevate and knee immobilizer with weight bearing activity, off with rest and sleep, may use crutches for partial weightbearing if tolerated, otherwise nonweightbearing.  Patient may remove knee immobilizer daily for skin hygiene and gentle range of motion as tolerated  Plan to return in 1 week for ligament testing once swelling begins to improve, may order imaging after assessment  Work note provided, short-term disability paperwork presented for completion  Patient and family in agreement with current plan of care  This note was generated using Dragon software.  It may contain errors in wording, punctuation or spelling.         Relevant Medications    ketorolac (Toradol) 10 mg tablet    Other Relevant  Orders    Follow Up In Orthopaedic Surgery     Other Visit Diagnoses         Codes    Lumbar back pain with radiculopathy affecting left lower extremity     M54.16

## 2024-09-26 NOTE — ASSESSMENT & PLAN NOTE
Prescription for ketorolac p.o. 3 times daily x 5 days sent to pharmacy, encouraged  Rest, ice frequently throughout the day, elevate and knee immobilizer with weight bearing activity, off with rest and sleep, may use crutches for partial weightbearing if tolerated, otherwise nonweightbearing.  Patient may remove knee immobilizer daily for skin hygiene and gentle range of motion as tolerated  Plan to return in 1 week for ligament testing once swelling begins to improve, may order imaging after assessment  Work note provided, short-term disability paperwork presented for completion  Patient and family in agreement with current plan of care  This note was generated using Dragon software.  It may contain errors in wording, punctuation or spelling.

## 2024-09-27 ASSESSMENT — ENCOUNTER SYMPTOMS
RESPIRATORY NEGATIVE: 1
NEUROLOGICAL NEGATIVE: 1
HEMATOLOGIC/LYMPHATIC NEGATIVE: 1
ENDOCRINE NEGATIVE: 1
PSYCHIATRIC NEGATIVE: 1
CARDIOVASCULAR NEGATIVE: 1
ARTHRALGIAS: 1
CONSTITUTIONAL NEGATIVE: 1

## 2024-10-03 ENCOUNTER — APPOINTMENT (OUTPATIENT)
Dept: ORTHOPEDIC SURGERY | Facility: CLINIC | Age: 24
End: 2024-10-03
Payer: COMMERCIAL

## 2024-10-03 DIAGNOSIS — M23.91 INTERNAL DERANGEMENT OF KNEE JOINT, RIGHT: Primary | ICD-10-CM

## 2024-10-03 DIAGNOSIS — S89.91XA KNEE INJURIES, RIGHT, INITIAL ENCOUNTER: ICD-10-CM

## 2024-10-03 PROCEDURE — 99214 OFFICE O/P EST MOD 30 MIN: CPT | Performed by: NURSE PRACTITIONER

## 2024-10-03 ASSESSMENT — ENCOUNTER SYMPTOMS
NEUROLOGICAL NEGATIVE: 1
RESPIRATORY NEGATIVE: 1
ARTHRALGIAS: 1
CONSTITUTIONAL NEGATIVE: 1
CARDIOVASCULAR NEGATIVE: 1
PSYCHIATRIC NEGATIVE: 1
KNEE SWELLING: 1
ENDOCRINE NEGATIVE: 1
HEMATOLOGIC/LYMPHATIC NEGATIVE: 1

## 2024-10-03 ASSESSMENT — PAIN - FUNCTIONAL ASSESSMENT: PAIN_FUNCTIONAL_ASSESSMENT: 0-10

## 2024-10-03 ASSESSMENT — PAIN DESCRIPTION - DESCRIPTORS: DESCRIPTORS: SPASM;SHARP;SHOOTING;ACHING

## 2024-10-03 ASSESSMENT — PAIN SCALES - GENERAL: PAINLEVEL_OUTOF10: 6

## 2024-10-03 NOTE — PROGRESS NOTES
"Subjective    Patient ID: Jessi Alvarado is a 24 y.o. female.    Chief Complaint: Follow-up and Pain of the Right Knee (Patient states \"things seem to be going ok with my knee, but lower leg turns purple from about mid lower leg and down to her foot. Her toes are swelling.\")     Right Knee       Jessi is a pleasant 24-year-old female presenting today for nFUV of right knee pain, swelling  Sudden onset knee pain as trying to get out of bed  9/25/24 when knee  gave way causing twisting injury and fall.  Initial scope 5 yrs ago with meniscal repair, stitch did not hold, scarring and second arthroscopy performed by Dr. Peoples approximately 2 to 3 years ago.  Continues in knee immobilizer and crutches, occasional touch toe weightbearing to aggravate symptoms  Patient has 2 remaining ketorolac tablets which do offer improvement of symptoms.  Icing as needed and keeping elevated.  Jessi is accompanied by her mother for today's visit.    Review of Systems   Constitutional: Negative.    HENT: Negative.     Respiratory: Negative.     Cardiovascular: Negative.    Endocrine: Negative.    Musculoskeletal:  Positive for arthralgias.   Skin: Negative.    Neurological: Negative.    Hematological: Negative.    Psychiatric/Behavioral: Negative.        Objective   Right Knee Exam     Tenderness   The patient is experiencing tenderness in the lateral joint line and medial joint line (Generalized anterior and popliteal fossa).    Range of Motion   Extension:  0   Flexion:  60     Tests   Lachman:  Right knee anterior Lachman test: Indeterminate.        Other   Erythema: absent  Sensation: normal  Pulse: present  Swelling: moderate    Comments:  Patient has mild to moderate amount of anterior swelling present, positive suprapatellar effusion present; slightly improved from initial visit  Skin is dry and intact with older fading ecchymosis.  Painful with range of motion other than extension.    Attempted Lachman's which is " indeterminate, significant increase in pain symptoms  Pain with varus and valgus stress, unable to determine laxity due to tightening with attempted exam.  Continues with poor tolerance with minimal manipulation.  Much encouragement for near full range of motion of ankle, which aggravates knee symptoms.  Patient noted lifting thigh to change positions and heavy guarding with any motion or activity  Distal motor and sensory intact, cap refill at 2 seconds.      Left Knee Exam   Left knee exam is normal.        Image Results:  XR knee right 4+ views  Narrative: Interpreted By:  Steve Chan,   STUDY:  XR KNEE RIGHT 4+ VIEWS; 9/25/2024 9:28 am      INDICATION:  Signs/Symptoms:right knee pain.      COMPARISON:  01/20/2022      ACCESSION NUMBER(S):  [id number]      ORDERING CLINICIAN:  EMANUEL ELLER      TECHNIQUE:  Right knee four views      FINDINGS:  No fractures or destructive lesions are identified. There is no  evidence for an effusion. Joint spaces are maintained. There is no  evidence for chondrocalcinosis. The frontal projections are somewhat  limited as the knee is held in flexion all 3 frontal views.      Impression: No acute pathologic findings are identified.      MACRO:  none      Signed by: Steve Chan 9/25/2024 9:46 AM  Dictation workstation:   [id number]      Assessment/Plan   Encounter Diagnoses:  Problem List Items Addressed This Visit             ICD-10-CM    Knee injuries, right, initial encounter S89.91XA     We discussed use of ibuprofen 400 to 600 mg 3 times daily with food, then may decrease to as needed basis.  Tylenol as needed per package directions.  Rest, ice frequently throughout the day, elevate and knee immobilizer with weight bearing activity, off with rest and sleep, may use crutches for partial weightbearing if tolerated, otherwise nonweightbearing.  Requesting MRI for suspected internal derangement of the right knee.  DDx includes ACL tear, meniscal tearing, MCL sprain  versus tear  Work note provided, short-term disability paperwork presented for completion  Patient and family in agreement with current plan of care  This note was generated using Dragon software.  It may contain errors in wording, punctuation or spelling.         Relevant Orders    MR knee right wo IV contrast     Other Visit Diagnoses         Codes    Internal derangement of knee joint, right    -  Primary M23.91    Relevant Orders    Follow Up In Orthopaedic Surgery

## 2024-10-07 DIAGNOSIS — M23.91 INTERNAL DERANGEMENT OF KNEE JOINT, RIGHT: ICD-10-CM

## 2024-10-07 RX ORDER — IBUPROFEN 600 MG/1
600 TABLET ORAL 3 TIMES DAILY PRN
Qty: 90 TABLET | Refills: 0 | Status: SHIPPED | OUTPATIENT
Start: 2024-10-07 | End: 2024-11-06

## 2024-10-07 NOTE — ASSESSMENT & PLAN NOTE
We discussed use of ibuprofen 400 to 600 mg 3 times daily with food, then may decrease to as needed basis.  Tylenol as needed per package directions.  Rest, ice frequently throughout the day, elevate and knee immobilizer with weight bearing activity, off with rest and sleep, may use crutches for partial weightbearing if tolerated, otherwise nonweightbearing.  Requesting MRI for suspected internal derangement of the right knee.  DDx includes ACL tear, meniscal tearing, MCL sprain versus tear  Work note provided, short-term disability paperwork presented for completion  Patient and family in agreement with current plan of care  This note was generated using Dragon software.  It may contain errors in wording, punctuation or spelling.

## 2024-10-09 ENCOUNTER — HOSPITAL ENCOUNTER (OUTPATIENT)
Dept: RADIOLOGY | Facility: EXTERNAL LOCATION | Age: 24
Discharge: HOME | End: 2024-10-09

## 2024-10-09 ENCOUNTER — PREP FOR PROCEDURE (OUTPATIENT)
Dept: ORTHOPEDIC SURGERY | Facility: CLINIC | Age: 24
End: 2024-10-09

## 2024-10-09 ENCOUNTER — HOSPITAL ENCOUNTER (OUTPATIENT)
Dept: RADIOLOGY | Facility: HOSPITAL | Age: 24
Discharge: HOME | End: 2024-10-09
Payer: COMMERCIAL

## 2024-10-09 ENCOUNTER — OFFICE VISIT (OUTPATIENT)
Dept: ORTHOPEDIC SURGERY | Facility: CLINIC | Age: 24
End: 2024-10-09
Payer: COMMERCIAL

## 2024-10-09 DIAGNOSIS — Z01.818 PRE-OP EVALUATION: ICD-10-CM

## 2024-10-09 DIAGNOSIS — S89.91XA KNEE INJURIES, RIGHT, INITIAL ENCOUNTER: ICD-10-CM

## 2024-10-09 DIAGNOSIS — S89.91XA INJURY OF KNEE, RIGHT, INITIAL ENCOUNTER: ICD-10-CM

## 2024-10-09 PROCEDURE — 99214 OFFICE O/P EST MOD 30 MIN: CPT | Performed by: SPECIALIST

## 2024-10-09 PROCEDURE — 1036F TOBACCO NON-USER: CPT | Performed by: SPECIALIST

## 2024-10-09 PROCEDURE — 73721 MRI JNT OF LWR EXTRE W/O DYE: CPT | Mod: RT

## 2024-10-09 PROCEDURE — 76882 US LMTD JT/FCL EVL NVASC XTR: CPT | Performed by: SPECIALIST

## 2024-10-09 PROCEDURE — 73721 MRI JNT OF LWR EXTRE W/O DYE: CPT | Mod: RIGHT SIDE | Performed by: RADIOLOGY

## 2024-10-09 ASSESSMENT — PAIN SCALES - GENERAL: PAINLEVEL_OUTOF10: 8

## 2024-10-09 ASSESSMENT — PAIN - FUNCTIONAL ASSESSMENT: PAIN_FUNCTIONAL_ASSESSMENT: 0-10

## 2024-10-09 ASSESSMENT — PAIN DESCRIPTION - DESCRIPTORS: DESCRIPTORS: SHARP;SPASM

## 2024-10-09 NOTE — ASSESSMENT & PLAN NOTE
Assessment: Reinjury of a previously injured knee where she had a meniscal repair and subsequent meniscectomy.  About 2 weeks ago on 9/25/2024 she got out of bed and had a twisting injury to the knee since that time she has had difficulty fully extending or fully flexing the knee.  She has a so-called locked knee.    Plan:  MRI to evaluate the knee for probable bucket-handle tear causing a locked knee  Follow-up after the MRI is completed  Nonsteroidal anti-inflammatories for pain  Continue to use the knee immobilizer.

## 2024-10-09 NOTE — PROGRESS NOTES
Assessment/Plan   Encounter Diagnoses:  Knee injuries, right, initial encounter  Knee injuries, right, initial encounter  Assessment: Reinjury of a previously injured knee where she had a meniscal repair and subsequent meniscectomy.  About 2 weeks ago on 9/25/2024 she got out of bed and had a twisting injury to the knee since that time she has had difficulty fully extending or fully flexing the knee.  She has a so-called locked knee.    Plan:  MRI to evaluate the knee for probable bucket-handle tear causing a locked knee  Follow-up after the MRI is completed  Nonsteroidal anti-inflammatories for pain  Continue to use the knee immobilizer.       Subjective    Patient ID: Jessi Alvarado is a 24 y.o. female.    Chief Complaint: Pain of the Right Knee     Last Surgery: No surgery found  Last Surgery Date: No surgery found    HPI  24-year-old who comes in today with a locked right knee.    OBJECTIVE: ORTHO EXAM  Right knee:  Skin healthy and intact  Minimal swelling without ecchymosis   alignment: Neutral but difficult to assess because her knee is held in 30 to 45 degrees of flexion.    Effusion: Scant  ROM: 30 to 60 degrees she has guarding at the extremes  Crepitance with range of motion  No pain with internal rotation of the hip  Tenderness to palpation: Globally especially on the medial joint line and in the popliteal fossa     No laxity to valgus stress  No laxity to varus stress  Negative Lachman´s test  Negative posterior drawer test  Mild pain with Agueda´s test     Neurovascular exam normal distally  2+ DP pulse and good cap refill    IMAGE RESULTS:  Point of Care Ultrasound  These images are not reportable by radiology and will not be interpreted   by  Radiologists.      ULTRASOUND  DIAGNOSTIC ULTRASOUND REPORT FINAL: [LEFT] KNEE  Sonographer: Ed Maynard MD  Indication: Knee Pain  Procedure: Ultrasound, extremity, nonvascular, real-time, COMPLETE, anatomic specific  Technique: B-Mode Ultrasound  Examination performed using 6- 9 MHz linear transducer with [employer]  STUDY TYPE:   1. ULTRASOUND EXTREMITY  2. REAL TIME WITH IMAGE DOCUMENTATION  3. NON-VASCULAR  4. COMPLETE STUDY, INCLUDING BUT NOT LIMITED TO MUSCLE, TENDONS, LIGAMENTS, SOFT TISSUES, ADIPOSE TISSUE AND SUBCUTANEOUS TISSUE.  Site: KNEE   Live ultrasound was performed with of patient's  KNEE and PERMANENTLY documented. I personally performed the ultrasound and reviewed the findings. These show:    The exam was limited because she could only maintain 30 to 40 degrees of flexion.  She had no obvious effusion.  Quadriceps tendon and the patellar tendon were both intact.    Procedures     Orders Placed This Encounter    Point of Care Ultrasound    MR knee right wo IV contrast

## 2024-10-10 ENCOUNTER — OFFICE VISIT (OUTPATIENT)
Dept: ORTHOPEDIC SURGERY | Facility: CLINIC | Age: 24
End: 2024-10-10
Payer: COMMERCIAL

## 2024-10-10 VITALS
HEART RATE: 100 BPM | DIASTOLIC BLOOD PRESSURE: 65 MMHG | BODY MASS INDEX: 18.36 KG/M2 | SYSTOLIC BLOOD PRESSURE: 104 MMHG | RESPIRATION RATE: 20 BRPM | TEMPERATURE: 97.7 F | WEIGHT: 117.2 LBS

## 2024-10-10 DIAGNOSIS — M25.561 ACUTE PAIN OF RIGHT KNEE: ICD-10-CM

## 2024-10-10 RX ORDER — OXYCODONE AND ACETAMINOPHEN 5; 325 MG/1; MG/1
1 TABLET ORAL EVERY 6 HOURS PRN
Qty: 28 TABLET | Refills: 0 | Status: SHIPPED | OUTPATIENT
Start: 2024-10-10 | End: 2024-10-20

## 2024-10-10 RX ORDER — ASPIRIN 81 MG/1
81 TABLET ORAL DAILY
Qty: 30 TABLET | Refills: 1 | Status: SHIPPED | OUTPATIENT
Start: 2024-10-10 | End: 2024-12-09

## 2024-10-10 ASSESSMENT — PAIN DESCRIPTION - DESCRIPTORS: DESCRIPTORS: SHARP

## 2024-10-10 ASSESSMENT — PAIN SCALES - GENERAL: PAINLEVEL_OUTOF10: 8

## 2024-10-10 NOTE — H&P (VIEW-ONLY)
Assessment/Plan   Encounter Diagnoses:  Acute pain of right knee  Knee injuries, right, initial encounter  Assessment: Reinjury of a previously injured knee where she had a meniscal repair and subsequent meniscectomy.  About 2 weeks ago on 9/25/2024 she got out of bed and had a twisting injury to the knee since that time she has had difficulty fully extending or fully flexing the knee.  She has a so-called locked knee.  Lateral Meniscal tear Bucket handle type       Plan:      H+P for surgery on 10/14/24.  Pre-op testing   Hx of Mitral valve prolapse  Percocet 5 mg  28 tablets given.  Continue to use the knee immobilizer.       Subjective    Patient ID: Jessi Alvarado is a 24 y.o. female.    Chief Complaint: Pain of the Right Knee     Last Surgery: No surgery found  Last Surgery Date: No surgery found    HPI  24-year-old who had a twisting injury on 9/25/2024 to the right knee.  She has a bucket-handle tear of the lateral meniscus.  Her knee is locked.  She would benefit from an arthroscopy and partial lateral meniscectomy.  She may have some injury pattern to the medial meniscus and possible partial medial meniscectomy will be necessary.  She previously had a meniscal repair and partial meniscectomy surgery on this knee.  She has some mitral valve prolapse and is on a med for this.  However she recently had surgery on her arm to remove her birth control implant and tolerated this well.    OBJECTIVE: ORTHO EXAM    Right  knee:  Skin healthy and intact  No gross swelling or ecchymosis  Alignment: Neutral  Effusion: Mild  ROM: 30 degrees Extension   45 degrees Flexion  Minimal crepitance with range of motion  No pain with internal rotation of the hip  Tenderness to palpation: Both joint lines medial and lateral.     No laxity to valgus stress  No laxity to varus stress  Negative Lachman´s test-knee is locked  Negative posterior drawer test  Mild pain with Agueda´s test     Neurovascular exam normal  distally  2+ DP pulse and good cap refill    IMAGE RESULTS:  MR knee right wo IV contrast  Narrative: Interpreted By:  Justin Mckeon and Stephens Katherine   STUDY:  MRI of the  right knee without IV contrast;  10/9/2024 10:40 am      INDICATION:  Signs/Symptoms:r/o right meniscal tear.      ,S89.91XA Unspecified injury of right lower leg, initial encounter      COMPARISON:  MRI 10/05/2021 and 01/21/2020      ACCESSION NUMBER(S):  [id number]      ORDERING CLINICIAN:  JACOBO POWELL      TECHNIQUE:  MR imaging of the  right knee was obtained  without IV contrast.      FINDINGS:  LIGAMENTS AND TENDONS:  The anterior cruciate ligament and the posterior cruciate ligaments  are intact. The medial collateral ligament is intact. The lateral  collateral ligament, the biceps femoris tendon, the popliteus tendon  and the iliotibial band are intact. The quadriceps tendon and the  patellar tendon are intact.      MENISCI:  The medial meniscus is intact and without evidence of tear.  There is a complex Bucket-handle type tear of the lateral meniscus  with the posterior horn and body flipped anteriorly position adjacent  to the anterior horn of the medial meniscus.      JOINTS:  The articular cartilage of the medial femoral condyle and medial  tibial plateau is intact and without evidence of full thickness  defect. The articular cartilage of the lateral femoral condyle and  lateral tibial plateau is intact and without evidence of full  thickness defect. The patellofemoral articulation is intact and  without evidence of subluxation or articular cartilage defect. No  evidence of significant joint effusion or popliteal cyst.      OSSEOUS STRUCTURES:  No focal marrow replacing lesions are identified. There is no  fracture.      SOFT TISSUES:  There is no muscle atrophy or tear.  The common peroneal nerve is intact.      Impression: Complex vertical longitudinal tear of the lateral meniscus with the  posterior horn and body flipped  anteriorly      I personally reviewed the images/study and I agree with Rosa Paz DO's (radiology resident) findings as stated. This study  was interpreted at [facility],  [place], [place].      MACRO:  None      Signed by: Justin Mckeon 10/9/2024 4:14 PM  Dictation workstation:   [id number]  Point of Care Ultrasound  These images are not reportable by radiology and will not be interpreted   by  Radiologists.      ULTRASOUND  None    Procedures     Orders Placed This Encounter    Point of Care Ultrasound

## 2024-10-10 NOTE — ASSESSMENT & PLAN NOTE
Assessment: Reinjury of a previously injured knee where she had a meniscal repair and subsequent meniscectomy.  About 2 weeks ago on 9/25/2024 she got out of bed and had a twisting injury to the knee since that time she has had difficulty fully extending or fully flexing the knee.  She has a so-called locked knee.  Lateral Meniscal tear Bucket handle type       Plan:      H+P for surgery on 10/14/24.  Pre-op testing   Hx of Mitral valve prolapse  Percocet 5 mg  28 tablets given.  Continue to use the knee immobilizer.

## 2024-10-10 NOTE — PROGRESS NOTES
Assessment/Plan   Encounter Diagnoses:  Acute pain of right knee  Knee injuries, right, initial encounter  Assessment: Reinjury of a previously injured knee where she had a meniscal repair and subsequent meniscectomy.  About 2 weeks ago on 9/25/2024 she got out of bed and had a twisting injury to the knee since that time she has had difficulty fully extending or fully flexing the knee.  She has a so-called locked knee.  Lateral Meniscal tear Bucket handle type       Plan:      H+P for surgery on 10/14/24.  Pre-op testing   Hx of Mitral valve prolapse  Percocet 5 mg  28 tablets given.  Continue to use the knee immobilizer.       Subjective    Patient ID: Jessi Alvarado is a 24 y.o. female.    Chief Complaint: Pain of the Right Knee     Last Surgery: No surgery found  Last Surgery Date: No surgery found    HPI  24-year-old who had a twisting injury on 9/25/2024 to the right knee.  She has a bucket-handle tear of the lateral meniscus.  Her knee is locked.  She would benefit from an arthroscopy and partial lateral meniscectomy.  She may have some injury pattern to the medial meniscus and possible partial medial meniscectomy will be necessary.  She previously had a meniscal repair and partial meniscectomy surgery on this knee.  She has some mitral valve prolapse and is on a med for this.  However she recently had surgery on her arm to remove her birth control implant and tolerated this well.    OBJECTIVE: ORTHO EXAM    Right  knee:  Skin healthy and intact  No gross swelling or ecchymosis  Alignment: Neutral  Effusion: Mild  ROM: 30 degrees Extension   45 degrees Flexion  Minimal crepitance with range of motion  No pain with internal rotation of the hip  Tenderness to palpation: Both joint lines medial and lateral.     No laxity to valgus stress  No laxity to varus stress  Negative Lachman´s test-knee is locked  Negative posterior drawer test  Mild pain with Agueda´s test     Neurovascular exam normal  distally  2+ DP pulse and good cap refill    IMAGE RESULTS:  MR knee right wo IV contrast  Narrative: Interpreted By:  Justin Mckeon and Stephens Katherine   STUDY:  MRI of the  right knee without IV contrast;  10/9/2024 10:40 am      INDICATION:  Signs/Symptoms:r/o right meniscal tear.      ,S89.91XA Unspecified injury of right lower leg, initial encounter      COMPARISON:  MRI 10/05/2021 and 01/21/2020      ACCESSION NUMBER(S):  [id number]      ORDERING CLINICIAN:  JACOBO POWELL      TECHNIQUE:  MR imaging of the  right knee was obtained  without IV contrast.      FINDINGS:  LIGAMENTS AND TENDONS:  The anterior cruciate ligament and the posterior cruciate ligaments  are intact. The medial collateral ligament is intact. The lateral  collateral ligament, the biceps femoris tendon, the popliteus tendon  and the iliotibial band are intact. The quadriceps tendon and the  patellar tendon are intact.      MENISCI:  The medial meniscus is intact and without evidence of tear.  There is a complex Bucket-handle type tear of the lateral meniscus  with the posterior horn and body flipped anteriorly position adjacent  to the anterior horn of the medial meniscus.      JOINTS:  The articular cartilage of the medial femoral condyle and medial  tibial plateau is intact and without evidence of full thickness  defect. The articular cartilage of the lateral femoral condyle and  lateral tibial plateau is intact and without evidence of full  thickness defect. The patellofemoral articulation is intact and  without evidence of subluxation or articular cartilage defect. No  evidence of significant joint effusion or popliteal cyst.      OSSEOUS STRUCTURES:  No focal marrow replacing lesions are identified. There is no  fracture.      SOFT TISSUES:  There is no muscle atrophy or tear.  The common peroneal nerve is intact.      Impression: Complex vertical longitudinal tear of the lateral meniscus with the  posterior horn and body flipped  anteriorly      I personally reviewed the images/study and I agree with Rosa Paz DO's (radiology resident) findings as stated. This study  was interpreted at [facility],  [place], [place].      MACRO:  None      Signed by: Justin Mckeon 10/9/2024 4:14 PM  Dictation workstation:   [id number]  Point of Care Ultrasound  These images are not reportable by radiology and will not be interpreted   by  Radiologists.      ULTRASOUND  None    Procedures     Orders Placed This Encounter    Point of Care Ultrasound

## 2024-10-11 ENCOUNTER — LAB (OUTPATIENT)
Facility: LAB | Age: 24
End: 2024-10-11
Payer: COMMERCIAL

## 2024-10-11 DIAGNOSIS — Z01.818 PRE-OP EVALUATION: ICD-10-CM

## 2024-10-11 LAB
ANION GAP SERPL CALC-SCNC: 10 MMOL/L (ref 10–20)
BUN SERPL-MCNC: 12 MG/DL (ref 6–23)
CALCIUM SERPL-MCNC: 9.8 MG/DL (ref 8.6–10.3)
CHLORIDE SERPL-SCNC: 102 MMOL/L (ref 98–107)
CO2 SERPL-SCNC: 31 MMOL/L (ref 21–32)
CREAT SERPL-MCNC: 0.75 MG/DL (ref 0.5–1.05)
EGFRCR SERPLBLD CKD-EPI 2021: >90 ML/MIN/1.73M*2
ERYTHROCYTE [DISTWIDTH] IN BLOOD BY AUTOMATED COUNT: 11.3 % (ref 11.5–14.5)
GLUCOSE SERPL-MCNC: 81 MG/DL (ref 74–99)
HCT VFR BLD AUTO: 40.5 % (ref 36–46)
HGB BLD-MCNC: 13.5 G/DL (ref 12–16)
MCH RBC QN AUTO: 31.2 PG (ref 26–34)
MCHC RBC AUTO-ENTMCNC: 33.3 G/DL (ref 32–36)
MCV RBC AUTO: 94 FL (ref 80–100)
NRBC BLD-RTO: 0 /100 WBCS (ref 0–0)
PLATELET # BLD AUTO: 309 X10*3/UL (ref 150–450)
POTASSIUM SERPL-SCNC: 4.2 MMOL/L (ref 3.5–5.3)
RBC # BLD AUTO: 4.33 X10*6/UL (ref 4–5.2)
SODIUM SERPL-SCNC: 139 MMOL/L (ref 136–145)
WBC # BLD AUTO: 6.3 X10*3/UL (ref 4.4–11.3)

## 2024-10-11 PROCEDURE — 85027 COMPLETE CBC AUTOMATED: CPT

## 2024-10-11 PROCEDURE — 36415 COLL VENOUS BLD VENIPUNCTURE: CPT

## 2024-10-11 PROCEDURE — 80048 BASIC METABOLIC PNL TOTAL CA: CPT

## 2024-10-11 NOTE — PREPROCEDURE INSTRUCTIONS
No outpatient medications have been marked as taking for the 10/14/24 encounter (Hospital Encounter).                       NPO Instructions:    Do not eat any food after midnight the night before your surgery/procedure.  You may have clear liquids until TWO hours before surgery/procedure. This includes water, black tea/coffee, (no milk or cream) apple juice and electrolyte drinks (Gatorade).    Additional Instructions:     Will need  home

## 2024-10-13 RX ORDER — CEFAZOLIN SODIUM 2 G/100ML
2 INJECTION, SOLUTION INTRAVENOUS ONCE
Status: CANCELLED | OUTPATIENT
Start: 2024-10-14 | End: 2024-10-14

## 2024-10-14 ENCOUNTER — HOSPITAL ENCOUNTER (OUTPATIENT)
Facility: HOSPITAL | Age: 24
Setting detail: OUTPATIENT SURGERY
Discharge: HOME | End: 2024-10-14
Attending: SPECIALIST | Admitting: SPECIALIST
Payer: COMMERCIAL

## 2024-10-14 ENCOUNTER — ANESTHESIA EVENT (OUTPATIENT)
Dept: OPERATING ROOM | Facility: HOSPITAL | Age: 24
End: 2024-10-14
Payer: COMMERCIAL

## 2024-10-14 ENCOUNTER — ANESTHESIA (OUTPATIENT)
Dept: OPERATING ROOM | Facility: HOSPITAL | Age: 24
End: 2024-10-14
Payer: COMMERCIAL

## 2024-10-14 VITALS
DIASTOLIC BLOOD PRESSURE: 63 MMHG | TEMPERATURE: 97.3 F | RESPIRATION RATE: 16 BRPM | SYSTOLIC BLOOD PRESSURE: 97 MMHG | HEART RATE: 78 BPM | OXYGEN SATURATION: 100 %

## 2024-10-14 DIAGNOSIS — Z01.818 PRE-OP EVALUATION: ICD-10-CM

## 2024-10-14 DIAGNOSIS — S89.91XA INJURY OF KNEE, RIGHT, INITIAL ENCOUNTER: ICD-10-CM

## 2024-10-14 LAB — HCG UR QL IA.RAPID: NEGATIVE

## 2024-10-14 PROCEDURE — 2500000004 HC RX 250 GENERAL PHARMACY W/ HCPCS (ALT 636 FOR OP/ED): Performed by: ANESTHESIOLOGY

## 2024-10-14 PROCEDURE — 3700000001 HC GENERAL ANESTHESIA TIME - INITIAL BASE CHARGE: Performed by: SPECIALIST

## 2024-10-14 PROCEDURE — 81025 URINE PREGNANCY TEST: CPT | Performed by: SPECIALIST

## 2024-10-14 PROCEDURE — 2500000005 HC RX 250 GENERAL PHARMACY W/O HCPCS: Performed by: ANESTHESIOLOGY

## 2024-10-14 PROCEDURE — 2720000007 HC OR 272 NO HCPCS: Performed by: SPECIALIST

## 2024-10-14 PROCEDURE — 3600000009 HC OR TIME - EACH INCREMENTAL 1 MINUTE - PROCEDURE LEVEL FOUR: Performed by: SPECIALIST

## 2024-10-14 PROCEDURE — 3600000004 HC OR TIME - INITIAL BASE CHARGE - PROCEDURE LEVEL FOUR: Performed by: SPECIALIST

## 2024-10-14 PROCEDURE — 7100000001 HC RECOVERY ROOM TIME - INITIAL BASE CHARGE: Performed by: SPECIALIST

## 2024-10-14 PROCEDURE — 29882 ARTHRS KNE SRG MNISC RPR M/L: CPT | Performed by: SPECIALIST

## 2024-10-14 PROCEDURE — 7100000010 HC PHASE TWO TIME - EACH INCREMENTAL 1 MINUTE: Performed by: SPECIALIST

## 2024-10-14 PROCEDURE — 7100000009 HC PHASE TWO TIME - INITIAL BASE CHARGE: Performed by: SPECIALIST

## 2024-10-14 PROCEDURE — 2500000004 HC RX 250 GENERAL PHARMACY W/ HCPCS (ALT 636 FOR OP/ED): Mod: JZ | Performed by: SPECIALIST

## 2024-10-14 PROCEDURE — 3700000002 HC GENERAL ANESTHESIA TIME - EACH INCREMENTAL 1 MINUTE: Performed by: SPECIALIST

## 2024-10-14 PROCEDURE — 7100000002 HC RECOVERY ROOM TIME - EACH INCREMENTAL 1 MINUTE: Performed by: SPECIALIST

## 2024-10-14 PROCEDURE — 2500000001 HC RX 250 WO HCPCS SELF ADMINISTERED DRUGS (ALT 637 FOR MEDICARE OP): Performed by: ANESTHESIOLOGY

## 2024-10-14 RX ORDER — PROPOFOL 10 MG/ML
INJECTION, EMULSION INTRAVENOUS AS NEEDED
Status: DISCONTINUED | OUTPATIENT
Start: 2024-10-14 | End: 2024-10-15

## 2024-10-14 RX ORDER — DEXAMETHASONE SODIUM PHOSPHATE 10 MG/ML
8 INJECTION INTRAMUSCULAR; INTRAVENOUS ONCE
Status: COMPLETED | OUTPATIENT
Start: 2024-10-14 | End: 2024-10-14

## 2024-10-14 RX ORDER — CEFAZOLIN SODIUM 2 G/100ML
2 INJECTION, SOLUTION INTRAVENOUS ONCE
Status: COMPLETED | OUTPATIENT
Start: 2024-10-14 | End: 2024-10-14

## 2024-10-14 RX ORDER — MORPHINE SULFATE 4 MG/ML
INJECTION INTRAVENOUS AS NEEDED
Status: DISCONTINUED | OUTPATIENT
Start: 2024-10-14 | End: 2024-10-14 | Stop reason: HOSPADM

## 2024-10-14 RX ORDER — ONDANSETRON HYDROCHLORIDE 2 MG/ML
4 INJECTION, SOLUTION INTRAVENOUS ONCE
Status: COMPLETED | OUTPATIENT
Start: 2024-10-14 | End: 2024-10-14

## 2024-10-14 RX ORDER — MORPHINE SULFATE 4 MG/ML
2 INJECTION, SOLUTION INTRAMUSCULAR; INTRAVENOUS EVERY 5 MIN PRN
Status: DISCONTINUED | OUTPATIENT
Start: 2024-10-14 | End: 2024-10-14 | Stop reason: HOSPADM

## 2024-10-14 RX ORDER — ACETAMINOPHEN 325 MG/1
975 TABLET ORAL ONCE
Status: COMPLETED | OUTPATIENT
Start: 2024-10-14 | End: 2024-10-14

## 2024-10-14 RX ORDER — LABETALOL HYDROCHLORIDE 5 MG/ML
5 INJECTION, SOLUTION INTRAVENOUS ONCE AS NEEDED
Status: DISCONTINUED | OUTPATIENT
Start: 2024-10-14 | End: 2024-10-14 | Stop reason: HOSPADM

## 2024-10-14 RX ORDER — OXYCODONE HYDROCHLORIDE 5 MG/1
5 TABLET ORAL EVERY 4 HOURS PRN
Status: DISCONTINUED | OUTPATIENT
Start: 2024-10-14 | End: 2024-10-14 | Stop reason: HOSPADM

## 2024-10-14 RX ORDER — HYDROMORPHONE HYDROCHLORIDE 2 MG/ML
0.5 INJECTION, SOLUTION INTRAMUSCULAR; INTRAVENOUS; SUBCUTANEOUS EVERY 5 MIN PRN
Status: DISCONTINUED | OUTPATIENT
Start: 2024-10-14 | End: 2024-10-14 | Stop reason: HOSPADM

## 2024-10-14 RX ORDER — BUPIVACAINE HYDROCHLORIDE 2.5 MG/ML
INJECTION, SOLUTION INFILTRATION; PERINEURAL AS NEEDED
Status: DISCONTINUED | OUTPATIENT
Start: 2024-10-14 | End: 2024-10-14 | Stop reason: HOSPADM

## 2024-10-14 RX ORDER — MORPHINE SULFATE 4 MG/ML
4 INJECTION, SOLUTION INTRAMUSCULAR; INTRAVENOUS EVERY 5 MIN PRN
Status: DISCONTINUED | OUTPATIENT
Start: 2024-10-14 | End: 2024-10-14 | Stop reason: HOSPADM

## 2024-10-14 RX ORDER — FENTANYL CITRATE 50 UG/ML
INJECTION, SOLUTION INTRAMUSCULAR; INTRAVENOUS AS NEEDED
Status: DISCONTINUED | OUTPATIENT
Start: 2024-10-14 | End: 2024-10-15

## 2024-10-14 RX ORDER — ONDANSETRON HYDROCHLORIDE 2 MG/ML
4 INJECTION, SOLUTION INTRAVENOUS ONCE AS NEEDED
Status: COMPLETED | OUTPATIENT
Start: 2024-10-14 | End: 2024-10-14

## 2024-10-14 RX ORDER — SODIUM CHLORIDE, SODIUM LACTATE, POTASSIUM CHLORIDE, CALCIUM CHLORIDE 600; 310; 30; 20 MG/100ML; MG/100ML; MG/100ML; MG/100ML
100 INJECTION, SOLUTION INTRAVENOUS CONTINUOUS
Status: DISCONTINUED | OUTPATIENT
Start: 2024-10-14 | End: 2024-10-14 | Stop reason: HOSPADM

## 2024-10-14 RX ORDER — MIDAZOLAM HYDROCHLORIDE 1 MG/ML
1 INJECTION INTRAMUSCULAR; INTRAVENOUS ONCE
Status: COMPLETED | OUTPATIENT
Start: 2024-10-14 | End: 2024-10-14

## 2024-10-14 ASSESSMENT — PAIN SCALES - GENERAL
PAINLEVEL_OUTOF10: 2
PAINLEVEL_OUTOF10: 8
PAINLEVEL_OUTOF10: 6
PAINLEVEL_OUTOF10: 8
PAINLEVEL_OUTOF10: 3
PAINLEVEL_OUTOF10: 7
PAINLEVEL_OUTOF10: 8
PAINLEVEL_OUTOF10: 5 - MODERATE PAIN
PAINLEVEL_OUTOF10: 8
PAINLEVEL_OUTOF10: 8
PAINLEVEL_OUTOF10: 5 - MODERATE PAIN
PAINLEVEL_OUTOF10: 6

## 2024-10-14 ASSESSMENT — PAIN - FUNCTIONAL ASSESSMENT
PAIN_FUNCTIONAL_ASSESSMENT: 0-10

## 2024-10-14 ASSESSMENT — PAIN DESCRIPTION - ORIENTATION
ORIENTATION: RIGHT
ORIENTATION: RIGHT

## 2024-10-14 ASSESSMENT — COLUMBIA-SUICIDE SEVERITY RATING SCALE - C-SSRS
1. IN THE PAST MONTH, HAVE YOU WISHED YOU WERE DEAD OR WISHED YOU COULD GO TO SLEEP AND NOT WAKE UP?: NO
2. HAVE YOU ACTUALLY HAD ANY THOUGHTS OF KILLING YOURSELF?: NO
6. HAVE YOU EVER DONE ANYTHING, STARTED TO DO ANYTHING, OR PREPARED TO DO ANYTHING TO END YOUR LIFE?: NO

## 2024-10-14 ASSESSMENT — PAIN DESCRIPTION - LOCATION
LOCATION: KNEE
LOCATION: KNEE

## 2024-10-14 NOTE — OP NOTE
Repair Arthroscopy Knee Meniscus (R) Operative Note     Date: 10/14/2024  OR Location: [facility] OR    Name: Jessi Alvarado, : 2000, Age: 24 y.o., MRN: [id number], Sex: female    Diagnosis  Pre-op Diagnosis      * Injury of knee, right, initial encounter [S89.91XA] Post-op Diagnosis     * Injury of knee, right, initial encounter [S89.91XA]     Procedures  Repair Arthroscopy Knee Meniscus  52325 - AR ARTHROSCOPY KNEE W/MENISCUS RPR MEDIAL/LATERAL      Surgeons      * Ed Maynard - Primary    Resident/Fellow/Other Assistant:  Surgeons and Role:  * No surgeons found with a matching role *    Procedure Summary  Anesthesia: Anesthesia type not filed in the log.  ASA: ASA status not filed in the log.  Anesthesia Staff: Anesthesiologist: Sander Stockton MD  Estimated Blood Loss: 0 mL  Intra-op Medications:   Administrations occurring from 1430 to 1550 on 10/14/24:   Medication Name Total Dose   BUPivacaine HCl (Marcaine) 0.25 % (2.5 mg/mL) injection 10 mL   EPINEPHrine (Adrenalin) 1 mg in lactated Ringer's 3,000 mL irrigation 1 mg   morphine injection 4 mg              Anesthesia Record               Intraprocedure I/O Totals       None           Specimen: No specimens collected     Staff:   Circulator: Catalina Malik Person: Tong         Drains and/or Catheters: * None in log *    Tourniquet Times:   * Missing tourniquet times found for documented tourniquets in lo *     Implants:     Findings: Lateral meniscal tear through prior meniscal repair area with bucket handle deformity involving posterior 60% of meniscus.  Partial medial meniscal tear.    Indications: Jessi Alvarado is an 24 y.o. female who is having surgery for Injury of knee, right, initial encounter [S89.91XA].     The patient was seen in the preoperative area. The risks, benefits, complications, treatment options, non-operative alternatives, expected recovery and outcomes were discussed with the patient. The possibilities of  reaction to medication, pulmonary aspiration, injury to surrounding structures, bleeding, recurrent infection, the need for additional procedures, failure to diagnose a condition, and creating a complication requiring transfusion or operation were discussed with the patient. The patient concurred with the proposed plan, giving informed consent.  The site of surgery was properly noted/marked if necessary per policy. The patient has been actively warmed in preoperative area. Preoperative antibiotics have been ordered and given within 1 hours of incision. Venous thrombosis prophylaxis have been ordered including bilateral sequential compression devices    Procedure Details: Operative Procedure:    The patient was identified and brought to the operating room and placed in supine position on the operating room table.  A surgical time in and then a surgical timeout were performed.  We confirmed this was the correct patient.  The correct limb.  At all the equipment necessary for the proposed procedure was available.  The patient received appropriate antibiotics  just prior to making the incision.  The risks and benefits of the proposed procedure have been fully explained to the patient and documented and they understand these.  The limb was sterilely prepped and draped in the usual fashion.    Horizontal medial and lateral parapatellar portals were established with a 11-scalpel.  The superior lateral outflow was established and a Varess needle was utilized.  The scope was placed in the suprapatellar pouch.  Significant synovitis was noted.  The medial gutter showed minimal osteophytes.    The medial compartment was mainly within normal limits. Some minimal chondrosis was noted.   The lateral compartment was visualized and f the posterior meniscus was found to be torn with a bucket-handle deformity subluxed into the center of the joint in the intercondylar notch.  This was through the area where a previous meniscal repair  had been performed.  The popliteal hiatus was involved as the meniscal repair bracketed the popliteal hiatus.     The intercondylar notch was investigated and the ACL and PCL were found to be within normal limits.  There was some hypertrophic ligamentum that was debrided in order to visualize the ACL and PCL.    The trochlear notch and patellar articulation were investigated.  There was mild lateral tilt seen.  The undersurface of the patella was investigated there was some minimal to no chondrosis noted on the undersurface of the patella or in the trochlear notch area.      Attention was turned back to the lateral compartment where the posterior horn was transected to free the meniscal tear up and then anterior laterally the meniscus was sculpted back to the tear at the other apex of the bucket-handle.  The meniscus was then removed from the knee.  The borders of the resection were sculpted with hand-held and motorized instrumentation and using the bipolar electrocautery until the anterior horn of the meniscus laterally was felt to be well sculpted and secured.  The posterior 60% of the lateral meniscus was debrided and a subtotal lateral meniscectomy.    Medially, there was some fraying of the posterior horn of the medial meniscus where previous partial meniscectomy appears to have been performed.  This was debrided back to a stable border.  The majority of the medial meniscus was preserved and appeared to be in good condition.    The suprapatellar pouch was cleaned of all debris,     The intra-articular knee was injected with 4 mg of morphine sulfate.  The subcutaneous tissues around the portals were injected with approximately 3 cc each of quarter percent Marcaine.  A total of 10 cc were utilized.    No tourniquet was utilized.    Xeroform dressings were placed over the portals sterile 4 x 4's were placed over the Xeroform's and ABD pads were added.  A Polar Care was applied and then an Ace wrap was applied.  A  knee immobilizer was applied.  Patient tolerated the procedure well and returned to the recovery room in stable condition.    The patient was instructed to take aspirin daily for a month after the surgery to help prevent DVT.  Complications:  None; patient tolerated the procedure well.    Disposition: PACU - hemodynamically stable.  Condition: stable         Additional Details:     Attending Attestation:     Ed Maynard  Phone Number: [phone]

## 2024-10-14 NOTE — ANESTHESIA PREPROCEDURE EVALUATION
Patient: Jessi Alvarado    Procedure Information       Anesthesia Start Date/Time: 10/14/24 1400    Procedure: Repair Arthroscopy Knee Meniscus (Right: Knee)    Location: [facility] OR 01 / Virtual [facility] OR    Surgeons: Ed Maynard MD            Relevant Problems   Cardiac   (+) MVP (mitral valve prolapse)      Pulmonary   (+) Mild persistent asthma without complication (HHS-HCC)      Neuro   (+) Anxiety   (+) Bad dreams      GI   (+) Irritable bowel syndrome with mixed bowel habits      GYN   (+) Fibroid uterus   (+) Menorrhagia with irregular cycle       Clinical information reviewed:   Tobacco  Allergies  Meds   Med Hx  Surg Hx  OB Status  Fam Hx  Soc   Hx        NPO Detail:  NPO/Void Status  Carbohydrate Drink Given Prior to Surgery? : N  Date of Last Liquid: 10/13/24  Time of Last Liquid: 2359  Date of Last Solid: 10/13/24  Time of Last Solid: 2359  Last Intake Type: Clear fluids; Food         Physical Exam    Airway  Mallampati: II  TM distance: >3 FB  Neck ROM: full     Cardiovascular   Rhythm: regular  Rate: normal     Dental    Pulmonary    Abdominal          Anesthesia Plan    History of general anesthesia?: yes  History of complications of general anesthesia?: no    ASA 2     general     intravenous induction   Anesthetic plan and risks discussed with patient.

## 2024-10-14 NOTE — ANESTHESIA PROCEDURE NOTES
Airway  Date/Time: 10/14/2024 2:03 PM  Urgency: elective      Staffing  Performed: FELICIA   Authorized by: Sander Stockton MD    Performed by: Sander Stockton MD  Patient location during procedure: OR    Indications and Patient Condition  Indications for airway management: anesthesia      Final Airway Details  Final airway type: supraglottic airway      Successful airway: Size 4

## 2024-10-15 ENCOUNTER — APPOINTMENT (OUTPATIENT)
Dept: PRIMARY CARE | Facility: CLINIC | Age: 24
End: 2024-10-15
Payer: COMMERCIAL

## 2024-10-15 NOTE — ANESTHESIA POSTPROCEDURE EVALUATION
Patient: Jessi Alvarado    Procedure Summary       Date: 10/14/24 Room / Location: [facility] OR 01 / Virtual [facility] OR    Anesthesia Start: 1400 Anesthesia Stop: 1505    Procedure: Repair Arthroscopy Knee Meniscus (Right: Knee) Diagnosis:       Injury of knee, right, initial encounter      (Injury of knee, right, initial encounter [S89.91XA])    Surgeons: Ed Maynard MD Responsible Provider: Sander Stockton MD    Anesthesia Type: general ASA Status: 2            Anesthesia Type: No value filed.    Vitals Value Taken Time   /55 10/14/24 1610   Temp 36.3 °C (97.3 °F) 10/14/24 1610   Pulse 64 10/14/24 1610   Resp 12 10/14/24 1610   SpO2 100 % 10/14/24 1610       Anesthesia Post Evaluation    Patient location during evaluation: bedside  Patient participation: complete - patient participated  Level of consciousness: sleepy but conscious  Pain management: adequate  Airway patency: patent  Cardiovascular status: acceptable  Respiratory status: acceptable  Hydration status: acceptable  Postoperative Nausea and Vomiting: none    No notable events documented.

## 2024-10-23 ENCOUNTER — HOSPITAL ENCOUNTER (OUTPATIENT)
Dept: RADIOLOGY | Facility: CLINIC | Age: 24
Discharge: HOME | End: 2024-10-23
Payer: COMMERCIAL

## 2024-10-23 ENCOUNTER — HOSPITAL ENCOUNTER (OUTPATIENT)
Dept: RADIOLOGY | Facility: EXTERNAL LOCATION | Age: 24
Discharge: HOME | End: 2024-10-23

## 2024-10-23 ENCOUNTER — APPOINTMENT (OUTPATIENT)
Dept: ORTHOPEDIC SURGERY | Facility: CLINIC | Age: 24
End: 2024-10-23
Payer: COMMERCIAL

## 2024-10-23 DIAGNOSIS — G89.18 POSTOPERATIVE PAIN OF LEFT KNEE: ICD-10-CM

## 2024-10-23 DIAGNOSIS — M23.91 INTERNAL DERANGEMENT OF KNEE JOINT, RIGHT: ICD-10-CM

## 2024-10-23 DIAGNOSIS — M25.562 POSTOPERATIVE PAIN OF LEFT KNEE: ICD-10-CM

## 2024-10-23 PROCEDURE — 1036F TOBACCO NON-USER: CPT | Performed by: SPECIALIST

## 2024-10-23 PROCEDURE — 76882 US LMTD JT/FCL EVL NVASC XTR: CPT | Performed by: SPECIALIST

## 2024-10-23 PROCEDURE — 99024 POSTOP FOLLOW-UP VISIT: CPT | Performed by: SPECIALIST

## 2024-10-23 PROCEDURE — 73560 X-RAY EXAM OF KNEE 1 OR 2: CPT | Mod: RIGHT SIDE | Performed by: RADIOLOGY

## 2024-10-23 PROCEDURE — 73560 X-RAY EXAM OF KNEE 1 OR 2: CPT | Mod: RT

## 2024-10-23 ASSESSMENT — PAIN - FUNCTIONAL ASSESSMENT: PAIN_FUNCTIONAL_ASSESSMENT: 0-10

## 2024-10-23 ASSESSMENT — PAIN SCALES - GENERAL: PAINLEVEL_OUTOF10: 8

## 2024-10-23 ASSESSMENT — PAIN DESCRIPTION - DESCRIPTORS: DESCRIPTORS: TIGHTNESS

## 2024-10-23 NOTE — ASSESSMENT & PLAN NOTE
Assessment:   status post right knee arthroscopy from a 10/14/2024 surgery.  She is 9 days status post surgery.    Plan:  Continue taking the aspirin for at least another month.  I showed her how to do straight leg raising and knee extension exercises and she will begin these.  Follow-up in 3 to 4 weeks for reevaluation.  She did not need further pain medication.

## 2024-10-23 NOTE — PROGRESS NOTES
Assessment/Plan   Encounter Diagnoses:  Internal derangement of knee joint, right    Postoperative pain of left knee  Knee injuries, right, initial encounter  Assessment: 9 days status post 10/14/2024 right knee meniscal tear with locked knee.    Plan:  Physical therapy  Pain medications  Weightbearing  Follow-up       Subjective    Patient ID: Jessi Alvarado is a 24 y.o. female.    Chief Complaint: Post-op of the Right Knee (Sx,- RIGHT KNEE MENISCAL REPAIR 10/14/2024)     Last Surgery: Repair Arthroscopy Knee Meniscus - Right  Last Surgery Date: 10/14/2024    HPI  24-year-old female 9 days status post partial lateral and medial meniscectomies.  She had previously had a repair of her lateral meniscus.  She read tore through the repaired areas and had a bucket-handle tear that was locking her knee.  This was no longer repairable so a subtotal lateral meniscectomy was performed.    OBJECTIVE: ORTHO EXAM  Right knee exam  The horizontal portals were sealed and healing well.  Her knee was no longer locked and with encouragement she got nearly full extension.  She holds her knee in about 15 to 20 degrees of flexion for comfort.  I encouraged her to straighten her knee is much as possible.  Her calves are supple and nontender bilaterally.  Her thigh was supple and nontender.  Her knee had minimal effusion and minimal calor.  There was no erythema.  She is neurovascularly intact distally.    IMAGE RESULTS:  Point of Care Ultrasound  These images are not reportable by radiology and will not be interpreted   by  Radiologists.      ULTRASOUND  DIAGNOSTIC ULTRASOUND REPORT FINAL: Right KNEE  Sonographer: Ed Maynard MD  Indication: Knee Pain  Procedure: Ultrasound, extremity, nonvascular, real-time, COMPLETE, anatomic specific  Technique: B-Mode Ultrasound Examination performed using 6- 9 MHz linear transducer with [employer]  STUDY TYPE:   1. ULTRASOUND EXTREMITY  2. REAL TIME WITH IMAGE DOCUMENTATION  3.  NON-VASCULAR  4. COMPLETE STUDY, INCLUDING BUT NOT LIMITED TO MUSCLE, TENDONS, LIGAMENTS, SOFT TISSUES, ADIPOSE TISSUE AND SUBCUTANEOUS TISSUE.  Site: KNEE   Live ultrasound was performed with of patient's  KNEE and PERMANENTLY documented. I personally performed the ultrasound and reviewed the findings. These show:    Marlee-articular evaluation:   An intact Quadriceps Tendon with the Quadriceps Muscle fibers showing normal striations Quadriceps Tendon demonstrating normal fibrillar pattern. . The Patellar Tendon demonstrates normal fibrillar pattern and is intact.   No significant soft tissue fluid collection/abscess appreciated.     Joint Evaluation:  The lateral joint line shows an intact LCL.   Medial joint line exam shows an intact MCL.   The patellar tendon was within normal limits.  Scant to minimal joint effusion noted.     The patient tolerated the procedure well.        Procedures     Orders Placed This Encounter    Point of Care Ultrasound    Referral to Physical Therapy

## 2024-10-23 NOTE — ASSESSMENT & PLAN NOTE
Assessment: 9 days status post 10/14/2024 right knee meniscal tear with locked knee.    Plan:  Physical therapy  Pain medications  Weightbearing  Follow-up

## 2024-10-24 ENCOUNTER — TELEMEDICINE (OUTPATIENT)
Dept: PRIMARY CARE | Facility: CLINIC | Age: 24
End: 2024-10-24
Payer: COMMERCIAL

## 2024-10-24 VITALS — WEIGHT: 117 LBS | BODY MASS INDEX: 18.36 KG/M2 | HEIGHT: 67 IN

## 2024-10-24 DIAGNOSIS — R11.0 NAUSEA: ICD-10-CM

## 2024-10-24 DIAGNOSIS — R52 BODY ACHES: ICD-10-CM

## 2024-10-24 DIAGNOSIS — R53.83 FATIGUE, UNSPECIFIED TYPE: ICD-10-CM

## 2024-10-24 DIAGNOSIS — R51.9 ACUTE NONINTRACTABLE HEADACHE, UNSPECIFIED HEADACHE TYPE: ICD-10-CM

## 2024-10-24 DIAGNOSIS — R19.7 DIARRHEA, UNSPECIFIED TYPE: Primary | ICD-10-CM

## 2024-10-24 PROCEDURE — 1036F TOBACCO NON-USER: CPT | Performed by: INTERNAL MEDICINE

## 2024-10-24 PROCEDURE — 99214 OFFICE O/P EST MOD 30 MIN: CPT | Performed by: INTERNAL MEDICINE

## 2024-10-24 PROCEDURE — 3008F BODY MASS INDEX DOCD: CPT | Performed by: INTERNAL MEDICINE

## 2024-10-24 RX ORDER — METRONIDAZOLE 500 MG/1
500 TABLET ORAL 3 TIMES DAILY
Qty: 21 TABLET | Refills: 0 | Status: SHIPPED | OUTPATIENT
Start: 2024-10-24 | End: 2024-10-31

## 2024-10-24 RX ORDER — PREDNISONE 5 MG/1
5 TABLET ORAL DAILY
Qty: 7 TABLET | Refills: 0 | Status: SHIPPED | OUTPATIENT
Start: 2024-10-24 | End: 2024-10-31

## 2024-10-24 RX ORDER — COVID-19 ANTIGEN TEST
1 KIT MISCELLANEOUS ONCE
Qty: 1 KIT | Refills: 0 | Status: SHIPPED | OUTPATIENT
Start: 2024-10-24 | End: 2024-10-24

## 2024-10-24 RX ORDER — ONDANSETRON 4 MG/1
4 TABLET, FILM COATED ORAL 4 TIMES DAILY PRN
Qty: 20 TABLET | Refills: 0 | Status: SHIPPED | OUTPATIENT
Start: 2024-10-24 | End: 2024-10-31

## 2024-10-24 ASSESSMENT — ENCOUNTER SYMPTOMS
VOMITING: 0
SHORTNESS OF BREATH: 0
RHINORRHEA: 0
CARDIOVASCULAR NEGATIVE: 1
DYSURIA: 0
LIGHT-HEADEDNESS: 0
NAUSEA: 1
COUGH: 0
PALPITATIONS: 0
MYALGIAS: 1
HEADACHES: 1
WEAKNESS: 0
FEVER: 0
ENDOCRINE NEGATIVE: 1
PSYCHIATRIC NEGATIVE: 1
EYES NEGATIVE: 1
AGITATION: 0
WHEEZING: 0
CHILLS: 1
DIARRHEA: 1
ABDOMINAL PAIN: 0
DIZZINESS: 0
BACK PAIN: 0
CONSTIPATION: 0
ABDOMINAL DISTENTION: 0

## 2024-10-24 NOTE — PROGRESS NOTES
Subjective   Patient ID: Jessi Alvarado is a 24 y.o. female who presents for Illness (VIRTUAL VISIT - C/O NAUSEA, DIARRHEA, BODY ACHES, CHILLS, BRUNNER. SYMPTOMS STARTED LAST THURSDAY AND RESOLVED AFTER A COUPLE DAYS, BUT THEN SYMPTOMS RETURNED AGAIN YESTERDAY. ).  Illness  Associated symptoms include headaches, diarrhea and nausea. Pertinent negatives include no congestion, rhinorrhea, fever, chest pain, coughing, shortness of breath, wheezing, abdominal pain, constipation, vomiting or rash.     VIRTUAL VISIT  WATERY DIARRHEA , HEADACHE 6-7/10 WITH FATIGUE, NAUSEA, ,CHILLS ,BODY ACHE ON AND OFF X 1 WEEK. SYMPTOMS STARTED FEW DAYS AFTER THE HOSPITAL DISCHARGE FOR R KNEE ARTHROSCOPIC SURGERY. NO ABDOMINAL PAIN, NO SOB OR COUGH.  Review of Systems   Constitutional:  Positive for chills. Negative for fever.   HENT: Negative.  Negative for congestion, postnasal drip and rhinorrhea.    Eyes: Negative.  Negative for visual disturbance.   Respiratory:  Negative for cough, shortness of breath and wheezing.    Cardiovascular: Negative.  Negative for chest pain, palpitations and leg swelling.   Gastrointestinal:  Positive for diarrhea and nausea. Negative for abdominal distention, abdominal pain, constipation and vomiting.   Endocrine: Negative.    Genitourinary:  Negative for dysuria and urgency.   Musculoskeletal:  Positive for myalgias. Negative for back pain.   Skin: Negative.  Negative for rash.   Allergic/Immunologic: Negative for immunocompromised state.   Neurological:  Positive for headaches. Negative for dizziness, weakness and light-headedness.   Psychiatric/Behavioral: Negative.  Negative for agitation.        Objective   Physical Exam  Constitutional:       General: She is not in acute distress.     Appearance: She is not ill-appearing, toxic-appearing or diaphoretic.   Neurological:      Mental Status: She is alert.         Assessment/Plan   1. Diarrhea, unspecified type  Stool Pathogen Panel, PCR    C.  difficile, PCR    COVID-19 antigen test (COVID-19 At-Home Test) kit    metroNIDAZOLE (Flagyl) 500 mg tablet      2. Acute nonintractable headache, unspecified headache type  COVID-19 antigen test (COVID-19 At-Home Test) kit    predniSONE (Deltasone) 5 mg tablet      3. Fatigue, unspecified type  COVID-19 antigen test (COVID-19 At-Home Test) kit    predniSONE (Deltasone) 5 mg tablet      4. Body aches  COVID-19 antigen test (COVID-19 At-Home Test) kit    predniSONE (Deltasone) 5 mg tablet      5. Nausea  ondansetron (Zofran) 4 mg tablet        ADVISED TO HAVE LIQUID DIET /ICY COLD GATORADE TODAY,AND ADVANCE THE DIET SLOWLY OVER SEVERAL DAYS PER  TOLERANCE. ADVISED  FOR SMALLER MEAL PORTIONS MORE FREQUENT SERVINGS . TO CUT BACK ON CAFFEINE. ADVISED TO START THE FLAGYL AFTER STOOL COLLECTION.    MDM    1) COMPLEXITY: 1 UNDIAGNOSED NEW PROBLEM WITH UNCERTAIN PROGNOSIS  2)DATA: TESTS INTERPRETED AND OR ORDERED, TOOK INDEPENDENT HISTORY OR RECORDS REVIEWED  3)RISK: MODERATE RISK DUE TO NATURE OF MEDICAL CONDITIONS/COMORBIDITY OR MEDICATIONS ORDERED OR SURGICAL OR PROCEDURE REFERRAL, .         Has F/U.  NEEDS CONTAINER FOR STOOL COLLECTION

## 2024-10-25 ENCOUNTER — TELEPHONE (OUTPATIENT)
Dept: PHYSICAL THERAPY | Facility: CLINIC | Age: 24
End: 2024-10-25
Payer: COMMERCIAL

## 2024-10-25 ENCOUNTER — LAB (OUTPATIENT)
Dept: LAB | Facility: LAB | Age: 24
End: 2024-10-25
Payer: COMMERCIAL

## 2024-10-25 DIAGNOSIS — R19.7 DIARRHEA, UNSPECIFIED TYPE: ICD-10-CM

## 2024-10-25 LAB — C DIF TOX TCDA+TCDB STL QL NAA+PROBE: NOT DETECTED

## 2024-10-25 PROCEDURE — 87506 IADNA-DNA/RNA PROBE TQ 6-11: CPT

## 2024-10-25 PROCEDURE — 87493 C DIFF AMPLIFIED PROBE: CPT

## 2024-10-25 NOTE — TELEPHONE ENCOUNTER
Patient requested specific time which is only available on scheduled day. Unable to reschedule to sooner day/time.

## 2024-10-26 LAB

## 2024-10-30 ENCOUNTER — TELEPHONE (OUTPATIENT)
Dept: PRIMARY CARE | Facility: CLINIC | Age: 24
End: 2024-10-30

## 2024-10-30 ENCOUNTER — EVALUATION (OUTPATIENT)
Dept: PHYSICAL THERAPY | Facility: CLINIC | Age: 24
End: 2024-10-30
Payer: COMMERCIAL

## 2024-10-30 DIAGNOSIS — G89.18 POSTOPERATIVE PAIN OF RIGHT KNEE: Primary | ICD-10-CM

## 2024-10-30 DIAGNOSIS — S89.91XA KNEE INJURIES, RIGHT, INITIAL ENCOUNTER: ICD-10-CM

## 2024-10-30 DIAGNOSIS — G89.18 POSTOPERATIVE PAIN OF LEFT KNEE: ICD-10-CM

## 2024-10-30 DIAGNOSIS — M25.562 POSTOPERATIVE PAIN OF LEFT KNEE: ICD-10-CM

## 2024-10-30 DIAGNOSIS — M25.561 POSTOPERATIVE PAIN OF RIGHT KNEE: Primary | ICD-10-CM

## 2024-10-30 DIAGNOSIS — Z98.890 STATUS POST MEDIAL MENISCUS REPAIR OF RIGHT KNEE: ICD-10-CM

## 2024-10-30 PROCEDURE — 97110 THERAPEUTIC EXERCISES: CPT | Mod: GP

## 2024-10-30 PROCEDURE — 97161 PT EVAL LOW COMPLEX 20 MIN: CPT | Mod: GP

## 2024-10-30 ASSESSMENT — ENCOUNTER SYMPTOMS
LOSS OF SENSATION IN FEET: 0
OCCASIONAL FEELINGS OF UNSTEADINESS: 0
DEPRESSION: 0

## 2024-10-30 ASSESSMENT — PAIN DESCRIPTION - DESCRIPTORS: DESCRIPTORS: ACHING;DULL;DISCOMFORT

## 2024-10-30 ASSESSMENT — PAIN SCALES - GENERAL: PAINLEVEL_OUTOF10: 6

## 2024-10-30 ASSESSMENT — PAIN - FUNCTIONAL ASSESSMENT: PAIN_FUNCTIONAL_ASSESSMENT: 0-10

## 2024-11-04 ENCOUNTER — DOCUMENTATION (OUTPATIENT)
Dept: PHYSICAL THERAPY | Facility: CLINIC | Age: 24
End: 2024-11-04
Payer: COMMERCIAL

## 2024-11-04 ENCOUNTER — APPOINTMENT (OUTPATIENT)
Dept: PHYSICAL THERAPY | Facility: CLINIC | Age: 24
End: 2024-11-04
Payer: COMMERCIAL

## 2024-11-04 ENCOUNTER — TREATMENT (OUTPATIENT)
Dept: PHYSICAL THERAPY | Facility: CLINIC | Age: 24
End: 2024-11-04
Payer: COMMERCIAL

## 2024-11-04 DIAGNOSIS — Z98.890 STATUS POST MEDIAL MENISCUS REPAIR OF RIGHT KNEE: ICD-10-CM

## 2024-11-04 PROCEDURE — 97116 GAIT TRAINING THERAPY: CPT | Mod: GP,CQ

## 2024-11-04 PROCEDURE — 97110 THERAPEUTIC EXERCISES: CPT | Mod: GP,CQ

## 2024-11-04 ASSESSMENT — PAIN - FUNCTIONAL ASSESSMENT: PAIN_FUNCTIONAL_ASSESSMENT: 0-10

## 2024-11-04 ASSESSMENT — PAIN DESCRIPTION - DESCRIPTORS: DESCRIPTORS: ACHING;BURNING

## 2024-11-04 ASSESSMENT — PAIN SCALES - GENERAL: PAINLEVEL_OUTOF10: 6

## 2024-11-04 NOTE — PROGRESS NOTES
Physical Therapy Treatment    Patient Name: Jessi Alvarado  MRN: [id number]  : 2000  Ed Maynard  Today's Date: 2024  Time Calculation  Start Time: 1402  Stop Time: 1450  Time Calculation (min): 48 min  PT Therapeutic Procedures Time Entry  Therapeutic Exercise Time Entry: 33  Gait Training Time Entry: 13         Assessment:   Patient identified by name & . Treatment consisted of ex's ex's and gait. Adjusted crutches at the start of her treatment due to she had no bend in elbows while using crutches. Reviewed HEP and initiated WBAT gait in // bars and with crutches. Patient had difficulty with correct gait pattern at start, but then was able to complete correct gait pattern with improved dimitri. Pain slightly increased at end of session to 7/10.      Plan:  Continue to focus on right knee ROM and strengthening and gait; WBAT for improved gait pattern and eventual gait without assistive device. Patient may benefit from adding STW to hamstrings and quad next session for improved tissue mobility. -MA   OP PT Plan  Treatment/Interventions: Blood flow restriction therapy, Cryotherapy, Dry needling, Education/ Instruction, Electrical stimulation, Gait training, Hot pack, Manual therapy, Neuromuscular re-education, Self care/ home management, Taping techniques, Therapeutic activities, Therapeutic exercises, Vasopneumatic device, Ultrasound  PT Plan: Skilled PT  PT Frequency: 2 times per week  Duration: 6 weeks  Onset Date: 10/14/24  Rehab Potential: Excellent  Plan of Care Agreement: Patient    Current Problem  Problem List Items Addressed This Visit             ICD-10-CM    Status post medial meniscus repair of right knee Z98.890       Subjective   Patient compliant with HEP. States that she has been able to go down 3 steps to a bedroom and sleep in a bed instead of the couch. Quad sets are hard for her to do.   General  Reason for Referral: postoperative pain of L knee  Referred By: Ed  "Leb  General Comment: visit 2  Visit #2  Precautions  Precautions  Precautions Comment: WBAT R LE, bilateral crutches, asthma, migraines    Pain  Pain Assessment: 0-10  0-10 (Numeric) Pain Score: 6  Pain Type: Surgical pain  Pain Location: Knee  Pain Orientation: Right  Pain Descriptors: Aching, Burning  Pain Frequency: Intermittent    Objective:     Treatments:  Therapeutic Exercises: x 33'  - SciFit, stepper, seat 18, Lv-1 x5' (N)  - supine quad sets 2x10   - heel slides 10\" x5, 10\" x5 with strap (P)   -ankle pumps 2x10   -prone lying for knee extension - reviewed for HEP  Gait: x13  Weight shifts in // bars: FWD, sideways, BWD x10 ea (N)  WBAT gait in // bars x 2 laps (N)  WBAT gait with crutches to front lobby - working on R heel strike and toe off, with knee extended during stance phase (N)  10/30/24 from evaluation: Spoke with surgeon after visit and confirmed patient had a \"resection not a repair of the meniscus on R knee The meniscus with NOT repairable.\" He states to follow typical post op knee arthroscopy protocol focusing on  ROM, strengthening and ambulation. States patient is WBAT at this time. - AB    OP EDUCATION: Patient performed all exercises with proper form during treatment session. Patient was educated on frequency and duration to perform exercises at home.     Access Code: [email or web address]  URL: [email or web address]  Date: 10/30/2024  Prepared by: Bonny Ivory    Exercises  - Supine Quad Set  - 1 x daily - 7 x weekly - 3 sets - 10 reps  - Supine Ankle Pumps  - 1 x daily - 7 x weekly - 3 sets - 10 reps  - Seated Knee Flexion AAROM  - 1 x daily - 7 x weekly - 3 sets - 10 reps    Goals:  Active       R knee goals       Patient will report full understanding and compliance with HEP in 2 weeks in order to maximize therapeutic potential.         Start:  10/30/24    Expected End:  12/29/24            Patient will demonstrate at least a 10 point improvement in outcome measure to " demonstrate improved overall quality of life and functional mobility.  6 weeks       Start:  10/30/24    Expected End:  12/29/24            STG: Patient will perform supine SLR on R LE without quad leg in 4 week.        Start:  10/30/24    Expected End:  12/29/24            Patient will demonstrate improved AROM of R knee extension motion to 0 deg (neutral) in order to improve gait mechanics. 4-6 weeks         Start:  10/30/24    Expected End:  12/29/24            Patient will demonstrate improved AROM of R knee flexion motion from 108 degrees to 140 degrees in order to achieve full knee ROM for functional mobility. 4-6 weeks         Start:  10/30/24    Expected End:  12/29/24            patient will demonstrate proper heel strike and normal gait mechanics when ambulating without assistive device. 6 weeks        Start:  10/30/24    Expected End:  12/29/24

## 2024-11-04 NOTE — PROGRESS NOTES
Physical Therapy                 Therapy Communication Note    Patient Name: Jessi Alvarado  MRN: [id number]  Department: [address]  Room: Room/bed info not found  Today's Date: 11/4/2024     Discipline: Physical Therapy    Missed Visit Reason:  pt. Canceled, rescheduled for a different time, same day -AB    Missed Time: Cancel

## 2024-11-06 ENCOUNTER — OFFICE VISIT (OUTPATIENT)
Dept: URGENT CARE | Facility: CLINIC | Age: 24
End: 2024-11-06
Payer: COMMERCIAL

## 2024-11-06 VITALS
WEIGHT: 117 LBS | BODY MASS INDEX: 18.36 KG/M2 | SYSTOLIC BLOOD PRESSURE: 124 MMHG | HEIGHT: 67 IN | OXYGEN SATURATION: 98 % | DIASTOLIC BLOOD PRESSURE: 79 MMHG | TEMPERATURE: 98.1 F | RESPIRATION RATE: 20 BRPM | HEART RATE: 113 BPM

## 2024-11-06 DIAGNOSIS — H69.93 DYSFUNCTION OF BOTH EUSTACHIAN TUBES: Primary | ICD-10-CM

## 2024-11-06 DIAGNOSIS — J30.9 ALLERGIC RHINITIS, UNSPECIFIED SEASONALITY, UNSPECIFIED TRIGGER: ICD-10-CM

## 2024-11-06 PROCEDURE — 99214 OFFICE O/P EST MOD 30 MIN: CPT | Performed by: PHYSICIAN ASSISTANT

## 2024-11-06 RX ORDER — AMOXICILLIN AND CLAVULANATE POTASSIUM 875; 125 MG/1; MG/1
1 TABLET, FILM COATED ORAL 2 TIMES DAILY
Qty: 14 TABLET | Refills: 0 | Status: SHIPPED | OUTPATIENT
Start: 2024-11-06 | End: 2024-11-13

## 2024-11-06 RX ORDER — METHYLPREDNISOLONE 4 MG/1
TABLET ORAL
Qty: 21 TABLET | Refills: 0 | Status: SHIPPED | OUTPATIENT
Start: 2024-11-06

## 2024-11-06 RX ORDER — BROMPHENIRAMINE MALEATE, PSEUDOEPHEDRINE HYDROCHLORIDE, AND DEXTROMETHORPHAN HYDROBROMIDE 2; 30; 10 MG/5ML; MG/5ML; MG/5ML
5 SYRUP ORAL EVERY 4 HOURS PRN
Qty: 120 ML | Refills: 0 | Status: SHIPPED | OUTPATIENT
Start: 2024-11-06

## 2024-11-06 NOTE — PROGRESS NOTES
"Subjective   Patient ID: Jessi Alvarado is a 24 y.o. female who presents for Earache (Bilateral ear discomfort, sinus drainage X 2 days ).  HPI  Patient presents for evaluation of sinus pressure. Patient reports 2 days of progressively worsening maxillary sinus pain, nasal congestion, and headache. There is reported mild postnasal drip and ear pressure. No fever, cough, or other constitutional signs and symptoms. Symptoms have been refractory to over-the-counter medications.  Patient does have environmental allergies.  No other complaints.    Review of Systems    Constitutional:  See HPI   ENT: See HPI  Respiratory:  No shortness of breath, No cough.      Neurologic:  Alert and oriented X4, No numbness, No tingling.    All other systems are negative     Objective     /79   Pulse (!) 113   Temp 36.7 °C (98.1 °F)   Resp 20   Ht 1.702 m (5' 7\")   Wt 53.1 kg (117 lb)   LMP 09/28/2024 (Approximate) Comment: pregnancy test sent to lab  SpO2 98%   BMI 18.32 kg/m²     Physical Exam    General:  Alert and oriented, No acute distress.    Eye:  Pupils are equal, round and reactive to light, Normal conjunctiva.    HENT:  Normocephalic, unremarkable oropharynx; bilateral tympanic membranes and canals are unremarkable; nontender cervical lymph nodes; nontender sinuses  Neck:  Supple    Respiratory: Respirations are non-labored   Musculoskeletal: Normal ROM and strength  Integumentary:  Warm, Dry, Intact, No pallor, No rash.    Neurologic:  Alert, Oriented, Normal sensory, Cranial Nerves II-XII are grossly intact  Psychiatric:  Cooperative, Appropriate mood & affect.    Assessment/Plan   Exam is relatively unremarkable.  History is consistent with ETD.  Prescriptions for short burst of Augmentin with Medrol Dosepak and Bromfed.  Patient's clinical presentation is otherwise unremarkable at this time. Patient is discharged with instructions to follow-up with primary care or seek emergency medical attention for " worsening symptoms or any new concerns.  Problem List Items Addressed This Visit    None  Visit Diagnoses       Dysfunction of both eustachian tubes    -  Primary    Relevant Medications    amoxicillin-pot clavulanate (Augmentin) 875-125 mg tablet    methylPREDNISolone (Medrol Dospak) 4 mg tablets    brompheniramine-pseudoeph-DM (Bromfed DM) 2-30-10 mg/5 mL syrup    Allergic rhinitis, unspecified seasonality, unspecified trigger        Relevant Medications    amoxicillin-pot clavulanate (Augmentin) 875-125 mg tablet    methylPREDNISolone (Medrol Dospak) 4 mg tablets    brompheniramine-pseudoeph-DM (Bromfed DM) 2-30-10 mg/5 mL syrup            Final diagnoses:   [H69.93] Dysfunction of both eustachian tubes   [J30.9] Allergic rhinitis, unspecified seasonality, unspecified trigger

## 2024-11-08 ENCOUNTER — TREATMENT (OUTPATIENT)
Dept: PHYSICAL THERAPY | Facility: CLINIC | Age: 24
End: 2024-11-08
Payer: COMMERCIAL

## 2024-11-08 DIAGNOSIS — Z98.890 STATUS POST MEDIAL MENISCUS REPAIR OF RIGHT KNEE: ICD-10-CM

## 2024-11-08 PROCEDURE — 97112 NEUROMUSCULAR REEDUCATION: CPT | Mod: GP,CQ

## 2024-11-08 PROCEDURE — 97110 THERAPEUTIC EXERCISES: CPT | Mod: GP,CQ

## 2024-11-08 ASSESSMENT — PAIN SCALES - GENERAL: PAINLEVEL_OUTOF10: 6

## 2024-11-08 ASSESSMENT — PAIN - FUNCTIONAL ASSESSMENT: PAIN_FUNCTIONAL_ASSESSMENT: 0-10

## 2024-11-08 NOTE — PROGRESS NOTES
Physical Therapy Treatment    Patient Name: Jessi Alvarado  MRN: [id number]  Today's Date: 11/8/2024  Time Calculation  Start Time: 1315  Stop Time: 1355  Time Calculation (min): 40 min  PT Therapeutic Procedures Time Entry  Neuromuscular Re-Education Time Entry: 10  Therapeutic Exercise Time Entry: 28       Assessment:   Patient lacking 6 degrees of extension this date, but is able to achieve full extension by end of session. Demo's good tolerance to [place] stimulation with quad contractions this date. Demo's improvements in ROM at end of session.     Plan:  Continue to work on improving strength and ROM to be able to return to normal work duties with little to no difficulty. -AB.    OP PT Plan  Treatment/Interventions: Blood flow restriction therapy, Cryotherapy, Dry needling, Education/ Instruction, Electrical stimulation, Gait training, Hot pack, Manual therapy, Neuromuscular re-education, Self care/ home management, Taping techniques, Therapeutic activities, Therapeutic exercises, Vasopneumatic device, Ultrasound  PT Plan: Skilled PT  PT Frequency: 2 times per week  Duration: 6 weeks  Onset Date: 10/14/24  Rehab Potential: Excellent  Plan of Care Agreement: Patient    Current Problem  Problem List Items Addressed This Visit             ICD-10-CM    Status post medial meniscus repair of right knee Z98.890       Subjective   General  Reason for Referral: postoperative pain of L knee  Referred By: Ed Maynard  General Comment: visit 3  Visit: 3  HEP: Yes  Patient states that her feeling is more discomfort than actual pain. Patient states that she is doing her HEP.     Precautions  Precautions  Precautions Comment: WBAT R LE, bilateral crutches, asthma, migraines    Pain  Pain Assessment: 0-10  0-10 (Numeric) Pain Score: 6  Pain Type: Surgical pain  Pain Location: Knee  Pain Orientation: Right    Objective   R knee ROM 6-118    Treatments:  Therapeutic Exercise: 28 minutes, 2 units  - SciFit, stepper, seat 18,  "Lv-1 x6' (P, time)  Slantboard 2x1' (N)  Step ups 6\" step 2x10 R leading Fwd/Lateral (N)  - supine quad sets 2x10   - heel slides 10\" x5, 10\" x5 with strap (P)   -ankle pumps 2x10   -prone lying for knee extension (X)    Neuromuscular Reeducation: 10 minutes, 1 units   Quad sets 10/10 10'     Gait: Not today: 11-8-24  Weight shifts in // bars: FWD, sideways, BWD x10 ea (N)  WBAT gait in // bars x 2 laps (N)  WBAT gait with crutches to front lobby - working on R heel strike and toe off, with knee extended during stance phase (N)  10/30/24 from evaluation: Spoke with surgeon after visit and confirmed patient had a \"resection not a repair of the meniscus on R knee The meniscus with NOT repairable.\" He states to follow typical post op knee arthroscopy protocol focusing on  ROM, strengthening and ambulation. States patient is WBAT at this time. - AB    OP EDUCATION:   Access Code: [email or web address]  URL: [email or web address]  Date: 10/30/2024  Prepared by: Bonny Ivory    Exercises  - Supine Quad Set  - 1 x daily - 7 x weekly - 3 sets - 10 reps  - Supine Ankle Pumps  - 1 x daily - 7 x weekly - 3 sets - 10 reps  - Seated Knee Flexion AAROM  - 1 x daily - 7 x weekly - 3 sets - 10 reps    Goals:  Active       R knee goals       Patient will report full understanding and compliance with HEP in 2 weeks in order to maximize therapeutic potential.         Start:  10/30/24    Expected End:  12/29/24            Patient will demonstrate at least a 10 point improvement in outcome measure to demonstrate improved overall quality of life and functional mobility.  6 weeks       Start:  10/30/24    Expected End:  12/29/24            STG: Patient will perform supine SLR on R LE without quad leg in 4 week.        Start:  10/30/24    Expected End:  12/29/24            Patient will demonstrate improved AROM of R knee extension motion to 0 deg (neutral) in order to improve gait mechanics. 4-6 weeks         Start:  10/30/24  "   Expected End:  12/29/24            Patient will demonstrate improved AROM of R knee flexion motion from 108 degrees to 140 degrees in order to achieve full knee ROM for functional mobility. 4-6 weeks         Start:  10/30/24    Expected End:  12/29/24            patient will demonstrate proper heel strike and normal gait mechanics when ambulating without assistive device. 6 weeks        Start:  10/30/24    Expected End:  12/29/24

## 2024-11-11 ENCOUNTER — TREATMENT (OUTPATIENT)
Dept: PHYSICAL THERAPY | Facility: CLINIC | Age: 24
End: 2024-11-11
Payer: COMMERCIAL

## 2024-11-11 ENCOUNTER — APPOINTMENT (OUTPATIENT)
Dept: PRIMARY CARE | Facility: CLINIC | Age: 24
End: 2024-11-11
Payer: COMMERCIAL

## 2024-11-11 VITALS
DIASTOLIC BLOOD PRESSURE: 61 MMHG | HEIGHT: 67 IN | SYSTOLIC BLOOD PRESSURE: 99 MMHG | BODY MASS INDEX: 17.89 KG/M2 | HEART RATE: 109 BPM | WEIGHT: 114 LBS

## 2024-11-11 DIAGNOSIS — Z98.890 STATUS POST MEDIAL MENISCUS REPAIR OF RIGHT KNEE: ICD-10-CM

## 2024-11-11 DIAGNOSIS — F90.2 ATTENTION DEFICIT HYPERACTIVITY DISORDER (ADHD), COMBINED TYPE: ICD-10-CM

## 2024-11-11 PROCEDURE — 97014 ELECTRIC STIMULATION THERAPY: CPT | Mod: GP,CQ

## 2024-11-11 PROCEDURE — 99213 OFFICE O/P EST LOW 20 MIN: CPT | Performed by: INTERNAL MEDICINE

## 2024-11-11 PROCEDURE — 3008F BODY MASS INDEX DOCD: CPT | Performed by: INTERNAL MEDICINE

## 2024-11-11 PROCEDURE — 97110 THERAPEUTIC EXERCISES: CPT | Mod: GP,CQ

## 2024-11-11 PROCEDURE — 1036F TOBACCO NON-USER: CPT | Performed by: INTERNAL MEDICINE

## 2024-11-11 RX ORDER — DEXTROAMPHETAMINE SACCHARATE, AMPHETAMINE ASPARTATE MONOHYDRATE, DEXTROAMPHETAMINE SULFATE AND AMPHETAMINE SULFATE 5; 5; 5; 5 MG/1; MG/1; MG/1; MG/1
20 CAPSULE, EXTENDED RELEASE ORAL EVERY MORNING
Qty: 30 CAPSULE | Refills: 0 | Status: SHIPPED | OUTPATIENT
Start: 2024-11-11 | End: 2024-12-11

## 2024-11-11 ASSESSMENT — ENCOUNTER SYMPTOMS
CONSTITUTIONAL NEGATIVE: 1
MUSCULOSKELETAL NEGATIVE: 1
PSYCHIATRIC NEGATIVE: 1
AGITATION: 0
DYSURIA: 0
LIGHT-HEADEDNESS: 0
ALLERGIC/IMMUNOLOGIC NEGATIVE: 1
CONSTIPATION: 0
NAUSEA: 0
GASTROINTESTINAL NEGATIVE: 1
ABDOMINAL PAIN: 0
ADENOPATHY: 0
BACK PAIN: 0
DIARRHEA: 0
HEMATOLOGIC/LYMPHATIC NEGATIVE: 1
RESPIRATORY NEGATIVE: 1
WHEEZING: 0
JOINT SWELLING: 0
CONFUSION: 0
SHORTNESS OF BREATH: 0
ENDOCRINE NEGATIVE: 1
FEVER: 0
NECK STIFFNESS: 0
HEADACHES: 0
CARDIOVASCULAR NEGATIVE: 1
NEUROLOGICAL NEGATIVE: 1
EYE DISCHARGE: 0
NUMBNESS: 0
CHILLS: 0
PALPITATIONS: 0
EYES NEGATIVE: 1
COUGH: 0
VOMITING: 0
ABDOMINAL DISTENTION: 0

## 2024-11-11 ASSESSMENT — PAIN SCALES - GENERAL: PAINLEVEL_OUTOF10: 6

## 2024-11-11 ASSESSMENT — PATIENT HEALTH QUESTIONNAIRE - PHQ9
2. FEELING DOWN, DEPRESSED OR HOPELESS: NOT AT ALL
SUM OF ALL RESPONSES TO PHQ9 QUESTIONS 1 AND 2: 0
1. LITTLE INTEREST OR PLEASURE IN DOING THINGS: NOT AT ALL

## 2024-11-11 ASSESSMENT — PAIN - FUNCTIONAL ASSESSMENT: PAIN_FUNCTIONAL_ASSESSMENT: 0-10

## 2024-11-11 NOTE — PROGRESS NOTES
"Physical Therapy Treatment    Patient Name: Jessi Alvarado  MRN: [id number]  Today's Date: 11/11/2024  Time Calculation  Start Time: 1450  Stop Time: 1531  Time Calculation (min): 41 min  PT Therapeutic Procedures Time Entry  Therapeutic Exercise Time Entry: 28  PT Modalities Time Entry  E-Stim (Unattended) Time Entry: 10      Current Problem  Problem List Items Addressed This Visit             ICD-10-CM    Status post medial meniscus repair of right knee Z98.890       Subjective   Pt.'s name and birthday confirmed.  Pt. Is compliant with HEP.  Pt. Reports soreness with thigh pain since previous visit.  No reports of falls.  Pt.'s follow up with Dr. Maynard tomorrow.      Reason for Referral: postoperative pain of L knee  Referred By: Ed Maynard  General Comment: visit 4      Precautions  Precautions  Precautions Comment: WBAT R LE, bilateral crutches, asthma, migraines      Pain  Pain Assessment: 0-10  0-10 (Numeric) Pain Score: 6  Pain Location: Knee  Pain Orientation: Right    Objective:  R knee ROM 6-118     Treatments:   Therapeutic Exercise: 28 minutes, 2 units  - SciFit, stepper, seat 18, Lv-1 x6' (X)  -Bike x6 mins (N)  Slantboard 2x1'   Step ups 6\" step 2x10 R leading Fwd/Lateral   - supine quad sets 2x10   - heel slides 10\" x5, 10\" x5 with strap   -ankle pumps 2x10   -prone lying for knee extension (X)     Neuromuscular Reeducation: 10 minutes, 1 units   Quad sets 10/10 10' +     Gait: Not today: 11-8-24  Weight shifts in // bars: FWD, sideways, BWD x10 ea (N)  WBAT gait in // bars x 2 laps (N)  WBAT gait with crutches to front lobby - working on R heel strike and toe off, with knee extended during stance phase       10/30/24 from evaluation: Spoke with surgeon after visit and confirmed patient had a \"resection not a repair of the meniscus on R knee The meniscus with NOT repairable.\" He states to follow typical post op knee arthroscopy protocol focusing on  ROM, strengthening and ambulation. States patient " is WBAT at this time. - AB     OP EDUCATION:    Access Code: [email or web address]  URL: [email or web address]  Date: 10/30/2024  Prepared by: Bonny Ivory     Exercises  - Supine Quad Set  - 1 x daily - 7 x weekly - 3 sets - 10 reps  - Supine Ankle Pumps  - 1 x daily - 7 x weekly - 3 sets - 10 reps  - Seated Knee Flexion AAROM  - 1 x daily - 7 x weekly - 3 sets - 10 reps             Assessment:  Pt. With fair tolerance noted with TE.  Cues needed with form.  Belgian stim applied which patient states was helpful last session.  Trial gait with one crutch this date, cues with heel strike/toe off.         Plan:  Continue with strengthening, ROM and flexibility per tolerance to improve daily activities and ambulation with little to no difficulty.  MB       OP PT Plan  Treatment/Interventions: Blood flow restriction therapy, Cryotherapy, Dry needling, Education/ Instruction, Electrical stimulation, Gait training, Hot pack, Manual therapy, Neuromuscular re-education, Self care/ home management, Taping techniques, Therapeutic activities, Therapeutic exercises, Vasopneumatic device, Ultrasound  PT Plan: Skilled PT  PT Frequency: 2 times per week  Duration: 6 weeks  Onset Date: 10/14/24  Rehab Potential: Excellent  Plan of Care Agreement: Patient              Goals:  Active       R knee goals       Patient will report full understanding and compliance with HEP in 2 weeks in order to maximize therapeutic potential.         Start:  10/30/24    Expected End:  12/29/24            Patient will demonstrate at least a 10 point improvement in outcome measure to demonstrate improved overall quality of life and functional mobility.  6 weeks       Start:  10/30/24    Expected End:  12/29/24            STG: Patient will perform supine SLR on R LE without quad leg in 4 week.        Start:  10/30/24    Expected End:  12/29/24            Patient will demonstrate improved AROM of R knee extension motion to 0 deg (neutral) in  order to improve gait mechanics. 4-6 weeks         Start:  10/30/24    Expected End:  12/29/24            Patient will demonstrate improved AROM of R knee flexion motion from 108 degrees to 140 degrees in order to achieve full knee ROM for functional mobility. 4-6 weeks         Start:  10/30/24    Expected End:  12/29/24            patient will demonstrate proper heel strike and normal gait mechanics when ambulating without assistive device. 6 weeks        Start:  10/30/24    Expected End:  12/29/24

## 2024-11-11 NOTE — PROGRESS NOTES
Subjective   Patient ID: Jessi Alvarado is a 24 y.o. female who presents for Follow-up (DISCUSS MEDS).  Here for med refill  Adderall very effective without side effects      Review of Systems   Constitutional: Negative.  Negative for chills and fever.   HENT: Negative.  Negative for congestion.    Eyes: Negative.  Negative for discharge.   Respiratory: Negative.  Negative for cough, shortness of breath and wheezing.    Cardiovascular: Negative.  Negative for chest pain, palpitations and leg swelling.   Gastrointestinal: Negative.  Negative for abdominal distention, abdominal pain, constipation, diarrhea, nausea and vomiting.   Endocrine: Negative.    Genitourinary: Negative.  Negative for dysuria and urgency.   Musculoskeletal: Negative.  Negative for back pain, joint swelling and neck stiffness.   Skin: Negative.  Negative for rash.   Allergic/Immunologic: Negative.  Negative for immunocompromised state.   Neurological: Negative.  Negative for light-headedness, numbness and headaches.   Hematological: Negative.  Negative for adenopathy.   Psychiatric/Behavioral: Negative.  Negative for agitation, behavioral problems and confusion.    All other systems reviewed and are negative.      Objective   Physical Exam  Vitals reviewed.   Constitutional:       General: She is not in acute distress.     Appearance: Normal appearance.   HENT:      Head: Normocephalic and atraumatic.      Nose: Nose normal.   Eyes:      Conjunctiva/sclera: Conjunctivae normal.      Pupils: Pupils are equal, round, and reactive to light.   Neck:      Vascular: No carotid bruit.   Cardiovascular:      Rate and Rhythm: Normal rate and regular rhythm.      Pulses: Normal pulses.      Heart sounds:      No gallop.   Pulmonary:      Effort: Pulmonary effort is normal. No respiratory distress.      Breath sounds: Normal breath sounds. No wheezing.   Abdominal:      General: Bowel sounds are normal.      Palpations: Abdomen is soft.       "Tenderness: There is no abdominal tenderness.   Musculoskeletal:         General: Normal range of motion.      Cervical back: Normal range of motion. No rigidity.   Lymphadenopathy:      Cervical: No cervical adenopathy.   Skin:     General: Skin is warm.      Findings: No rash.   Neurological:      General: No focal deficit present.      Mental Status: She is alert and oriented to person, place, and time.   Psychiatric:         Mood and Affect: Mood normal.         Behavior: Behavior normal.     BP 99/61   Pulse 109   Ht 1.702 m (5' 7\")   Wt 51.7 kg (114 lb)   LMP 09/28/2024 (Approximate) Comment: pregnancy test sent to lab  BMI 17.85 kg/m²    Hemoglobin A1C   Date/Time Value Ref Range Status   09/08/2023 09:32 AM 5.4 % Final     Comment:          Diagnosis of Diabetes-Adults   Non-Diabetic: < or = 5.6%   Increased risk for developing diabetes: 5.7-6.4%   Diagnostic of diabetes: > or = 6.5%  .       Monitoring of Diabetes                Age (y)     Therapeutic Goal (%)   Adults:          >18           <7.0   Pediatrics:    13-18           <7.5                   7-12           <8.0                   0- 6            7.5-8.5   American Diabetes Association. Diabetes Care 33(S1), Jan 2010.     Assessment/Plan   Problem List Items Addressed This Visit       ADHD (attention deficit hyperactivity disorder)    Relevant Medications    amphetamine-dextroamphetamine XR (Adderall XR) 20 mg 24 hr capsule        OAR[facility] HAS BEEN REVIEWED AND IS CONSISTENT WITH PRESCRIBED MEDICATIONS, CONSIDERED THE RISK OF ABUSE, DEPENDENCE, ADDICTION AND DIVERSION, MEDICATION IS FELT TO BE CLINICALLY APPROPRIATE ON THE DOCUMENTED DIAGNOSIS    UTOX AND CONTRACT UP TO DATE    Fu 1 mo  "

## 2024-11-12 ENCOUNTER — HOSPITAL ENCOUNTER (OUTPATIENT)
Dept: RADIOLOGY | Facility: EXTERNAL LOCATION | Age: 24
Discharge: HOME | End: 2024-11-12

## 2024-11-12 ENCOUNTER — APPOINTMENT (OUTPATIENT)
Dept: ORTHOPEDIC SURGERY | Facility: CLINIC | Age: 24
End: 2024-11-12
Payer: COMMERCIAL

## 2024-11-12 DIAGNOSIS — M25.562 POSTOPERATIVE PAIN OF LEFT KNEE: ICD-10-CM

## 2024-11-12 DIAGNOSIS — G89.18 POSTOPERATIVE PAIN OF LEFT KNEE: ICD-10-CM

## 2024-11-12 PROCEDURE — 1036F TOBACCO NON-USER: CPT | Performed by: SPECIALIST

## 2024-11-12 PROCEDURE — 99024 POSTOP FOLLOW-UP VISIT: CPT | Performed by: SPECIALIST

## 2024-11-12 PROCEDURE — 76882 US LMTD JT/FCL EVL NVASC XTR: CPT | Performed by: SPECIALIST

## 2024-11-12 ASSESSMENT — PAIN - FUNCTIONAL ASSESSMENT: PAIN_FUNCTIONAL_ASSESSMENT: 0-10

## 2024-11-12 ASSESSMENT — PAIN SCALES - GENERAL: PAINLEVEL_OUTOF10: 6

## 2024-11-12 ASSESSMENT — PAIN DESCRIPTION - DESCRIPTORS: DESCRIPTORS: ACHING;SHARP;BURNING

## 2024-11-12 NOTE — ASSESSMENT & PLAN NOTE
Assessment: Right knee 4 weeks and 1 day status post 10/14/2024 subtotal lateral meniscectomy.  She comes in today stating that she is making progress.  Her pain level is coming down.  She is no longer needing narcotic pain medication.  She does get some popping and clicking in physical therapy.  Sometimes this is painful but sometimes it is not.    Plan:  Continue the physical therapy and home exercise program.  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  She should not be required to lift more than 20 pounds.  She should be allowed to do her mainly sitting work.  She can continue the aspirin on a daily basis.  Follow-up in 1 month for reevaluation.  Advance her activities of walking toward unaided walking presently she uses 1 or 2  crutches

## 2024-11-12 NOTE — PROGRESS NOTES
"Assessment/Plan   Encounter Diagnoses:  Postoperative pain of left knee  Status post medial meniscus repair of right knee  Assessment: Right knee 4 weeks and 1 day status post 10/14/2024 subtotal lateral meniscectomy.  She comes in today stating that she is making progress.  Her pain level is coming down.  She is no longer needing narcotic pain medication.  She does get some popping and clicking in physical therapy.  Sometimes this is painful but sometimes it is not.    Plan:  Continue the physical therapy and home exercise program.  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  She should not be required to lift more than 20 pounds.  She should be allowed to do her mainly sitting work.  She can continue the aspirin on a daily basis.  Follow-up in 1 month for reevaluation.  Advance her activities of walking toward unaided walking presently she uses 1 or 2  crutches       Subjective    Patient ID: Jessi Alvarado is a 24 y.o. female.    Chief Complaint: Post-op and Pain of the Right Knee (Sx,- RIGHT KNEE MENISCAL REPAIR 10/14/2024/Patient states she has been \"hearing a popping noise in her right knee when she bends her knee.\")     Last Surgery: Repair Arthroscopy Knee Meniscus - Right  Last Surgery Date: 10/14/2024    HPI  24-year-old who is 4 months and 1 day status post 10/14/2024 knee arthroscopy.  She is making good interval improvements.  She does have some popping and clicking in physical therapy.  Sometimes this is painful.  She is ambulating with 1 or 2 crutches.    OBJECTIVE: ORTHO EXAM    Right knee:  Skin healthy and intact  No gross swelling or ecchymosis  Alignment: Neutral  Effusion: Scant  ROM: 0 degrees Extension   115 degrees Flexion  Minimal crepitance with range of motion  No pain with internal rotation of the hip  Tenderness to palpation: Medial joint line and lateral joint line     No laxity to valgus stress  No laxity to varus stress  Negative Lachman´s " test  Negative posterior drawer test  Minimal pain with Agueda´s test     Neurovascular exam normal distally  2+ DP pulse and good cap refill    IMAGE RESULTS:  Point of Care Ultrasound  These images are not reportable by radiology and will not be interpreted   by  Radiologists.      ULTRASOUND  DIAGNOSTIC ULTRASOUND REPORT FINAL: Right KNEE  Sonographer: Ed Maynard MD  Indication: Knee Pain  Procedure: Ultrasound, extremity, nonvascular, real-time, COMPLETE, anatomic specific  Technique: B-Mode Ultrasound Examination performed using 6- 9 MHz linear transducer with [employer]  STUDY TYPE:   1. ULTRASOUND EXTREMITY  2. REAL TIME WITH IMAGE DOCUMENTATION  3. NON-VASCULAR  4. COMPLETE STUDY, INCLUDING BUT NOT LIMITED TO MUSCLE, TENDONS, LIGAMENTS, SOFT TISSUES, ADIPOSE TISSUE AND SUBCUTANEOUS TISSUE.  Site: KNEE   Live ultrasound was performed with of patient's  KNEE and PERMANENTLY documented. I personally performed the ultrasound and reviewed the findings. These show:    Marlee-articular evaluation:   An intact Quadriceps Tendon with the Quadriceps Muscle fibers showing normal striations Quadriceps Tendon demonstrating normal fibrillar pattern. . The Patellar Tendon demonstrates normal fibrillar pattern and is intact.   No significant soft tissue fluid collection/abscess appreciated.     Joint Evaluation:  The lateral joint line shows an intact LCL.   Medial joint line exam shows an intact MCL.   The patellar tendon was within normal limits.  Scant to no joint effusion noted.     The patient tolerated the procedure well.        Procedures     Orders Placed This Encounter    Point of Care Ultrasound

## 2024-11-14 ENCOUNTER — APPOINTMENT (OUTPATIENT)
Dept: ORTHOPEDIC SURGERY | Facility: CLINIC | Age: 24
End: 2024-11-14
Payer: COMMERCIAL

## 2024-11-15 ENCOUNTER — TREATMENT (OUTPATIENT)
Dept: PHYSICAL THERAPY | Facility: CLINIC | Age: 24
End: 2024-11-15
Payer: COMMERCIAL

## 2024-11-15 DIAGNOSIS — Z98.890 STATUS POST MEDIAL MENISCUS REPAIR OF RIGHT KNEE: ICD-10-CM

## 2024-11-15 PROCEDURE — 97110 THERAPEUTIC EXERCISES: CPT | Mod: GP,CQ

## 2024-11-15 PROCEDURE — 97112 NEUROMUSCULAR REEDUCATION: CPT | Mod: GP,CQ

## 2024-11-15 ASSESSMENT — PAIN SCALES - GENERAL: PAINLEVEL_OUTOF10: 3

## 2024-11-15 ASSESSMENT — PAIN - FUNCTIONAL ASSESSMENT: PAIN_FUNCTIONAL_ASSESSMENT: 0-10

## 2024-11-15 NOTE — PROGRESS NOTES
Physical Therapy Treatment    Patient Name: Jessi Alvarado  MRN: [id number]  : 2000  Ed Maynard  Today's Date: 11/15/2024  Time Calculation  Start Time: 1315  Stop Time: 1405  Time Calculation (min): 50 min  PT Therapeutic Procedures Time Entry  Neuromuscular Re-Education Time Entry: 10  Therapeutic Exercise Time Entry: 36         Assessment:   Patient identified by name & .  Treatment consisted of ex's and NMR with Russian stim. Patient with good demo of quad sets during NMR. Patient used 2 crutches coming into PT treatment area and used 1 crutch on L during treatment. Wore brace during standing ex's. Pain level 3/10 pre and post treatment. Good demo of correct gait pattern with 1 crutch.   Plan:  Continue to progress with ex's per poc and protocol and continue with NMR of russian stim for improving quad strength and eventual gait without assistive device. -MA   OP PT Plan  Treatment/Interventions: Blood flow restriction therapy, Cryotherapy, Dry needling, Education/ Instruction, Electrical stimulation, Gait training, Hot pack, Manual therapy, Neuromuscular re-education, Self care/ home management, Taping techniques, Therapeutic activities, Therapeutic exercises, Vasopneumatic device, Ultrasound  PT Plan: Skilled PT  PT Frequency: 2 times per week  Duration: 6 weeks  Onset Date: 10/14/24  Rehab Potential: Excellent  Plan of Care Agreement: Patient    Current Problem  Problem List Items Addressed This Visit             ICD-10-CM    Status post medial meniscus repair of right knee Z98.890       Subjective   Patient compliant with HEP. Went back to work on Tuesday. She is on light duty. She is on a 20# lifting restriction at work along with several other restrictions. States she is wearing an old brace that she had from a past surgery to work. She uses the crutches at work, but not much at home. States her right knee is doing a lot better, reporting increased mobility and decreased pain. States her  "heels are sore due to wearing her steel toed boots at work.     General  Reason for Referral: postoperative pain of L knee  Referred By: Ed Maynard  General Comment: visit 5  Visit #5  Precautions  Precautions  Precautions Comment: WBAT R LE, bilateral crutches, asthma, migraines    Pain  Pain Assessment: 0-10  0-10 (Numeric) Pain Score: 3  Pain Location: Knee  Pain Orientation: Right    Objective:     Treatments:   Therapeutic Exercise: 36minutes, 2 units  - SciFit, stepper, seat 18, Lv-1 x6' (X)  - Recumbent Bike, seat 3.5 x6 mins   - Slantboard 2x1'   - Step ups 6\" step 2x10 R leading Fwd/Lateral   - Standing hip flexion 2x10  ea LE (N)  - Standing hip abduction 2x10 ea LE (N)  - supine quad sets 2x10   - heel slides 10\" x5, 10\" x5 with strap   -ankle pumps 2x10   -prone lying for knee extension (X)     Neuromuscular Reeducation: 10 minutes, 1 units   [place]an E-Stim: Quad sets 10/10 10' in long sitting     Gait: (X)  Weight shifts in // bars: FWD, sideways, BWD x10 ea (N)  WBAT gait in // bars x 2 laps (N)  WBAT gait with crutches to front lobby - working on R heel strike and toe off, with knee extended during stance phase       10/30/24 from evaluation: Spoke with surgeon after visit and confirmed patient had a \"resection not a repair of the meniscus on R knee The meniscus with NOT repairable.\" He states to follow typical post op knee arthroscopy protocol focusing on  ROM, strengthening and ambulation. States patient is WBAT at this time. - AB     OP EDUCATION:    Access Code: [email or web address]  URL: [email or web address]  Date: 10/30/2024  Prepared by: Bonny Ivory     Exercises  - Supine Quad Set  - 1 x daily - 7 x weekly - 3 sets - 10 reps  - Supine Ankle Pumps  - 1 x daily - 7 x weekly - 3 sets - 10 reps  - Seated Knee Flexion AAROM  - 1 x daily - 7 x weekly - 3 sets - 10 reps  Goals:  Active       R knee goals       Patient will report full understanding and compliance with HEP in 2 weeks " in order to maximize therapeutic potential.         Start:  10/30/24    Expected End:  12/29/24            Patient will demonstrate at least a 10 point improvement in outcome measure to demonstrate improved overall quality of life and functional mobility.  6 weeks       Start:  10/30/24    Expected End:  12/29/24            STG: Patient will perform supine SLR on R LE without quad leg in 4 week.        Start:  10/30/24    Expected End:  12/29/24            Patient will demonstrate improved AROM of R knee extension motion to 0 deg (neutral) in order to improve gait mechanics. 4-6 weeks         Start:  10/30/24    Expected End:  12/29/24            Patient will demonstrate improved AROM of R knee flexion motion from 108 degrees to 140 degrees in order to achieve full knee ROM for functional mobility. 4-6 weeks         Start:  10/30/24    Expected End:  12/29/24            patient will demonstrate proper heel strike and normal gait mechanics when ambulating without assistive device. 6 weeks        Start:  10/30/24    Expected End:  12/29/24

## 2024-11-19 ENCOUNTER — OFFICE VISIT (OUTPATIENT)
Dept: URGENT CARE | Facility: CLINIC | Age: 24
End: 2024-11-19
Payer: COMMERCIAL

## 2024-11-19 VITALS
HEIGHT: 67 IN | DIASTOLIC BLOOD PRESSURE: 65 MMHG | TEMPERATURE: 98.4 F | RESPIRATION RATE: 16 BRPM | OXYGEN SATURATION: 97 % | BODY MASS INDEX: 18.83 KG/M2 | WEIGHT: 120 LBS | HEART RATE: 98 BPM | SYSTOLIC BLOOD PRESSURE: 94 MMHG

## 2024-11-19 DIAGNOSIS — J04.0 LARYNGITIS: Primary | ICD-10-CM

## 2024-11-19 DIAGNOSIS — J06.9 UPPER RESPIRATORY TRACT INFECTION, UNSPECIFIED TYPE: ICD-10-CM

## 2024-11-19 PROCEDURE — 99214 OFFICE O/P EST MOD 30 MIN: CPT | Performed by: PHYSICIAN ASSISTANT

## 2024-11-19 RX ORDER — BROMPHENIRAMINE MALEATE, PSEUDOEPHEDRINE HYDROCHLORIDE, AND DEXTROMETHORPHAN HYDROBROMIDE 2; 30; 10 MG/5ML; MG/5ML; MG/5ML
5 SYRUP ORAL EVERY 4 HOURS PRN
Qty: 120 ML | Refills: 0 | Status: SHIPPED | OUTPATIENT
Start: 2024-11-19

## 2024-11-19 RX ORDER — METHYLPREDNISOLONE 4 MG/1
TABLET ORAL
Qty: 21 TABLET | Refills: 0 | Status: SHIPPED | OUTPATIENT
Start: 2024-11-19

## 2024-11-19 RX ORDER — AZITHROMYCIN 250 MG/1
TABLET, FILM COATED ORAL
Qty: 6 TABLET | Refills: 0 | Status: SHIPPED | OUTPATIENT
Start: 2024-11-19

## 2024-11-19 NOTE — PROGRESS NOTES
"Subjective   Patient ID: Jessi Alvarado is a 24 y.o. female who presents for URI (Right ear pain, sore throat, cough, congestion, headache, fatigued hurts to breath x 5 days).  HPI  Presents for evaluation of URI.  Symptoms including cough, congestion, body aches, malaise, and headache have been present for several days and refractory to OTC meds.  No fever, chills, nausea, vomiting, abdominal pain, CP, or SOB.  No exacerbating factors    Review of Systems    Constitutional:  See HPI   ENT: See HPI  Respiratory: See HPI  Neurologic:  Alert and oriented X4, No numbness, No tingling.    All other systems are negative     Objective     BP 94/65   Pulse 98   Temp 36.9 °C (98.4 °F)   Resp 16   Ht 1.702 m (5' 7\")   Wt 54.4 kg (120 lb)   SpO2 97%   BMI 18.79 kg/m²     Physical Exam    General:  Alert and oriented, No acute distress.    Eye:  Pupils are equal, round and reactive to light, Normal conjunctiva.    HENT:  Normocephalic, moderate oropharyngeal erythema without edema or exudate; uvula midline: Generalized cervical lymph node tenderness without enlargement; loss of voice noted  Neck:  Supple    Respiratory: Respirations are non-labored; LCTA bilaterally  Musculoskeletal: Normal ROM and strength  Integumentary:  Warm, Dry, Intact, No pallor, No rash.    Neurologic:  Alert, Oriented, Normal sensory, Cranial Nerves II-XII are grossly intact  Psychiatric:  Cooperative, Appropriate mood & affect.    Assessment/Plan   Exam is consistent with upper respiratory infection and laryngitis.  Prescription for Z-Robert, Medrol, and Bromfed.  Patient's clinical presentation is otherwise unremarkable at this time. Patient is discharged with instructions to follow-up with primary care or seek emergency medical attention for worsening symptoms or any new concerns.  Problem List Items Addressed This Visit    None  Visit Diagnoses       Laryngitis    -  Primary    Relevant Medications    azithromycin (Zithromax) 250 mg " tablet    brompheniramine-pseudoeph-DM (Bromfed DM) 2-30-10 mg/5 mL syrup    methylPREDNISolone (Medrol Dospak) 4 mg tablets    Upper respiratory tract infection, unspecified type        Relevant Medications    azithromycin (Zithromax) 250 mg tablet    brompheniramine-pseudoeph-DM (Bromfed DM) 2-30-10 mg/5 mL syrup    methylPREDNISolone (Medrol Dospak) 4 mg tablets            Final diagnoses:   [J04.0] Laryngitis   [J06.9] Upper respiratory tract infection, unspecified type

## 2024-11-20 ENCOUNTER — TREATMENT (OUTPATIENT)
Dept: PHYSICAL THERAPY | Facility: CLINIC | Age: 24
End: 2024-11-20
Payer: COMMERCIAL

## 2024-11-20 DIAGNOSIS — Z98.890 STATUS POST MEDIAL MENISCUS REPAIR OF RIGHT KNEE: ICD-10-CM

## 2024-11-20 PROCEDURE — 97110 THERAPEUTIC EXERCISES: CPT | Mod: GP,CQ

## 2024-11-20 ASSESSMENT — PAIN SCALES - GENERAL: PAINLEVEL_OUTOF10: 2

## 2024-11-20 ASSESSMENT — PAIN - FUNCTIONAL ASSESSMENT: PAIN_FUNCTIONAL_ASSESSMENT: 0-10

## 2024-11-20 NOTE — PROGRESS NOTES
Physical Therapy Treatment    Patient Name: Jessi Alvarado  MRN: [id number]  Today's Date: 11/20/2024  Time Calculation  Start Time: 1000  Stop Time: 1043  Time Calculation (min): 43 min  PT Therapeutic Procedures Time Entry  Therapeutic Exercise Time Entry: 40       Assessment:   Patient able to tolerate new PRE's with mild challenges with patient ruthg fair tolerance. Patient challenged with SLR and SLS, but is able to complete reps. Demo's good quad contraction. Demo's good gait mechanics without use of axillary crutch. Demo's no change in symptoms pre and post session.     Plan:  Continue to work on improving strength and decreasing pain to be able to perform normal work duties with little no difficulty. -AB.     OP PT Plan  Treatment/Interventions: Blood flow restriction therapy, Cryotherapy, Dry needling, Education/ Instruction, Electrical stimulation, Gait training, Hot pack, Manual therapy, Neuromuscular re-education, Self care/ home management, Taping techniques, Therapeutic activities, Therapeutic exercises, Vasopneumatic device, Ultrasound  PT Plan: Skilled PT  PT Frequency: 2 times per week  Duration: 6 weeks  Onset Date: 10/14/24  Rehab Potential: Excellent  Plan of Care Agreement: Patient    Current Problem  Problem List Items Addressed This Visit             ICD-10-CM    Status post medial meniscus repair of right knee Z98.890       Subjective   General  Reason for Referral: postoperative pain of L knee  Referred By: Ed Maynard  General Comment: visit 6  Visit: 6  HEP: Yes  Patient states that she's back at work, but states that she's just sitting down doing work. States that she does sometimes have pain, but states that it's not so bad. Reports that it's just soreness now. States that she went back to work last week.     Precautions  Precautions  Precautions Comment: WBAT R LE, bilateral crutches, asthma, migraines    Pain  Pain Assessment: 0-10  0-10 (Numeric) Pain Score: 2  Pain Location:  "Knee  Pain Orientation: Right    Objective     Treatments:  Therapeutic Exercise: 40 minutes, 3 units  SciFit, stepper, seat 18, Lv-1 x6' (X)  Recumbent Bike, seat 3.5 x6 mins   Slantboard 2x1'   Step ups 6\" step 2x10 R leading Fwd/Lateral   Standing heel raises off step 2x10 Bilat (N)  Standing hip flexion 2x10  ea LE   Standing hip abduction 2x10 ea LE   Standing hip extension 2x10 Bilat (N)  Seated hip adduction 2x10 5\" hold (N)  Seated hip abduction [place] Tband 2x10 (N)  SLR 2x10 R (N)  SLS 3x30\" R (N)    Not today: 11-20-24  supine quad sets 2x10   heel slides 10\" x5, 10\" x5 with strap   ankle pumps 2x10   prone lying for knee extension (X)     Neuromuscular Reeducation: Not today 11-20-24  [place] E-Stim: Quad sets 10/10 10' in long sitting     Gait: (X)  Weight shifts in // bars: FWD, sideways, BWD x10 ea (N)  WBAT gait in // bars x 2 laps (N)  WBAT gait with crutches to front lobby - working on R heel strike and toe off, with knee extended during stance phase     OP EDUCATION:     Access Code: [email or web address]  URL: [email or web address]  Date: 10/30/2024  Prepared by: Bonny Ivory     Exercises  - Supine Quad Set  - 1 x daily - 7 x weekly - 3 sets - 10 reps  - Supine Ankle Pumps  - 1 x daily - 7 x weekly - 3 sets - 10 reps  - Seated Knee Flexion AAROM  - 1 x daily - 7 x weekly - 3 sets - 10 reps    Goals:  Active       R knee goals       Patient will report full understanding and compliance with HEP in 2 weeks in order to maximize therapeutic potential.         Start:  10/30/24    Expected End:  12/29/24            Patient will demonstrate at least a 10 point improvement in outcome measure to demonstrate improved overall quality of life and functional mobility.  6 weeks       Start:  10/30/24    Expected End:  12/29/24            STG: Patient will perform supine SLR on R LE without quad leg in 4 week.        Start:  10/30/24    Expected End:  12/29/24            Patient will " demonstrate improved AROM of R knee extension motion to 0 deg (neutral) in order to improve gait mechanics. 4-6 weeks         Start:  10/30/24    Expected End:  12/29/24            Patient will demonstrate improved AROM of R knee flexion motion from 108 degrees to 140 degrees in order to achieve full knee ROM for functional mobility. 4-6 weeks         Start:  10/30/24    Expected End:  12/29/24            patient will demonstrate proper heel strike and normal gait mechanics when ambulating without assistive device. 6 weeks        Start:  10/30/24    Expected End:  12/29/24

## 2024-11-22 ENCOUNTER — APPOINTMENT (OUTPATIENT)
Dept: PHYSICAL THERAPY | Facility: CLINIC | Age: 24
End: 2024-11-22
Payer: COMMERCIAL

## 2024-11-26 ENCOUNTER — TREATMENT (OUTPATIENT)
Dept: PHYSICAL THERAPY | Facility: CLINIC | Age: 24
End: 2024-11-26
Payer: COMMERCIAL

## 2024-11-26 DIAGNOSIS — Z98.890 STATUS POST MEDIAL MENISCUS REPAIR OF RIGHT KNEE: ICD-10-CM

## 2024-11-26 PROCEDURE — 97110 THERAPEUTIC EXERCISES: CPT | Mod: GP,CQ

## 2024-11-26 PROCEDURE — 97140 MANUAL THERAPY 1/> REGIONS: CPT | Mod: GP,CQ

## 2024-11-26 ASSESSMENT — PAIN - FUNCTIONAL ASSESSMENT: PAIN_FUNCTIONAL_ASSESSMENT: 0-10

## 2024-11-26 ASSESSMENT — PAIN SCALES - GENERAL: PAINLEVEL_OUTOF10: 6

## 2024-11-26 NOTE — PROGRESS NOTES
Physical Therapy Treatment    Patient Name: Jessi Alvarado  MRN: [id number]  Today's Date: 11/26/2024  Time Calculation  Start Time: 0745  Stop Time: 0835  Time Calculation (min): 50 min  PT Therapeutic Procedures Time Entry  Manual Therapy Time Entry: 30  Therapeutic Exercise Time Entry: 15       Assessment:   Focus on manual therapy this date d/t increased symptoms noted at beginning of session. Patient responds well to IASTM/STM with patient josh restriction in R IT band. Patient able to incorporate IT band stretching, and HS stretching at end of session. HEP updated and reviewed with patient josh good understanding.     Plan:  Continue to work on improving strength and decreasing pain to be able to tolerate prolonged ambulation with proper gait mechanics. -AB.     OP PT Plan  Treatment/Interventions: Blood flow restriction therapy, Cryotherapy, Dry needling, Education/ Instruction, Electrical stimulation, Gait training, Hot pack, Manual therapy, Neuromuscular re-education, Self care/ home management, Taping techniques, Therapeutic activities, Therapeutic exercises, Vasopneumatic device, Ultrasound  PT Plan: Skilled PT  PT Frequency: 2 times per week  Duration: 6 weeks  Onset Date: 10/14/24  Rehab Potential: Excellent  Plan of Care Agreement: Patient    Current Problem  Problem List Items Addressed This Visit             ICD-10-CM    Status post medial meniscus repair of right knee Z98.890       Subjective   General  Reason for Referral: postoperative pain of L knee  Referred By: Ed Maynard  General Comment: visit 7  Visit: 7  HEP: Yes  Patient states that her knee feels unstable. States that when her knee is extending it feels uncomfortable underneath her knee cap. States that when she's bending at an angle (hinging) it pops. States that steps have been hard, states that sometimes she scoots down the stairs d/t the pain.     Precautions  Precautions  Precautions Comment: WBAT R LE, bilateral crutches,  "asthma, migraines    Pain  Pain Assessment: 0-10  0-10 (Numeric) Pain Score: 6  Pain Location: Knee  Pain Orientation: Right    Objective     Treatments:  Therapeutic Exercise: 15 minutes, 1 units  SciFit, stepper, seat 18, Lv-1 x6' (X)  Recumbent Bike, seat 3.5 x6 mins   Slantboard 2x1'   Supine IT band stretch 3x30\" R (N)  Supine HS stretch 3x30\" R (N)    Not today: 11-26-24  Step ups 6\" step 2x10 R leading Fwd/Lateral   Standing heel raises off step 2x10 Bilat (N)  Standing hip flexion 2x10  ea LE   Standing hip abduction 2x10 ea LE   Standing hip extension 2x10 Bilat (N)  Seated hip adduction 2x10 5\" hold (N)  Seated hip abduction [place] Tband 2x10 (N)  SLR 2x10 R (N)  SLS 3x30\" R (N)    Not today: 11-20-24  supine quad sets 2x10   heel slides 10\" x5, 10\" x5 with strap   ankle pumps 2x10   prone lying for knee extension (X)    Manual Therapy: 30 minutes, 2 units  IASTM/STM to R IT band, gastroc, HS (N)     Neuromuscular Reeducation: Not today 11-20-24  [place] E-Stim: Quad sets 10/10 10' in long sitting     Gait: (X)  Weight shifts in // bars: FWD, sideways, BWD x10 ea (N)  WBAT gait in // bars x 2 laps (N)  WBAT gait with crutches to front lobby - working on R heel strike and toe off, with knee extended during stance phase     OP EDUCATION:  Access Code: [email or web address]  URL: [email or web address]  Date: 11/26/2024  Prepared by: Bonny Koroma    Exercises  - Supine Active Straight Leg Raise  - 1 x daily - 7 x weekly - 3 sets - 10 reps  - Hip Abduction with Resistance Loop  - 1 x daily - 7 x weekly - 3 sets - 10 reps  - Hip Extension with Resistance Loop  - 1 x daily - 7 x weekly - 3 sets - 10 reps  - Standing Hip Flexion with Resistance Loop  - 1 x daily - 7 x weekly - 3 sets - 10 reps  - Seated Hamstring Curl with Anchored Resistance  - 1 x daily - 7 x weekly - 3 sets - 10 reps  - Supine ITB Stretch with Strap  - 1 x daily - 7 x weekly - 3 reps - 30 hold  - Standing Heel Raise  - 1 x daily " - 7 x weekly - 3 sets - 10 reps  - Seated Long Arc Quad  - 1 x daily - 7 x weekly - 3 sets - 10 reps     Access Code: [email or web address]  URL: [email or web address]  Date: 10/30/2024  Prepared by: Bonny Ivory     Exercises  - Supine Quad Set  - 1 x daily - 7 x weekly - 3 sets - 10 reps  - Supine Ankle Pumps  - 1 x daily - 7 x weekly - 3 sets - 10 reps  - Seated Knee Flexion AAROM  - 1 x daily - 7 x weekly - 3 sets - 10 reps    Goals:  Active       R knee goals       Patient will report full understanding and compliance with HEP in 2 weeks in order to maximize therapeutic potential.         Start:  10/30/24    Expected End:  12/29/24            Patient will demonstrate at least a 10 point improvement in outcome measure to demonstrate improved overall quality of life and functional mobility.  6 weeks       Start:  10/30/24    Expected End:  12/29/24            STG: Patient will perform supine SLR on R LE without quad leg in 4 week.        Start:  10/30/24    Expected End:  12/29/24            Patient will demonstrate improved AROM of R knee extension motion to 0 deg (neutral) in order to improve gait mechanics. 4-6 weeks         Start:  10/30/24    Expected End:  12/29/24            Patient will demonstrate improved AROM of R knee flexion motion from 108 degrees to 140 degrees in order to achieve full knee ROM for functional mobility. 4-6 weeks         Start:  10/30/24    Expected End:  12/29/24            patient will demonstrate proper heel strike and normal gait mechanics when ambulating without assistive device. 6 weeks        Start:  10/30/24    Expected End:  12/29/24

## 2024-11-30 ENCOUNTER — OFFICE VISIT (OUTPATIENT)
Dept: URGENT CARE | Facility: CLINIC | Age: 24
End: 2024-11-30
Payer: COMMERCIAL

## 2024-11-30 ENCOUNTER — TELEPHONE (OUTPATIENT)
Dept: URGENT CARE | Facility: CLINIC | Age: 24
End: 2024-11-30

## 2024-11-30 VITALS
DIASTOLIC BLOOD PRESSURE: 69 MMHG | WEIGHT: 120 LBS | BODY MASS INDEX: 18.83 KG/M2 | HEART RATE: 107 BPM | SYSTOLIC BLOOD PRESSURE: 101 MMHG | TEMPERATURE: 97.7 F | RESPIRATION RATE: 16 BRPM | HEIGHT: 67 IN | OXYGEN SATURATION: 100 %

## 2024-11-30 DIAGNOSIS — J06.9 ACUTE UPPER RESPIRATORY INFECTION: Primary | ICD-10-CM

## 2024-11-30 LAB
POC CORONAVIRUS 2019 BY PCR (COV19): NOT DETECTED
POC FLU A RESULT: NOT DETECTED
POC FLU B RESULT: NOT DETECTED
POC RSV PCR: NOT DETECTED

## 2024-11-30 PROCEDURE — 99213 OFFICE O/P EST LOW 20 MIN: CPT | Performed by: NURSE PRACTITIONER

## 2024-11-30 RX ORDER — ALBUTEROL SULFATE 90 UG/1
2 INHALANT RESPIRATORY (INHALATION) EVERY 6 HOURS PRN
Qty: 18 G | Refills: 0 | Status: SHIPPED | OUTPATIENT
Start: 2024-11-30 | End: 2025-11-30

## 2024-11-30 RX ORDER — METHYLPREDNISOLONE 4 MG/1
TABLET ORAL
Qty: 21 TABLET | Refills: 0 | Status: SHIPPED | OUTPATIENT
Start: 2024-11-30

## 2024-11-30 NOTE — PROGRESS NOTES
24 y.o. female presents for evaluation of URI.  Symptoms including cough, congestion, body aches, malaise, and headache have been present for 4 days and refractory to OTC meds.  No fever, chills, nausea, vomiting, abdominal pain, CP, or SOB.  No exacerbating factors. Has known exposure to COVID 19 from coworkers. Was seen and evaluated here on 11/6 and reports completed resolution of symptoms.    Vitals:    11/30/24 1022   BP: 101/69   Pulse: 107   Resp: 16   Temp: 36.5 °C (97.7 °F)   SpO2: 100%       Allergies   Allergen Reactions    Cinnamon Shortness of breath     cinnamon    Pomegranate Fruit Extract Shortness of breath     pomagranate    Sulfa (Sulfonamide Antibiotics) Other    Sulfamethoxazole-Trimethoprim Hives and Other       Medication Documentation Review Audit       Reviewed by ROMY Torres (Nurse Practitioner) on 11/30/24 at 1032      Medication Order Taking? Sig Documenting Provider Last Dose Status   albuterol 2.5 mg /3 mL (0.083 %) nebulizer solution [id number] Yes Inhale 3 mL (2.5 mg) every 6 hours if needed for wheezing. Historical Provider, MD  Active   albuterol 90 mcg/actuation inhaler [id number] Yes Inhale 2 puffs every 4 hours if needed for wheezing. Lambert Chaudhari MD  Active   amphetamine-dextroamphetamine XR (Adderall XR) 20 mg 24 hr capsule [id number] Yes Take 1 capsule (20 mg) by mouth once daily in the morning. Do not crush or chew. Lambert Chaudhari MD  Active   aspirin 81 mg EC tablet [id number] Yes Take 1 tablet (81 mg) by mouth once daily. Ed Maynard MD  Active   azithromycin (Zithromax) 250 mg tablet [id number]  2 tabs po day 1; 1 tab po every day days 2-5   Patient not taking: Reported on 11/30/2024    Alexis Tobin PA-C  Active   brompheniramine-pseudoeph-DM (Bromfed DM) 2-30-10 mg/5 mL syrup [id number]  Take 5 mL by mouth every 4 hours if needed for allergies, congestion or cough.   Patient not taking: Reported on 11/30/2024    Alexis Tobin PA-C  Active    brompheniramine-pseudoeph-DM (Bromfed DM) 2-30-10 mg/5 mL syrup [id number]  Take 5 mL by mouth every 4 hours if needed for allergies, congestion or cough.   Patient not taking: Reported on 2024    Alexis Tobin PA-C  Active   busPIRone (Buspar) 5 mg tablet [id number] Yes Take 1 tablet (5 mg) by mouth 3 times a day as needed (anxiety). Eim Cox MD  Active   EPINEPHrine 0.3 mg/0.3 mL injection syringe [id number] Yes 0.3 MILLIGRAM(S) INTRAMUSCULARLY ONCE A DAY, AS NEEDED FOR SEVERE ALLERGIC REACTION Lambert Chaudhari MD  Active   famotidine (Pepcid) 20 mg tablet [id number]  Take 1 tablet (20 mg) by mouth 2 times a day.   Patient taking differently: Take 1 tablet (20 mg) by mouth 2 times a day as needed.    Emi Cox MD   24 426   fluticasone (Flonase) 50 mcg/actuation nasal spray [id number] Yes Administer 1 spray into each nostril once daily. Shake gently. Before first use, prime pump. After use, clean tip and replace cap. LAY Torres-CNP  Active   methylPREDNISolone (Medrol Dospak) 4 mg tablets [id number]  Take as directed on package.   Patient not taking: Reported on 2024    Alexis Tobin PA-C  Active   metoprolol succinate XL (Toprol-XL) 25 mg 24 hr tablet [id number] Yes Take 1 tablet (25 mg) by mouth once daily. Do not crush or chew. Lambert Chaudhari MD  Active   mometasone (Asmanex) 110 mcg/ actuation (30) inhaler [id number]  Inhale 1 puff once daily. Rinse mouth with water after use to reduce aftertaste and incidence of candidiasis. Do not swallow. Lambert Chaudhari MD   10/14/24 235   omeprazole (PriLOSEC) 20 mg DR capsule [id number]  Take 1 capsule (20 mg) by mouth once daily in the morning. Take before meals. Do not crush or chew. Lambert Chaudhari MD   10/14/24 8953   ondansetron (Zofran) 4 mg tablet [id number] Yes Take 1 tablet (4 mg) by mouth every 8 hours if needed for nausea or vomiting. Idania Holloway MD  Active   pedi multivit  no.17 w-fluoride (Poly-Vi-Cony) 0.5 mg tablet,chewable [id number] Yes Chew 1 tablet once daily. Historical Provider, MD  Active   SUMAtriptan (Imitrex) 50 mg tablet [id number] Yes Take 1 tablet (50 mg) by mouth 1 time if needed for migraine. May repeat after 2 hours. Emi Cox MD  Active                    Past Medical History:   Diagnosis Date    Anxiety and depression     Asthma attack ([facility])     Attention-deficit hyperactivity disorder, predominantly hyperactive type     Attention deficit hyperactivity disorder (ADHD), predominantly hyperactive type    Concussion with no loss of consciousness 03/02/2018    Contusion of left hand 04/18/2023    Groin abscess 04/18/2023    Hand sprain, left, sequela 04/18/2023    Hematuria 04/18/2023    Left wrist pain 04/18/2023    Left wrist sprain, sequela 04/18/2023    Lump of skin 04/18/2023    Mitral valve prolapse     Numbness of fingers 04/18/2023    Other conditions influencing health status 09/23/2020    Menarche    Personal history of other diseases of the female genital tract     History of ovarian cyst    Personal history of other diseases of the respiratory system     History of asthma    PONV (postoperative nausea and vomiting)     Post-op pain 04/18/2023    Right knee pain 04/18/2023    Stiffness of right knee, not elsewhere classified 04/18/2023       Past Surgical History:   Procedure Laterality Date    FOREIGN BODY REMOVAL Right 06/20/2024    NEXPLANON REMOVAL/ DR BOLANOS    KNEE ARTHROSCOPY W/ MENISCECTOMY Right 10/14/2024    KNEE SURGERY      WISDOM TOOTH EXTRACTION         ROS  See HPI    Physical Exam  Vitals and nursing note reviewed.   Constitutional:       Appearance: She is ill-appearing (mildly).   HENT:      Head: Normocephalic and atraumatic.      Right Ear: Tympanic membrane and ear canal normal.      Left Ear: Tympanic membrane and ear canal normal.      Nose: Congestion present.      Mouth/Throat:      Mouth: Mucous membranes are moist.       Pharynx: Oropharynx is clear.   Eyes:      Extraocular Movements: Extraocular movements intact.      Conjunctiva/sclera: Conjunctivae normal.      Pupils: Pupils are equal, round, and reactive to light.   Cardiovascular:      Rate and Rhythm: Normal rate.   Pulmonary:      Effort: Pulmonary effort is normal.      Breath sounds: Normal breath sounds.   Lymphadenopathy:      Cervical: Cervical adenopathy present.   Skin:     General: Skin is warm and dry.   Neurological:      General: No focal deficit present.      Mental Status: She is alert and oriented to person, place, and time.   Psychiatric:         Mood and Affect: Mood normal.         Behavior: Behavior normal.       Recent Results (from the past hour)   POCT SARS-COV-2/FLU/RSV PCR SYMPTOMATIC manually resulted    Collection Time: 11/30/24 11:07 AM   Result Value Ref Range    POC Coronavirus 2019, PCR Not Detected Not Detected    POC Flu A Result Not Detected Not Detected    POC Flu B Result Not Detected Not Detected    POC RSV PCR Not Detected Not Detected       Assessment/Plan/MDM  Jessi was seen today for flu symptoms.  Diagnoses and all orders for this visit:  Acute upper respiratory infection (Primary)  -     POCT SARS-COV-2/FLU/RSV PCR SYMPTOMATIC manually resulted  -     methylPREDNISolone (Medrol Dospak) 4 mg tablets; Take as directed on package.  -     albuterol 90 mcg/actuation inhaler; Inhale 2 puffs every 6 hours if needed for wheezing.    Encouraged pt to use otc cold remedies PRN, push PO fluids and rest. Patient's clinical presentation is otherwise unremarkable at this time. Patient is discharged with instructions to follow-up with primary care or seek emergency medical attention for worsening symptoms or any new concerns.    I did personally review Jessi's past medical history, surgical history, social history, as well as family history (when relevant).  In this case, I also oversaw the her drug management by reviewing her medication list,  allergy list, as well as the medications that I prescribed during the UC course and/or recommended as an out-patient (including possible OTC medications such as acetaminophen, NSAIDs , etc).    After reviewing the items above, I did look at previous medical documentation, such as recent hospitalizations, office visits, and/or recent consultations with PCP/specialist.                          SDOH:   Another factor that I considered in Jessi's care was her Social Determinants of Health (SDOH). During this UC encounter, she did not have social determinants of health. Those SDOH influencing Jessi's care are: none      Frantz Onofre CNP  [facility]  [phone]

## 2024-11-30 NOTE — TELEPHONE ENCOUNTER
Called patient with test results informed her she was negative for covid flu and rsv and the provider sent her medication to the pharmacy of her choice.

## 2024-12-02 ENCOUNTER — APPOINTMENT (OUTPATIENT)
Dept: PHYSICAL THERAPY | Facility: CLINIC | Age: 24
End: 2024-12-02
Payer: COMMERCIAL

## 2024-12-04 ENCOUNTER — APPOINTMENT (OUTPATIENT)
Dept: PHYSICAL THERAPY | Facility: CLINIC | Age: 24
End: 2024-12-04
Payer: COMMERCIAL

## 2024-12-05 ENCOUNTER — TELEPHONE (OUTPATIENT)
Dept: OBSTETRICS AND GYNECOLOGY | Facility: CLINIC | Age: 24
End: 2024-12-05
Payer: COMMERCIAL

## 2024-12-05 NOTE — TELEPHONE ENCOUNTER
Patient called and was seen in  for Nexplanon removal in your note you put her on OCP but her script , can you send her refills to get her to appointment at the end of .

## 2024-12-06 DIAGNOSIS — Z30.41 ENCOUNTER FOR SURVEILLANCE OF CONTRACEPTIVE PILLS: Primary | ICD-10-CM

## 2024-12-06 RX ORDER — DROSPIRENONE AND ETHINYL ESTRADIOL 0.03MG-3MG
1 KIT ORAL DAILY
Qty: 28 TABLET | Refills: 7 | Status: SHIPPED | OUTPATIENT
Start: 2024-12-06 | End: 2025-12-06

## 2024-12-09 ENCOUNTER — APPOINTMENT (OUTPATIENT)
Dept: PHYSICAL THERAPY | Facility: CLINIC | Age: 24
End: 2024-12-09
Payer: COMMERCIAL

## 2024-12-09 ENCOUNTER — APPOINTMENT (OUTPATIENT)
Dept: PRIMARY CARE | Facility: CLINIC | Age: 24
End: 2024-12-09
Payer: COMMERCIAL

## 2024-12-10 ENCOUNTER — APPOINTMENT (OUTPATIENT)
Dept: ORTHOPEDIC SURGERY | Facility: CLINIC | Age: 24
End: 2024-12-10
Payer: COMMERCIAL

## 2024-12-10 ENCOUNTER — HOSPITAL ENCOUNTER (OUTPATIENT)
Dept: RADIOLOGY | Facility: EXTERNAL LOCATION | Age: 24
Discharge: HOME | End: 2024-12-10

## 2024-12-10 DIAGNOSIS — G89.18 POSTOPERATIVE PAIN OF LEFT KNEE: ICD-10-CM

## 2024-12-10 DIAGNOSIS — M25.562 POSTOPERATIVE PAIN OF LEFT KNEE: ICD-10-CM

## 2024-12-10 PROCEDURE — 76882 US LMTD JT/FCL EVL NVASC XTR: CPT | Performed by: SPECIALIST

## 2024-12-10 PROCEDURE — 99024 POSTOP FOLLOW-UP VISIT: CPT | Performed by: SPECIALIST

## 2024-12-10 PROCEDURE — 1036F TOBACCO NON-USER: CPT | Performed by: SPECIALIST

## 2024-12-10 ASSESSMENT — PAIN - FUNCTIONAL ASSESSMENT: PAIN_FUNCTIONAL_ASSESSMENT: 0-10

## 2024-12-10 ASSESSMENT — PAIN SCALES - GENERAL: PAINLEVEL_OUTOF10: 5 - MODERATE PAIN

## 2024-12-10 ASSESSMENT — PAIN DESCRIPTION - DESCRIPTORS: DESCRIPTORS: SHARP

## 2024-12-10 NOTE — PROGRESS NOTES
Assessment/Plan   Encounter Diagnoses:  Postoperative pain of left knee  Status post medial meniscus repair of right knee  Assessment: Right knee 8 weeks and 1 day status post 10/14/2024 subtotal lateral meniscectomy.  She comes in today stating that she is making progress.  Her pain level is coming down.  She has a 20 pound lifting restriction at work.  Will continue this.    Plan:  Continue the physical therapy and home exercise program.  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  She should not be required to lift more than 20 pounds.    Follow-up in 6 weeks for reevaluation.  Advance her activities of walking       Subjective    Patient ID: Jessi Alvarado is a 24 y.o. female.    Chief Complaint: Pain and Post-op of the Right Knee (Sx,- RIGHT KNEE MENISCAL REPAIR 10/14/2024/)     Last Surgery: Repair Arthroscopy Knee Meniscus - Right  Last Surgery Date: 10/14/2024    HPI  24-year-old who is 8 weeks and 1 day status post arthroscopy of the right knee with a subtotal lateral meniscectomy.  She is making good progress.    OBJECTIVE: ORTHO EXAM  Left knee exam  Portals are sealed and healed.  She has no effusion.  She was nontender to palpation except in the posterior lateral corner where she had some mild pain.  Her thigh is supple and nontender.  Calves are supple and nontender bilaterally.  Neurovascularly intact distally.      IMAGE RESULTS:  Point of Care Ultrasound  These images are not reportable by radiology and will not be interpreted   by  Radiologists.      ULTRASOUND  DIAGNOSTIC ULTRASOUND REPORT FINAL: Right KNEE  Sonographer: Ed Maynard MD  Indication: Knee Pain  Procedure: Ultrasound, extremity, nonvascular, real-time, COMPLETE, anatomic specific  Technique: B-Mode Ultrasound Examination performed using 6- 9 MHz linear transducer with [employer]  STUDY TYPE:   1. ULTRASOUND EXTREMITY  2. REAL TIME WITH IMAGE DOCUMENTATION  3. NON-VASCULAR  4. COMPLETE  STUDY, INCLUDING BUT NOT LIMITED TO MUSCLE, TENDONS, LIGAMENTS, SOFT TISSUES, ADIPOSE TISSUE AND SUBCUTANEOUS TISSUE.  Site: KNEE   Live ultrasound was performed with of patient's  KNEE and PERMANENTLY documented. I personally performed the ultrasound and reviewed the findings. These show:    Marlee-articular evaluation:   An intact Quadriceps Tendon with the Quadriceps Muscle fibers showing normal striations Quadriceps Tendon demonstrating normal fibrillar pattern. . The Patellar Tendon demonstrates normal fibrillar pattern and is intact.   No significant soft tissue fluid collection/abscess appreciated.     Joint Evaluation:  The lateral joint line shows an intact LCL.   Medial joint line exam shows an intact MCL.   The patellar tendon was within normal limits.  Scant joint effusion noted.     The patient tolerated the procedure well.        Procedures     Orders Placed This Encounter    Point of Care Ultrasound

## 2024-12-10 NOTE — ASSESSMENT & PLAN NOTE
Assessment: Right knee 8 weeks and 1 day status post 10/14/2024 subtotal lateral meniscectomy.  She comes in today stating that she is making progress.  Her pain level is coming down.  She has a 20 pound lifting restriction at work.  Will continue this.    Plan:  Continue the physical therapy and home exercise program.  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  She should not be required to lift more than 20 pounds.    Follow-up in 6 weeks for reevaluation.  Advance her activities of walking

## 2024-12-11 ENCOUNTER — APPOINTMENT (OUTPATIENT)
Dept: PHYSICAL THERAPY | Facility: CLINIC | Age: 24
End: 2024-12-11
Payer: COMMERCIAL

## 2024-12-16 ENCOUNTER — APPOINTMENT (OUTPATIENT)
Dept: PHYSICAL THERAPY | Facility: CLINIC | Age: 24
End: 2024-12-16
Payer: COMMERCIAL

## 2024-12-19 ENCOUNTER — APPOINTMENT (OUTPATIENT)
Dept: PHYSICAL THERAPY | Facility: CLINIC | Age: 24
End: 2024-12-19
Payer: COMMERCIAL

## 2025-01-06 ENCOUNTER — APPOINTMENT (OUTPATIENT)
Dept: PRIMARY CARE | Facility: CLINIC | Age: 25
End: 2025-01-06
Payer: COMMERCIAL

## 2025-01-07 ENCOUNTER — OFFICE VISIT (OUTPATIENT)
Dept: PRIMARY CARE | Facility: CLINIC | Age: 25
End: 2025-01-07
Payer: COMMERCIAL

## 2025-01-07 VITALS
BODY MASS INDEX: 17.89 KG/M2 | DIASTOLIC BLOOD PRESSURE: 69 MMHG | HEART RATE: 101 BPM | SYSTOLIC BLOOD PRESSURE: 106 MMHG | WEIGHT: 114 LBS | HEIGHT: 67 IN

## 2025-01-07 DIAGNOSIS — J01.90 ACUTE SINUSITIS, RECURRENCE NOT SPECIFIED, UNSPECIFIED LOCATION: ICD-10-CM

## 2025-01-07 DIAGNOSIS — J20.9 ACUTE BRONCHITIS, UNSPECIFIED ORGANISM: Primary | ICD-10-CM

## 2025-01-07 PROCEDURE — 99213 OFFICE O/P EST LOW 20 MIN: CPT | Performed by: INTERNAL MEDICINE

## 2025-01-07 PROCEDURE — 3008F BODY MASS INDEX DOCD: CPT | Performed by: INTERNAL MEDICINE

## 2025-01-07 PROCEDURE — 1036F TOBACCO NON-USER: CPT | Performed by: INTERNAL MEDICINE

## 2025-01-07 RX ORDER — AZITHROMYCIN 250 MG/1
TABLET, FILM COATED ORAL
Qty: 6 TABLET | Refills: 0 | Status: SHIPPED | OUTPATIENT
Start: 2025-01-07 | End: 2025-01-12

## 2025-01-07 RX ORDER — COVID-19 ANTIGEN TEST
KIT MISCELLANEOUS
Qty: 1 KIT | Refills: 0 | Status: SHIPPED | OUTPATIENT
Start: 2025-01-07

## 2025-01-07 RX ORDER — PREDNISONE 10 MG/1
10 TABLET ORAL 3 TIMES DAILY
Qty: 9 TABLET | Refills: 0 | Status: SHIPPED | OUTPATIENT
Start: 2025-01-07 | End: 2025-01-10

## 2025-01-07 ASSESSMENT — ENCOUNTER SYMPTOMS
WHEEZING: 0
ENDOCRINE NEGATIVE: 1
NEUROLOGICAL NEGATIVE: 1
COUGH: 1
SINUS PRESSURE: 1
SINUS PAIN: 1
MUSCULOSKELETAL NEGATIVE: 1
HEADACHES: 0
CONFUSION: 0
NUMBNESS: 0
NAUSEA: 0
PSYCHIATRIC NEGATIVE: 1
CONSTITUTIONAL NEGATIVE: 1
LIGHT-HEADEDNESS: 0
HEMATOLOGIC/LYMPHATIC NEGATIVE: 1
CARDIOVASCULAR NEGATIVE: 1
DYSURIA: 0
BACK PAIN: 0
CONSTIPATION: 0
FEVER: 0
ADENOPATHY: 0
RHINORRHEA: 1
EYE DISCHARGE: 0
ABDOMINAL DISTENTION: 0
ALLERGIC/IMMUNOLOGIC NEGATIVE: 1
JOINT SWELLING: 0
AGITATION: 0
VOMITING: 0
EYES NEGATIVE: 1
DIARRHEA: 1
CHILLS: 0
PALPITATIONS: 0
NECK STIFFNESS: 0
SHORTNESS OF BREATH: 1
ABDOMINAL PAIN: 0

## 2025-01-07 NOTE — PROGRESS NOTES
Subjective   Patient ID: Jessi Alvarado is a 24 y.o. female who presents for Follow-up (Pt here today co body aches headaches and chest congestion stomach pain and diarrhea).  Cough ha x 2 days  Runny nose  Body ache  Chills and sweats  No fever    ibuprofen        Review of Systems   Constitutional: Negative.  Negative for chills and fever.   HENT:  Positive for rhinorrhea, sinus pressure and sinus pain. Negative for congestion.    Eyes: Negative.  Negative for discharge.   Respiratory:  Positive for cough and shortness of breath (mild). Negative for wheezing.    Cardiovascular: Negative.  Negative for chest pain, palpitations and leg swelling.   Gastrointestinal:  Positive for diarrhea. Negative for abdominal distention, abdominal pain, constipation, nausea and vomiting.   Endocrine: Negative.    Genitourinary: Negative.  Negative for dysuria and urgency.   Musculoskeletal: Negative.  Negative for back pain, joint swelling and neck stiffness.   Skin: Negative.  Negative for rash.   Allergic/Immunologic: Negative.  Negative for immunocompromised state.   Neurological: Negative.  Negative for light-headedness, numbness and headaches.   Hematological: Negative.  Negative for adenopathy.   Psychiatric/Behavioral: Negative.  Negative for agitation, behavioral problems and confusion.    All other systems reviewed and are negative.      Objective   Physical Exam  Vitals reviewed.   Constitutional:       General: She is not in acute distress.     Appearance: Normal appearance.   HENT:      Head: Normocephalic and atraumatic.      Nose: Nose normal.   Eyes:      Conjunctiva/sclera: Conjunctivae normal.      Pupils: Pupils are equal, round, and reactive to light.   Neck:      Vascular: No carotid bruit.   Cardiovascular:      Rate and Rhythm: Normal rate and regular rhythm.      Pulses: Normal pulses.      Heart sounds:      No gallop.   Pulmonary:      Effort: Pulmonary effort is normal. No respiratory distress.       "Breath sounds: Normal breath sounds. No wheezing.   Abdominal:      General: Bowel sounds are normal.      Palpations: Abdomen is soft.      Tenderness: There is no abdominal tenderness.   Musculoskeletal:         General: Normal range of motion.      Cervical back: Normal range of motion. No rigidity.   Lymphadenopathy:      Cervical: No cervical adenopathy.   Skin:     General: Skin is warm.      Findings: No rash.   Neurological:      General: No focal deficit present.      Mental Status: She is alert and oriented to person, place, and time.   Psychiatric:         Mood and Affect: Mood normal.         Behavior: Behavior normal.       /69 (BP Location: Right arm, Patient Position: Sitting)   Pulse 101   Ht 1.702 m (5' 7\")   Wt 51.7 kg (114 lb)   BMI 17.85 kg/m²    Hemoglobin A1C   Date/Time Value Ref Range Status   09/08/2023 09:32 AM 5.4 % Final     Comment:          Diagnosis of Diabetes-Adults   Non-Diabetic: < or = 5.6%   Increased risk for developing diabetes: 5.7-6.4%   Diagnostic of diabetes: > or = 6.5%  .       Monitoring of Diabetes                Age (y)     Therapeutic Goal (%)   Adults:          >18           <7.0   Pediatrics:    13-18           <7.5                   7-12           <8.0                   0- 6            7.5-8.5   American Diabetes Association. Diabetes Care 33(S1), Jan 2010.     Assessment/Plan   Problem List Items Addressed This Visit    None  Visit Diagnoses       Acute bronchitis, unspecified organism    -  Primary    Relevant Medications    azithromycin (Zithromax) 250 mg tablet    predniSONE (Deltasone) 10 mg tablet    COVID-19 antigen test (COVID-19 At-Home Test) kit    Acute sinusitis, recurrence not specified, unspecified location        Relevant Medications    azithromycin (Zithromax) 250 mg tablet    predniSONE (Deltasone) 10 mg tablet             PT. WAS INSTRUCTED TO INCREASE FLUID INTAKE ,TAKE TYLENOL 650 MG PO Q6H/PRN FOR PAIN OR FEVER AND TAKE ROBITUSSIN OTC " 2 TSP Q 6H/PRN FOR COUGH.      Fu prn

## 2025-01-07 NOTE — LETTER
January 7, 2025     Patient: Jessi Alvarado   YOB: 2000   Date of Visit: 1/7/2025       To Whom It May Concern:    Jessi Alvarado was seen in my clinic on 1/7/2025 at 2:00 pm. Please excuse Jessi for her absence from work on 1/6, 1/7 and 1/8/25 .    If you have any questions or concerns, please don't hesitate to call.         Sincerely,         Lambert Chaudhari MD        CC: No Recipients

## 2025-01-21 ENCOUNTER — APPOINTMENT (OUTPATIENT)
Dept: ORTHOPEDIC SURGERY | Facility: CLINIC | Age: 25
End: 2025-01-21
Payer: COMMERCIAL

## 2025-01-21 ENCOUNTER — DOCUMENTATION (OUTPATIENT)
Dept: OCCUPATIONAL THERAPY | Facility: CLINIC | Age: 25
End: 2025-01-21

## 2025-01-21 ENCOUNTER — HOSPITAL ENCOUNTER (OUTPATIENT)
Dept: RADIOLOGY | Facility: EXTERNAL LOCATION | Age: 25
Discharge: HOME | End: 2025-01-21

## 2025-01-21 DIAGNOSIS — M23.91 INTERNAL DERANGEMENT OF KNEE JOINT, RIGHT: ICD-10-CM

## 2025-01-21 PROCEDURE — 76882 US LMTD JT/FCL EVL NVASC XTR: CPT | Performed by: SPECIALIST

## 2025-01-21 PROCEDURE — 99214 OFFICE O/P EST MOD 30 MIN: CPT | Performed by: SPECIALIST

## 2025-01-21 PROCEDURE — 1036F TOBACCO NON-USER: CPT | Performed by: SPECIALIST

## 2025-01-21 ASSESSMENT — PAIN DESCRIPTION - DESCRIPTORS: DESCRIPTORS: DISCOMFORT;DULL

## 2025-01-21 ASSESSMENT — PAIN - FUNCTIONAL ASSESSMENT: PAIN_FUNCTIONAL_ASSESSMENT: 0-10

## 2025-01-21 ASSESSMENT — PAIN SCALES - GENERAL: PAINLEVEL_OUTOF10: 1

## 2025-01-21 NOTE — PROGRESS NOTES
Assessment/Plan   Encounter Diagnoses:  Internal derangement of knee joint, right  Internal derangement of knee joint, right  Assessment: 3 months and 7 days status post 10/14/2024 right knee meniscal surgery.  Comes in today stating she is back to activities of daily living without any pain.  She is a little unsure of carrying laundry up and down the stairs but otherwise has returned to all of her other activities of daily living.  She wants to be released to full duty at work.    Plan:  Full duty at work.  Follow-up in 3 months for reevaluation.  Continue to work on her confidence going up and down stairs.  Home exercise program.       Subjective    Patient ID: Jessi Alvarado is a 24 y.o. female.    Chief Complaint: Pain and Post-op of the Right Knee (Sx,- RIGHT KNEE MENISCAL REPAIR 10/14/2024/X-RAYS 10-23-24/PAIN RATE 1/DOING THERAPY AT HOME WITH BANDS/NO LONGER WEARING A BRACE)     Last Surgery: Repair Arthroscopy Knee Meniscus - Right  Last Surgery Date: 10/14/2024    HPI  24-year-old who status post right knee arthroscopy and meniscal surgery.  She states she is not having the symptoms she had prior to her surgery.  She is still a little unsure of going up and down stairs with laundry.  Otherwise she is returned to activities of daily living.  She is confident she can get back to her full duties at work.        OBJECTIVE: ORTHO EXAM    Right knee:  Skin healthy and intact  No gross swelling or ecchymosis  Her portals are sealed and healed.  Alignment: Neutral  Effusion: None  ROM: 0 degrees Extension   130 degrees Flexion  No crepitance with range of motion  No pain with internal rotation of the hip  Tenderness to palpation: None     No laxity to valgus stress  No laxity to varus stress  Negative Lachman´s test  Negative posterior drawer test  No pain with Agueda´s test     Neurovascular exam normal distally  2+ DP pulse and good cap refill    IMAGE RESULTS:  Point of Care Ultrasound  These images are  not reportable by radiology and will not be interpreted   by  Radiologists.      ULTRASOUND  DIAGNOSTIC ULTRASOUND REPORT FINAL: Right KNEE  Sonographer: Ed Maynard MD  Indication: Knee Pain  Procedure: Ultrasound, extremity, nonvascular, real-time, COMPLETE, anatomic specific  Technique: B-Mode Ultrasound Examination performed using 6- 9 MHz linear transducer with [employer]  STUDY TYPE:   1. ULTRASOUND EXTREMITY  2. REAL TIME WITH IMAGE DOCUMENTATION  3. NON-VASCULAR  4. COMPLETE STUDY, INCLUDING BUT NOT LIMITED TO MUSCLE, TENDONS, LIGAMENTS, SOFT TISSUES, ADIPOSE TISSUE AND SUBCUTANEOUS TISSUE.  Site: KNEE   Live ultrasound was performed with of patient's  KNEE and PERMANENTLY documented. I personally performed the ultrasound and reviewed the findings. These show:    Marlee-articular evaluation:   An intact Quadriceps Tendon with the Quadriceps Muscle fibers showing normal striations Quadriceps Tendon demonstrating normal fibrillar pattern. . The Patellar Tendon demonstrates normal fibrillar pattern and is intact.   No significant soft tissue fluid collection/abscess appreciated.     Joint Evaluation:  The lateral joint line shows an intact LCL.   Medial joint line exam shows an intact MCL.   The patellar tendon was within normal limits.  No joint effusion noted.     The patient tolerated the procedure well.        Procedures     Orders Placed This Encounter    Point of Care Ultrasound

## 2025-01-21 NOTE — ASSESSMENT & PLAN NOTE
Assessment: 3 months and 7 days status post 10/14/2024 right knee meniscal surgery.  Comes in today stating she is back to activities of daily living without any pain.  She is a little unsure of carrying laundry up and down the stairs but otherwise has returned to all of her other activities of daily living.  She wants to be released to full duty at work.    Plan:  Full duty at work.  Follow-up in 3 months for reevaluation.  Continue to work on her confidence going up and down stairs.  Home exercise program.

## 2025-01-21 NOTE — PROGRESS NOTES
Occupational Therapy    Discharge Summary    Name: Jessi Alvarado  MRN: [id number]  : 2000  Date: 25    Discharge Summary: OT    Discharge Information: Date of discharge 25, Date of last visit 24, Date of evaluation 3/13/24, Number of attended visits 8, Referred by Reshma Myers CNP, and Referred for Sprain of thumb    Therapy Summary: Pt was improving with ROM and reduced pain. However, pt was authorized only so many in a certain amount of time and ran out of time. Attempted to get a new C-9 but that did not occur.    Discharge Status: Discharged     Rehab Discharge Reason: Other [facility] approval needed.

## 2025-01-29 ENCOUNTER — TELEPHONE (OUTPATIENT)
Dept: PRIMARY CARE | Facility: CLINIC | Age: 25
End: 2025-01-29
Payer: COMMERCIAL

## 2025-01-29 NOTE — TELEPHONE ENCOUNTER
Patient is going to be without of her adderall for approximately 9 days she was wondering what side effects she should look out for.

## 2025-02-07 ENCOUNTER — OFFICE VISIT (OUTPATIENT)
Dept: URGENT CARE | Facility: CLINIC | Age: 25
End: 2025-02-07
Payer: COMMERCIAL

## 2025-02-07 VITALS
DIASTOLIC BLOOD PRESSURE: 71 MMHG | HEART RATE: 83 BPM | RESPIRATION RATE: 18 BRPM | SYSTOLIC BLOOD PRESSURE: 106 MMHG | OXYGEN SATURATION: 100 % | TEMPERATURE: 97.8 F | WEIGHT: 114 LBS | BODY MASS INDEX: 17.89 KG/M2 | HEIGHT: 67 IN

## 2025-02-07 DIAGNOSIS — G43.811 OTHER MIGRAINE WITH STATUS MIGRAINOSUS, INTRACTABLE: ICD-10-CM

## 2025-02-07 DIAGNOSIS — J06.9 VIRAL URI WITH COUGH: Primary | ICD-10-CM

## 2025-02-07 PROCEDURE — 99213 OFFICE O/P EST LOW 20 MIN: CPT | Performed by: NURSE PRACTITIONER

## 2025-02-07 PROCEDURE — 2500000004 HC RX 250 GENERAL PHARMACY W/ HCPCS (ALT 636 FOR OP/ED): Performed by: NURSE PRACTITIONER

## 2025-02-07 PROCEDURE — 96372 THER/PROPH/DIAG INJ SC/IM: CPT | Performed by: NURSE PRACTITIONER

## 2025-02-07 RX ORDER — DEXTROMETHORPHAN HYDROBROMIDE, GUAIFENESIN AND PSEUDOEPHEDRINE HYDROCHLORIDE 15; 400; 60 MG/1; MG/1; MG/1
1 TABLET ORAL EVERY 6 HOURS PRN
Qty: 84 TABLET | Refills: 0 | Status: SHIPPED | OUTPATIENT
Start: 2025-02-07 | End: 2025-02-21

## 2025-02-07 RX ORDER — KETOROLAC TROMETHAMINE 30 MG/ML
60 INJECTION, SOLUTION INTRAMUSCULAR; INTRAVENOUS ONCE
Status: COMPLETED | OUTPATIENT
Start: 2025-02-07 | End: 2025-02-07

## 2025-02-07 RX ADMIN — KETOROLAC TROMETHAMINE 60 MG: 30 INJECTION, SOLUTION INTRAMUSCULAR at 13:39

## 2025-02-07 NOTE — PROGRESS NOTES
[facility] URGENT CARE   FELICE NOTE:      Name: Jessi Alvarado, 24 y.o.    CSN:[id number]   MRN:[id number]    PCP: Lambert Chaudhari MD    ALL:    Allergies   Allergen Reactions    Cinnamon Shortness of breath     cinnamon    Pomegranate Fruit Extract Shortness of breath     pomagranate    Sulfa (Sulfonamide Antibiotics) Other    Sulfamethoxazole-Trimethoprim Hives and Other       History:    Chief Complaint: Flu Symptoms (Cough, chest congestion, headaches, sore throat x 2 days )    Encounter Date: 2/7/2025      HPI: The history was obtained from the patient. Jessi is a 24 y.o. female, who presents with a chief complaint of Flu Symptoms (Cough, chest congestion, headaches, sore throat x 2 days ) Sinus pressure and pain, nasal congestion, bilateral ear pain/fullness, productive cough, mild sore throat, and fatigue. Denies shortness of breath, denies wheezing. Symptoms began 2 days ago, reports symptoms have progressively worsened since onset. Denies any body aches, abdominal pain, chest pain, rashes, urinary symptoms, vomiting, and diarrhea. Denies any lightheadedness or dizziness; no changes in mental status. No swelling in legs. Appetite is decreased; is able to eat and drink fluids without difficulty. Has been taking utilized PRN Imitrex, cold and flu without much relief; no other over-the-counter medications or home remedies for symptom management. Denies any recent antibiotic use      Also complains of reoccurring migraine to the right temporal region with mild nausea.  She has utilized as needed Imitrex with only mild improvement of symptoms.  Reports similar migraines in the past.    PMHx:    Past Medical History:   Diagnosis Date    Anxiety and depression     Asthma attack ([facility])     Attention-deficit hyperactivity disorder, predominantly hyperactive type     Attention deficit hyperactivity disorder (ADHD), predominantly hyperactive type    Concussion with no loss of consciousness  03/02/2018    Contusion of left hand 04/18/2023    Groin abscess 04/18/2023    Hand sprain, left, sequela 04/18/2023    Hematuria 04/18/2023    Left wrist pain 04/18/2023    Left wrist sprain, sequela 04/18/2023    Lump of skin 04/18/2023    Mitral valve prolapse     Numbness of fingers 04/18/2023    Other conditions influencing health status 09/23/2020    Menarche    Personal history of other diseases of the female genital tract     History of ovarian cyst    Personal history of other diseases of the respiratory system     History of asthma    PONV (postoperative nausea and vomiting)     Post-op pain 04/18/2023    Right knee pain 04/18/2023    Stiffness of right knee, not elsewhere classified 04/18/2023              Current Outpatient Medications   Medication Sig Dispense Refill    albuterol 2.5 mg /3 mL (0.083 %) nebulizer solution Inhale 3 mL (2.5 mg) every 6 hours if needed for wheezing.      albuterol 90 mcg/actuation inhaler Inhale 2 puffs every 4 hours if needed for wheezing. 18 g 11    albuterol 90 mcg/actuation inhaler Inhale 2 puffs every 6 hours if needed for wheezing. 18 g 0    busPIRone (Buspar) 5 mg tablet Take 1 tablet (5 mg) by mouth 3 times a day as needed (anxiety). 90 tablet 11    COVID-19 antigen test (COVID-19 At-Home Test) kit As before 1 kit 0    drospirenone-ethinyl estradioL (Alisha, 28,) 3-0.03 mg tablet Take 1 tablet by mouth once daily. 28 tablet 7    EPINEPHrine 0.3 mg/0.3 mL injection syringe 0.3 MILLIGRAM(S) INTRAMUSCULARLY ONCE A DAY, AS NEEDED FOR SEVERE ALLERGIC REACTION 1 each 0    fluticasone (Flonase) 50 mcg/actuation nasal spray Administer 1 spray into each nostril once daily. Shake gently. Before first use, prime pump. After use, clean tip and replace cap. 16 g 0    metoprolol succinate XL (Toprol-XL) 25 mg 24 hr tablet Take 1 tablet (25 mg) by mouth once daily. Do not crush or chew. 30 tablet 11    ondansetron (Zofran) 4 mg tablet Take 1 tablet (4 mg) by mouth every 8 hours if  needed for nausea or vomiting. 10 tablet 0    pedi multivit no.17 w-fluoride (Poly-Vi-Cony) 0.5 mg tablet,chewable Chew 1 tablet once daily.      SUMAtriptan (Imitrex) 50 mg tablet Take 1 tablet (50 mg) by mouth 1 time if needed for migraine. May repeat after 2 hours. 9 tablet 11    amphetamine-dextroamphetamine XR (Adderall XR) 20 mg 24 hr capsule Take 1 capsule (20 mg) by mouth once daily in the morning. Do not crush or chew. 30 capsule 0    mometasone (Asmanex) 110 mcg/ actuation (30) inhaler Inhale 1 puff once daily. Rinse mouth with water after use to reduce aftertaste and incidence of candidiasis. Do not swallow. 1 each 11    omeprazole (PriLOSEC) 20 mg DR capsule Take 1 capsule (20 mg) by mouth once daily in the morning. Take before meals. Do not crush or chew. 30 capsule 2    pseudoephedrine-DM-guaifenesin (Capmist DM) 60- mg tablet Take 1 tablet by mouth every 6 hours if needed (sinus congestion) for up to 14 days. 84 tablet 0     No current facility-administered medications for this visit.         PMSx:    Past Surgical History:   Procedure Laterality Date    FOREIGN BODY REMOVAL Right 06/20/2024    NEXPLANON REMOVAL/ DR BOLANOS    KNEE ARTHROSCOPY W/ MENISCECTOMY Right 10/14/2024    KNEE SURGERY      WISDOM TOOTH EXTRACTION         Fam Hx:   Family History   Problem Relation Name Age of Onset    No Known Problems Mother      ADD / ADHD Father      Cancer Maternal Grandmother      Hypertension Maternal Grandfather      IRMA disease Paternal Grandmother      Irritable bowel syndrome Paternal Grandmother      Hypertension Paternal Grandfather         SOC. Hx:     Social History     Socioeconomic History    Marital status: Single     Spouse name: Not on file    Number of children: Not on file    Years of education: Not on file    Highest education level: Not on file   Occupational History    Not on file   Tobacco Use    Smoking status: Former     Types: Cigarettes     Passive exposure: Never     Smokeless tobacco: Never   Vaping Use    Vaping status: Every Day    Last attempt to quit: 1/1/2024    Substances: Nicotine    Devices: Refillable tank   Substance and Sexual Activity    Alcohol use: Not Currently    Drug use: Never    Sexual activity: Defer     Birth control/protection: Implant   Other Topics Concern    Not on file   Social History Narrative    Not on file     Social Drivers of Health     Financial Resource Strain: Low Risk  (10/9/2023)    Received from [facility]    Overall Financial Resource Strain (CARDIA)     Difficulty of Paying Living Expenses: Not hard at all   Food Insecurity: No Food Insecurity (10/9/2023)    Received from [facility]    Hunger Vital Sign     Worried About Running Out of Food in the Last Year: Never true     Ran Out of Food in the Last Year: Never true   Transportation Needs: No Transportation Needs (10/9/2023)    Received from [facility]    PRAPARE - Transportation     Lack of Transportation (Medical): No     Lack of Transportation (Non-Medical): No   Physical Activity: Not on file   Stress: Not on file   Social Connections: Unknown (10/9/2023)    Received from [facility]    Social Connection and Isolation Panel [NHANES]     Frequency of Communication with Friends and Family: Not on file     Frequency of Social Gatherings with Friends and Family: Once a week     Attends Islam Services: Not on file     Active Member of Clubs or Organizations: Not on file     Attends Club or Organization Meetings: Not on file     Marital Status: Not on file   Intimate Partner Violence: Not on file   Housing Stability: Unknown (10/9/2023)    Received from [facility]    Housing Stability Vital Sign     Unable to Pay for Housing in the Last Year: No     Number of Places Lived in the Last Year: Not on file     Unstable Housing in the Last Year: No         Vitals:    02/07/25 1307   BP: 106/71   Pulse: 83   Resp: 18   Temp: 36.6 °C  (97.8 °F)   SpO2: 100%     51.7 kg (114 lb)          Physical Exam  Vitals and nursing note reviewed.   Constitutional:       Appearance: Normal appearance.   HENT:      Head: Normocephalic and atraumatic.      Right Ear: Hearing, ear canal and external ear normal. A middle ear effusion is present. Tympanic membrane is erythematous. Tympanic membrane is not bulging. Tympanic membrane has normal mobility.      Left Ear: Hearing, ear canal and external ear normal. A middle ear effusion is present. Tympanic membrane is erythematous. Tympanic membrane is not bulging. Tympanic membrane has normal mobility.      Nose: Congestion and rhinorrhea present. Rhinorrhea is purulent.      Right Sinus: Maxillary sinus tenderness and frontal sinus tenderness present.      Left Sinus: Maxillary sinus tenderness and frontal sinus tenderness present.      Mouth/Throat:      Lips: Pink.      Mouth: Mucous membranes are moist.      Pharynx: Uvula midline. Posterior oropharyngeal erythema present. No pharyngeal swelling or oropharyngeal exudate.      Tonsils: No tonsillar exudate.   Cardiovascular:      Rate and Rhythm: Normal rate and regular rhythm.      Heart sounds: Normal heart sounds.   Pulmonary:      Effort: Pulmonary effort is normal. No respiratory distress.      Breath sounds: Normal breath sounds. No stridor. No wheezing, rhonchi or rales.   Chest:      Chest wall: No tenderness.   Abdominal:      General: Bowel sounds are normal.   Skin:     General: Skin is warm and dry.   Neurological:      Mental Status: She is alert and oriented to person, place, and time.           LABORATORY @ RADIOLOGICAL IMAGING (if done):     Results for orders placed or performed in visit on 02/07/25 (from the past 24 hours)   POCT SARS-COV-2/FLU/RSV PCR SYMPTOMATIC manually resulted   Result Value Ref Range    POC Coronavirus 2019, PCR Not Detected Not Detected    POC Flu A Result Not Detected Not Detected    POC Flu B Result Not Detected Not  Detected    POC RSV PCR Not Detected Not Detected       ____________________________________________________________________    I did personally review Jessi's past medical history, surgical history, social history, as well as family history (when relevant).  In this case, I also oversaw the her drug management by reviewing her medication list, allergy list, as well as the medications that I prescribed during the UC course and/or recommended as an out-patient (including possible OTC medications such as acetaminophen, NSAIDs , etc).    After reviewing the items above, I did look at previous medical documentation, such as recent hospitalizations, office visits, and/or recent consultations with PCP/specialist.                          SDOH:   Another factor that I considered in Jessi's care was her Social Determinants of Health (SDOH). During this UC encounter, she did not have social determinants of health. Those SDOH influencing Jessi's care are: none      _____________________________________________________________________      UC COURSE/MEDICAL DECISION MAKING:    Jessi is a 24 y.o., who presents with a working diagnosis of   1. Viral URI with cough    2. Other migraine with status migrainosus, intractable        Jessi was seen today for flu symptoms.  Diagnoses and all orders for this visit:  Viral URI with cough (Primary)  -     POCT SARS-COV-2/FLU/RSV PCR SYMPTOMATIC manually resulted  -     pseudoephedrine-DM-guaifenesin (Capmist DM) 60- mg tablet; Take 1 tablet by mouth every 6 hours if needed (sinus congestion) for up to 14 days.  Other migraine with status migrainosus, intractable  -     ketorolac (Toradol) injection 60 mg         Plan of care reviewed with patient.  If symptoms change and/or worsen will follow-up with primary care provider, return to urgent care, or go to the nearest emergency department for further evaluation.  Patient states understanding and is agreeable with plan of  care.      LAY Bennett, JOSHUA   Advanced Practice Provider  [facility] URG[facility]    Please note: While the patient may or may not have received printed discharge paperwork, all relevant medical findings, test results, and treatment details are accessible through the electronic medical record system. The patient is encouraged to review their chart via the patient portal for comprehensive information and follow-up instructions.

## 2025-02-07 NOTE — LETTER
February 7, 2025     Patient: Jessi Alvarado   YOB: 2000   Date of Visit: 2/7/2025       To Whom It May Concern:    It is my medical opinion that Jessi Alvarado may return to work on 2/10/2025 .    If you have any questions or concerns, please don't hesitate to call.         Sincerely,        LAY Sahni-CNP    CC: No Recipients

## 2025-02-11 ENCOUNTER — APPOINTMENT (OUTPATIENT)
Dept: PRIMARY CARE | Facility: CLINIC | Age: 25
End: 2025-02-11
Payer: COMMERCIAL

## 2025-02-11 VITALS
HEIGHT: 67 IN | SYSTOLIC BLOOD PRESSURE: 125 MMHG | BODY MASS INDEX: 18.05 KG/M2 | DIASTOLIC BLOOD PRESSURE: 77 MMHG | WEIGHT: 115 LBS | HEART RATE: 101 BPM

## 2025-02-11 DIAGNOSIS — F90.2 ATTENTION DEFICIT HYPERACTIVITY DISORDER (ADHD), COMBINED TYPE: ICD-10-CM

## 2025-02-11 DIAGNOSIS — L70.0 ACNE VULGARIS: Primary | ICD-10-CM

## 2025-02-11 DIAGNOSIS — L91.8 SKIN TAG: ICD-10-CM

## 2025-02-11 PROCEDURE — 1036F TOBACCO NON-USER: CPT | Performed by: INTERNAL MEDICINE

## 2025-02-11 PROCEDURE — 11200 RMVL SKIN TAGS UP TO&INC 15: CPT | Performed by: INTERNAL MEDICINE

## 2025-02-11 PROCEDURE — 99214 OFFICE O/P EST MOD 30 MIN: CPT | Performed by: INTERNAL MEDICINE

## 2025-02-11 PROCEDURE — 3008F BODY MASS INDEX DOCD: CPT | Performed by: INTERNAL MEDICINE

## 2025-02-11 RX ORDER — DOXYCYCLINE 100 MG/1
100 CAPSULE ORAL 2 TIMES DAILY
Qty: 28 CAPSULE | Refills: 0 | Status: SHIPPED | OUTPATIENT
Start: 2025-02-11 | End: 2025-02-25

## 2025-02-11 RX ORDER — DEXTROAMPHETAMINE SACCHARATE, AMPHETAMINE ASPARTATE MONOHYDRATE, DEXTROAMPHETAMINE SULFATE AND AMPHETAMINE SULFATE 5; 5; 5; 5 MG/1; MG/1; MG/1; MG/1
20 CAPSULE, EXTENDED RELEASE ORAL EVERY MORNING
Qty: 30 CAPSULE | Refills: 0 | Status: SHIPPED | OUTPATIENT
Start: 2025-02-11 | End: 2025-03-13

## 2025-02-11 ASSESSMENT — ENCOUNTER SYMPTOMS
ABDOMINAL PAIN: 0
EYE DISCHARGE: 0
CONSTITUTIONAL NEGATIVE: 1
CARDIOVASCULAR NEGATIVE: 1
ABDOMINAL DISTENTION: 0
RESPIRATORY NEGATIVE: 1
FEVER: 0
PALPITATIONS: 0
CHILLS: 0
NEUROLOGICAL NEGATIVE: 1
GASTROINTESTINAL NEGATIVE: 1
NUMBNESS: 0
JOINT SWELLING: 0
HEMATOLOGIC/LYMPHATIC NEGATIVE: 1
MUSCULOSKELETAL NEGATIVE: 1
CONSTIPATION: 0
HEADACHES: 0
CONFUSION: 0
SHORTNESS OF BREATH: 0
BACK PAIN: 0
ALLERGIC/IMMUNOLOGIC NEGATIVE: 1
DYSURIA: 0
WHEEZING: 0
DIARRHEA: 0
LIGHT-HEADEDNESS: 0
AGITATION: 0
NAUSEA: 0
PSYCHIATRIC NEGATIVE: 1
EYES NEGATIVE: 1
ENDOCRINE NEGATIVE: 1
VOMITING: 0
NECK STIFFNESS: 0
COUGH: 0
ADENOPATHY: 0

## 2025-02-11 NOTE — PROGRESS NOTES
Subjective   Patient ID: Jessi Alvarado is a 24 y.o. female who presents for Med Refill (Adderall pt co acne getting worse wanting referral and skin tag front of neck of her neck).  Co acne getting worse    Med refill    Skin tag on neck gets irritated with cloth    Med Refill  Pertinent negatives include no abdominal pain, chest pain, chills, congestion, coughing, fever, headaches, joint swelling, nausea, numbness, rash or vomiting.       Review of Systems   Constitutional: Negative.  Negative for chills and fever.   HENT: Negative.  Negative for congestion.    Eyes: Negative.  Negative for discharge.   Respiratory: Negative.  Negative for cough, shortness of breath and wheezing.    Cardiovascular: Negative.  Negative for chest pain, palpitations and leg swelling.   Gastrointestinal: Negative.  Negative for abdominal distention, abdominal pain, constipation, diarrhea, nausea and vomiting.   Endocrine: Negative.    Genitourinary: Negative.  Negative for dysuria and urgency.   Musculoskeletal: Negative.  Negative for back pain, joint swelling and neck stiffness.   Skin: Negative.  Negative for rash.   Allergic/Immunologic: Negative.  Negative for immunocompromised state.   Neurological: Negative.  Negative for light-headedness, numbness and headaches.   Hematological: Negative.  Negative for adenopathy.   Psychiatric/Behavioral: Negative.  Negative for agitation, behavioral problems and confusion.    All other systems reviewed and are negative.      Objective   Physical Exam  Vitals reviewed.   Constitutional:       General: She is not in acute distress.     Appearance: Normal appearance.   HENT:      Head: Normocephalic and atraumatic.      Nose: Nose normal.   Eyes:      Conjunctiva/sclera: Conjunctivae normal.      Pupils: Pupils are equal, round, and reactive to light.   Neck:      Vascular: No carotid bruit.   Cardiovascular:      Rate and Rhythm: Normal rate and regular rhythm.      Pulses: Normal pulses.  "     Heart sounds:      No gallop.   Pulmonary:      Effort: Pulmonary effort is normal. No respiratory distress.      Breath sounds: Normal breath sounds. No wheezing.   Abdominal:      General: Bowel sounds are normal.      Palpations: Abdomen is soft.      Tenderness: There is no abdominal tenderness.   Musculoskeletal:         General: Normal range of motion.      Cervical back: Normal range of motion. No rigidity.   Lymphadenopathy:      Cervical: No cervical adenopathy.   Skin:     General: Skin is warm.      Findings: No rash.      Comments: Moderate to severe cystic acne on face    Skin tag on neck , small, cryo done with liquid n   Neurological:      General: No focal deficit present.      Mental Status: She is alert and oriented to person, place, and time.   Psychiatric:         Mood and Affect: Mood normal.         Behavior: Behavior normal.       /77 (BP Location: Left arm, Patient Position: Sitting)   Pulse 101   Ht 1.702 m (5' 7\")   Wt 52.2 kg (115 lb)   BMI 18.01 kg/m²    Hemoglobin A1C   Date/Time Value Ref Range Status   09/08/2023 09:32 AM 5.4 % Final     Comment:          Diagnosis of Diabetes-Adults   Non-Diabetic: < or = 5.6%   Increased risk for developing diabetes: 5.7-6.4%   Diagnostic of diabetes: > or = 6.5%  .       Monitoring of Diabetes                Age (y)     Therapeutic Goal (%)   Adults:          >18           <7.0   Pediatrics:    13-18           <7.5                   7-12           <8.0                   0- 6            7.5-8.5   American Diabetes Association. Diabetes Care 33(S1), Jan 2010.     Assessment/Plan   Problem List Items Addressed This Visit       ADHD (attention deficit hyperactivity disorder)    Relevant Medications    amphetamine-dextroamphetamine XR (Adderall XR) 20 mg 24 hr capsule    Acne - Primary    Relevant Medications    doxycycline (Vibramycin) 100 mg capsule    Other Relevant Orders    Referral to Dermatology     Other Visit Diagnoses       Skin " tag [L91.8]                 OARRS HAS BEEN REVIEWED AND IS CONSISTENT WITH PRESCRIBED MEDICATIONS, CONSIDERED THE RISK OF ABUSE, DEPENDENCE, ADDICTION AND DIVERSION, MEDICATION IS FELT TO BE CLINICALLY APPROPRIATE ON THE DOCUMENTED DIAGNOSIS    Fu 1 mo

## 2025-02-25 ENCOUNTER — APPOINTMENT (OUTPATIENT)
Dept: PRIMARY CARE | Facility: CLINIC | Age: 25
End: 2025-02-25
Payer: COMMERCIAL

## 2025-03-09 ENCOUNTER — HOSPITAL ENCOUNTER (EMERGENCY)
Facility: HOSPITAL | Age: 25
Discharge: HOME | End: 2025-03-09
Payer: COMMERCIAL

## 2025-03-09 VITALS
HEIGHT: 67 IN | OXYGEN SATURATION: 98 % | SYSTOLIC BLOOD PRESSURE: 120 MMHG | DIASTOLIC BLOOD PRESSURE: 77 MMHG | HEART RATE: 83 BPM | RESPIRATION RATE: 20 BRPM | TEMPERATURE: 98.3 F | WEIGHT: 115 LBS | BODY MASS INDEX: 18.05 KG/M2

## 2025-03-09 DIAGNOSIS — H66.92 LEFT OTITIS MEDIA, UNSPECIFIED OTITIS MEDIA TYPE: Primary | ICD-10-CM

## 2025-03-09 PROCEDURE — 99283 EMERGENCY DEPT VISIT LOW MDM: CPT

## 2025-03-09 PROCEDURE — 2500000001 HC RX 250 WO HCPCS SELF ADMINISTERED DRUGS (ALT 637 FOR MEDICARE OP): Performed by: PHYSICIAN ASSISTANT

## 2025-03-09 RX ORDER — AMOXICILLIN 875 MG/1
875 TABLET, FILM COATED ORAL 2 TIMES DAILY
Qty: 14 TABLET | Refills: 0 | Status: SHIPPED | OUTPATIENT
Start: 2025-03-09 | End: 2025-03-16

## 2025-03-09 RX ORDER — AMOXICILLIN 250 MG/1
500 CAPSULE ORAL EVERY 8 HOURS SCHEDULED
Status: DISCONTINUED | OUTPATIENT
Start: 2025-03-09 | End: 2025-03-09 | Stop reason: HOSPADM

## 2025-03-09 RX ADMIN — AMOXICILLIN 500 MG: 250 CAPSULE ORAL at 21:12

## 2025-03-09 ASSESSMENT — PAIN SCALES - GENERAL: PAINLEVEL_OUTOF10: 8

## 2025-03-09 ASSESSMENT — PAIN - FUNCTIONAL ASSESSMENT: PAIN_FUNCTIONAL_ASSESSMENT: 0-10

## 2025-03-10 ENCOUNTER — APPOINTMENT (OUTPATIENT)
Dept: PRIMARY CARE | Facility: CLINIC | Age: 25
End: 2025-03-10
Payer: COMMERCIAL

## 2025-03-10 NOTE — ED PROVIDER NOTES
HPI   Chief Complaint   Patient presents with    Earache     Pain in left ear, feels full, crackling noises.       Presents with left ear ache that started prior to arrival.  States she has had URI type symptoms for the past few days.  Cough is gradually worsening.  Denies any fever or chills.  No drainage.  No self treatment.      History provided by:  Patient          Patient History   Past Medical History:   Diagnosis Date    Anxiety and depression     Asthma attack ([facility])     Attention-deficit hyperactivity disorder, predominantly hyperactive type     Attention deficit hyperactivity disorder (ADHD), predominantly hyperactive type    Concussion with no loss of consciousness 03/02/2018    Contusion of left hand 04/18/2023    Groin abscess 04/18/2023    Hand sprain, left, sequela 04/18/2023    Hematuria 04/18/2023    Left wrist pain 04/18/2023    Left wrist sprain, sequela 04/18/2023    Lump of skin 04/18/2023    Mitral valve prolapse     Numbness of fingers 04/18/2023    Other conditions influencing health status 09/23/2020    Menarche    Personal history of other diseases of the female genital tract     History of ovarian cyst    Personal history of other diseases of the respiratory system     History of asthma    PONV (postoperative nausea and vomiting)     Post-op pain 04/18/2023    Right knee pain 04/18/2023    Stiffness of right knee, not elsewhere classified 04/18/2023     Past Surgical History:   Procedure Laterality Date    FOREIGN BODY REMOVAL Right 06/20/2024    NEXPLANON REMOVAL/ DR BOLANOS    KNEE ARTHROSCOPY W/ MENISCECTOMY Right 10/14/2024    KNEE SURGERY      WISDOM TOOTH EXTRACTION       Family History   Problem Relation Name Age of Onset    No Known Problems Mother      ADD / ADHD Father      Cancer Maternal Grandmother      Hypertension Maternal Grandfather      IRMA disease Paternal Grandmother      Irritable bowel syndrome Paternal Grandmother      Hypertension Paternal Grandfather        Social History     Tobacco Use    Smoking status: Former     Types: Cigarettes     Passive exposure: Never    Smokeless tobacco: Never   Vaping Use    Vaping status: Every Day    Last attempt to quit: 1/1/2024    Substances: Nicotine    Devices: Refillable tank   Substance Use Topics    Alcohol use: Not Currently    Drug use: Never       Physical Exam   ED Triage Vitals   Temp Pulse Resp BP   -- -- -- --      SpO2 Temp src Heart Rate Source Patient Position   -- -- -- --      BP Location FiO2 (%)     -- --       Physical Exam  Vitals and nursing note reviewed.   Constitutional:       General: She is not in acute distress.     Appearance: Normal appearance. She is normal weight. She is not ill-appearing or toxic-appearing.   HENT:      Head: Normocephalic.      Right Ear: Tympanic membrane, ear canal and external ear normal.      Left Ear: Ear canal and external ear normal. Tympanic membrane is injected and bulging.      Nose: Nose normal.      Mouth/Throat:      Lips: Pink. No lesions.      Mouth: Mucous membranes are moist.      Pharynx: Oropharynx is clear. No oropharyngeal exudate.   Eyes:      General: No scleral icterus.     Conjunctiva/sclera: Conjunctivae normal.   Neck:      Meningeal: Kernig's sign absent.   Cardiovascular:      Rate and Rhythm: Normal rate and regular rhythm.      Heart sounds: Normal heart sounds.   Pulmonary:      Effort: Pulmonary effort is normal.      Breath sounds: Normal breath sounds and air entry.   Lymphadenopathy:      Cervical: No cervical adenopathy.   Skin:     General: Skin is warm.      Capillary Refill: Capillary refill takes less than 2 seconds.      Findings: No rash.   Neurological:      General: No focal deficit present.      Mental Status: She is alert and oriented to person, place, and time.      Cranial Nerves: No cranial nerve deficit or facial asymmetry.      Sensory: No sensory deficit.      Motor: No weakness.      Gait: Gait normal.   Psychiatric:          Attention and Perception: Attention and perception normal.         Mood and Affect: Mood and affect normal.         Speech: Speech normal.         Behavior: Behavior normal. Behavior is cooperative.         Thought Content: Thought content normal.         Cognition and Memory: Cognition and memory normal.         Judgment: Judgment normal.           ED Course & MDM   Diagnoses as of 03/09/25 2109   Left otitis media, unspecified otitis media type                 No data recorded                                 Medical Decision Making  Presents with left ear ache that started prior to arrival.  States she has had URI type symptoms for the past few days.  Cough is gradually worsening.  Denies any fever or chills.  No drainage.  No self treatment.    Ddx: URI, otitis, TMJ, cerumen impaction, other    Exam is consistent with otitis media.  Patient be started on amoxicillin.  First dose given in the ED.  She is encouraged use Tylenol Motrin for pain.  Follow-up with family care provider in the next few days.  Patient discharged home in improved stable condition      Risk  OTC drugs.  Prescription drug management.  Diagnosis or treatment significantly limited by social determinants of health.        Procedure  Procedures     Maritza Blanco PA-C  03/09/25 2109

## 2025-03-11 ENCOUNTER — APPOINTMENT (OUTPATIENT)
Dept: PRIMARY CARE | Facility: CLINIC | Age: 25
End: 2025-03-11
Payer: COMMERCIAL

## 2025-03-11 VITALS
SYSTOLIC BLOOD PRESSURE: 105 MMHG | WEIGHT: 114 LBS | BODY MASS INDEX: 17.89 KG/M2 | DIASTOLIC BLOOD PRESSURE: 68 MMHG | HEIGHT: 67 IN | HEART RATE: 99 BPM

## 2025-03-11 DIAGNOSIS — F90.2 ATTENTION DEFICIT HYPERACTIVITY DISORDER (ADHD), COMBINED TYPE: ICD-10-CM

## 2025-03-11 DIAGNOSIS — J30.89 NON-SEASONAL ALLERGIC RHINITIS, UNSPECIFIED TRIGGER: Primary | ICD-10-CM

## 2025-03-11 PROCEDURE — 99213 OFFICE O/P EST LOW 20 MIN: CPT | Performed by: INTERNAL MEDICINE

## 2025-03-11 PROCEDURE — 3008F BODY MASS INDEX DOCD: CPT | Performed by: INTERNAL MEDICINE

## 2025-03-11 PROCEDURE — 1036F TOBACCO NON-USER: CPT | Performed by: INTERNAL MEDICINE

## 2025-03-11 RX ORDER — LORATADINE 10 MG/1
10 TABLET ORAL DAILY
Qty: 30 TABLET | Refills: 11 | Status: SHIPPED | OUTPATIENT
Start: 2025-03-11 | End: 2026-03-11

## 2025-03-11 RX ORDER — DEXTROAMPHETAMINE SACCHARATE, AMPHETAMINE ASPARTATE MONOHYDRATE, DEXTROAMPHETAMINE SULFATE AND AMPHETAMINE SULFATE 5; 5; 5; 5 MG/1; MG/1; MG/1; MG/1
20 CAPSULE, EXTENDED RELEASE ORAL EVERY MORNING
Qty: 30 CAPSULE | Refills: 0 | Status: SHIPPED | OUTPATIENT
Start: 2025-03-11 | End: 2025-04-10

## 2025-03-11 ASSESSMENT — ENCOUNTER SYMPTOMS
COUGH: 0
ABDOMINAL DISTENTION: 0
CONFUSION: 0
ABDOMINAL PAIN: 0
CHILLS: 0
PSYCHIATRIC NEGATIVE: 1
WHEEZING: 0
ALLERGIC/IMMUNOLOGIC NEGATIVE: 1
SHORTNESS OF BREATH: 0
EYE DISCHARGE: 0
HEMATOLOGIC/LYMPHATIC NEGATIVE: 1
CONSTIPATION: 0
EYES NEGATIVE: 1
NEUROLOGICAL NEGATIVE: 1
NAUSEA: 0
RESPIRATORY NEGATIVE: 1
JOINT SWELLING: 0
ADENOPATHY: 0
GASTROINTESTINAL NEGATIVE: 1
DIARRHEA: 0
PALPITATIONS: 0
RHINORRHEA: 1
VOMITING: 0
MUSCULOSKELETAL NEGATIVE: 1
ENDOCRINE NEGATIVE: 1
AGITATION: 0
CONSTITUTIONAL NEGATIVE: 1
BACK PAIN: 0
CARDIOVASCULAR NEGATIVE: 1
DYSURIA: 0
LIGHT-HEADEDNESS: 0
HEADACHES: 0
NUMBNESS: 0
FEVER: 0
NECK STIFFNESS: 0

## 2025-03-11 NOTE — PROGRESS NOTES
Subjective   Patient ID: Jessi Alvarado is a 24 y.o. female who presents for Med Refill (Aderrall pt co sinus congestion with drainage).  Med refill  Adderalll effective  No side effect      Co sinus congestion and left ear ache x 3 days went to ER got abx(amoxil), ear feels a lot better now    Co frequent allergy stuffy bnose    Med Refill  Pertinent negatives include no abdominal pain, chest pain, chills, congestion, coughing, fever, headaches, joint swelling, nausea, numbness, rash or vomiting.       Review of Systems   Constitutional: Negative.  Negative for chills and fever.   HENT:  Positive for ear pain, postnasal drip and rhinorrhea. Negative for congestion.    Eyes: Negative.  Negative for discharge.   Respiratory: Negative.  Negative for cough, shortness of breath and wheezing.    Cardiovascular: Negative.  Negative for chest pain, palpitations and leg swelling.   Gastrointestinal: Negative.  Negative for abdominal distention, abdominal pain, constipation, diarrhea, nausea and vomiting.   Endocrine: Negative.    Genitourinary: Negative.  Negative for dysuria and urgency.   Musculoskeletal: Negative.  Negative for back pain, joint swelling and neck stiffness.   Skin: Negative.  Negative for rash.   Allergic/Immunologic: Negative.  Negative for immunocompromised state.   Neurological: Negative.  Negative for light-headedness, numbness and headaches.   Hematological: Negative.  Negative for adenopathy.   Psychiatric/Behavioral: Negative.  Negative for agitation, behavioral problems and confusion.    All other systems reviewed and are negative.      Objective   Physical Exam  Vitals reviewed.   Constitutional:       General: She is not in acute distress.     Appearance: Normal appearance.   HENT:      Head: Normocephalic and atraumatic.      Ears:      Comments: Left TM injected     Nose: Congestion and rhinorrhea present.   Eyes:      Conjunctiva/sclera: Conjunctivae normal.      Pupils: Pupils are  "equal, round, and reactive to light.   Neck:      Vascular: No carotid bruit.   Cardiovascular:      Rate and Rhythm: Normal rate and regular rhythm.      Pulses: Normal pulses.      Heart sounds:      No gallop.   Pulmonary:      Effort: Pulmonary effort is normal. No respiratory distress.      Breath sounds: Normal breath sounds. No wheezing.   Abdominal:      General: Bowel sounds are normal.      Palpations: Abdomen is soft.      Tenderness: There is no abdominal tenderness.   Musculoskeletal:         General: Normal range of motion.      Cervical back: Normal range of motion. No rigidity.   Lymphadenopathy:      Cervical: No cervical adenopathy.   Skin:     General: Skin is warm.      Findings: No rash.   Neurological:      General: No focal deficit present.      Mental Status: She is alert and oriented to person, place, and time.   Psychiatric:         Mood and Affect: Mood normal.         Behavior: Behavior normal.       /68 (BP Location: Right arm, Patient Position: Sitting)   Pulse 99   Ht 1.702 m (5' 7\")   Wt 51.7 kg (114 lb)   BMI 17.85 kg/m²    Hemoglobin A1C   Date/Time Value Ref Range Status   09/08/2023 09:32 AM 5.4 % Final     Comment:          Diagnosis of Diabetes-Adults   Non-Diabetic: < or = 5.6%   Increased risk for developing diabetes: 5.7-6.4%   Diagnostic of diabetes: > or = 6.5%  .       Monitoring of Diabetes                Age (y)     Therapeutic Goal (%)   Adults:          >18           <7.0   Pediatrics:    13-18           <7.5                   7-12           <8.0                   0- 6            7.5-8.5   American Diabetes Association. Diabetes Care 33(S1), Jan 2010.     Assessment/Plan   Problem List Items Addressed This Visit       ADHD (attention deficit hyperactivity disorder)    Relevant Medications    amphetamine-dextroamphetamine XR (Adderall XR) 20 mg 24 hr capsule     Other Visit Diagnoses       Non-seasonal allergic rhinitis, unspecified trigger    -  Primary    " Relevant Medications    loratadine (Claritin) 10 mg tablet             [facility] HAS BEEN REVIEWED AND IS CONSISTENT WITH PRESCRIBED MEDICATIONS, CONSIDERED THE RISK OF ABUSE, DEPENDENCE, ADDICTION AND DIVERSION, MEDICATION IS FELT TO BE CLINICALLY APPROPRIATE ON THE DOCUMENTED DIAGNOSIS    Utox and contract up to date    Finish amoxil coursew    Fu 1 mo

## 2025-03-22 ENCOUNTER — HOSPITAL ENCOUNTER (EMERGENCY)
Facility: HOSPITAL | Age: 25
Discharge: HOME | End: 2025-03-22
Attending: EMERGENCY MEDICINE
Payer: COMMERCIAL

## 2025-03-22 VITALS
BODY MASS INDEX: 18.05 KG/M2 | WEIGHT: 115 LBS | HEART RATE: 73 BPM | TEMPERATURE: 98 F | OXYGEN SATURATION: 100 % | SYSTOLIC BLOOD PRESSURE: 107 MMHG | RESPIRATION RATE: 16 BRPM | HEIGHT: 67 IN | DIASTOLIC BLOOD PRESSURE: 65 MMHG

## 2025-03-22 DIAGNOSIS — J02.0 STREP PHARYNGITIS: Primary | ICD-10-CM

## 2025-03-22 LAB
ALBUMIN SERPL BCP-MCNC: 4.2 G/DL (ref 3.4–5)
ALP SERPL-CCNC: 47 U/L (ref 33–110)
ALT SERPL W P-5'-P-CCNC: 7 U/L (ref 7–45)
ANION GAP SERPL CALC-SCNC: 9 MMOL/L (ref 10–20)
APPEARANCE UR: CLEAR
AST SERPL W P-5'-P-CCNC: 13 U/L (ref 9–39)
BILIRUB SERPL-MCNC: 0.4 MG/DL (ref 0–1.2)
BILIRUB UR STRIP.AUTO-MCNC: NEGATIVE MG/DL
BUN SERPL-MCNC: 11 MG/DL (ref 6–23)
CALCIUM SERPL-MCNC: 8.8 MG/DL (ref 8.6–10.3)
CHLORIDE SERPL-SCNC: 104 MMOL/L (ref 98–107)
CO2 SERPL-SCNC: 28 MMOL/L (ref 21–32)
COLOR UR: COLORLESS
CREAT SERPL-MCNC: 0.8 MG/DL (ref 0.5–1.05)
EGFRCR SERPLBLD CKD-EPI 2021: >90 ML/MIN/1.73M*2
ERYTHROCYTE [DISTWIDTH] IN BLOOD BY AUTOMATED COUNT: 11.5 % (ref 11.5–14.5)
FLUAV RNA RESP QL NAA+PROBE: NOT DETECTED
FLUBV RNA RESP QL NAA+PROBE: NOT DETECTED
GLUCOSE SERPL-MCNC: 116 MG/DL (ref 74–99)
GLUCOSE UR STRIP.AUTO-MCNC: NORMAL MG/DL
HCT VFR BLD AUTO: 35.6 % (ref 36–46)
HETEROPH AB SERPLBLD QL IA.RAPID: NEGATIVE
HGB BLD-MCNC: 11.5 G/DL (ref 12–16)
KETONES UR STRIP.AUTO-MCNC: NEGATIVE MG/DL
LEUKOCYTE ESTERASE UR QL STRIP.AUTO: NEGATIVE
MCH RBC QN AUTO: 30 PG (ref 26–34)
MCHC RBC AUTO-ENTMCNC: 32.3 G/DL (ref 32–36)
MCV RBC AUTO: 93 FL (ref 80–100)
NITRITE UR QL STRIP.AUTO: NEGATIVE
NRBC BLD-RTO: 0 /100 WBCS (ref 0–0)
PH UR STRIP.AUTO: 7 [PH]
PLATELET # BLD AUTO: 283 X10*3/UL (ref 150–450)
POTASSIUM SERPL-SCNC: 3.7 MMOL/L (ref 3.5–5.3)
PROT SERPL-MCNC: 6.5 G/DL (ref 6.4–8.2)
PROT UR STRIP.AUTO-MCNC: NEGATIVE MG/DL
RBC # BLD AUTO: 3.83 X10*6/UL (ref 4–5.2)
RBC # UR STRIP.AUTO: NEGATIVE MG/DL
S PYO DNA THROAT QL NAA+PROBE: DETECTED
SARS-COV-2 RNA RESP QL NAA+PROBE: NOT DETECTED
SODIUM SERPL-SCNC: 137 MMOL/L (ref 136–145)
SP GR UR STRIP.AUTO: 1.01
UROBILINOGEN UR STRIP.AUTO-MCNC: NORMAL MG/DL
WBC # BLD AUTO: 13.7 X10*3/UL (ref 4.4–11.3)

## 2025-03-22 PROCEDURE — 2500000005 HC RX 250 GENERAL PHARMACY W/O HCPCS: Performed by: PHYSICIAN ASSISTANT

## 2025-03-22 PROCEDURE — 81003 URINALYSIS AUTO W/O SCOPE: CPT | Performed by: PHYSICIAN ASSISTANT

## 2025-03-22 PROCEDURE — 99284 EMERGENCY DEPT VISIT MOD MDM: CPT | Mod: 25 | Performed by: EMERGENCY MEDICINE

## 2025-03-22 PROCEDURE — 96361 HYDRATE IV INFUSION ADD-ON: CPT

## 2025-03-22 PROCEDURE — 85027 COMPLETE CBC AUTOMATED: CPT | Performed by: PHYSICIAN ASSISTANT

## 2025-03-22 PROCEDURE — 96375 TX/PRO/DX INJ NEW DRUG ADDON: CPT

## 2025-03-22 PROCEDURE — 80053 COMPREHEN METABOLIC PANEL: CPT | Performed by: PHYSICIAN ASSISTANT

## 2025-03-22 PROCEDURE — 87077 CULTURE AEROBIC IDENTIFY: CPT | Mod: SAMLAB | Performed by: PHYSICIAN ASSISTANT

## 2025-03-22 PROCEDURE — 87636 SARSCOV2 & INF A&B AMP PRB: CPT | Performed by: PHYSICIAN ASSISTANT

## 2025-03-22 PROCEDURE — 86308 HETEROPHILE ANTIBODY SCREEN: CPT | Performed by: PHYSICIAN ASSISTANT

## 2025-03-22 PROCEDURE — 96365 THER/PROPH/DIAG IV INF INIT: CPT

## 2025-03-22 PROCEDURE — 2500000004 HC RX 250 GENERAL PHARMACY W/ HCPCS (ALT 636 FOR OP/ED): Performed by: PHYSICIAN ASSISTANT

## 2025-03-22 PROCEDURE — 36415 COLL VENOUS BLD VENIPUNCTURE: CPT | Performed by: PHYSICIAN ASSISTANT

## 2025-03-22 PROCEDURE — 87651 STREP A DNA AMP PROBE: CPT | Performed by: PHYSICIAN ASSISTANT

## 2025-03-22 RX ORDER — LIDOCAINE HYDROCHLORIDE 20 MG/ML
1.25 SOLUTION OROPHARYNGEAL ONCE
Status: COMPLETED | OUTPATIENT
Start: 2025-03-22 | End: 2025-03-22

## 2025-03-22 RX ORDER — ONDANSETRON HYDROCHLORIDE 2 MG/ML
4 INJECTION, SOLUTION INTRAVENOUS ONCE
Status: COMPLETED | OUTPATIENT
Start: 2025-03-22 | End: 2025-03-22

## 2025-03-22 RX ORDER — AZITHROMYCIN 250 MG/1
TABLET, FILM COATED ORAL
Qty: 6 TABLET | Refills: 0 | Status: SHIPPED | OUTPATIENT
Start: 2025-03-22 | End: 2025-03-24 | Stop reason: WASHOUT

## 2025-03-22 RX ORDER — DEXAMETHASONE SODIUM PHOSPHATE 10 MG/ML
10 INJECTION INTRAMUSCULAR; INTRAVENOUS ONCE
Status: COMPLETED | OUTPATIENT
Start: 2025-03-22 | End: 2025-03-22

## 2025-03-22 RX ADMIN — SODIUM CHLORIDE 1000 ML: 0.9 INJECTION, SOLUTION INTRAVENOUS at 16:47

## 2025-03-22 RX ADMIN — AZITHROMYCIN MONOHYDRATE 500 MG: 500 INJECTION, POWDER, LYOPHILIZED, FOR SOLUTION INTRAVENOUS at 17:53

## 2025-03-22 RX ADMIN — DEXAMETHASONE SODIUM PHOSPHATE 10 MG: 10 INJECTION, SOLUTION INTRAMUSCULAR; INTRAVENOUS at 17:54

## 2025-03-22 RX ADMIN — ONDANSETRON 4 MG: 2 INJECTION INTRAMUSCULAR; INTRAVENOUS at 19:04

## 2025-03-22 RX ADMIN — LIDOCAINE HYDROCHLORIDE 1.25 ML: 20 SOLUTION ORAL at 17:53

## 2025-03-22 ASSESSMENT — PAIN SCALES - GENERAL
PAINLEVEL_OUTOF10: 8
PAINLEVEL_OUTOF10: 2

## 2025-03-22 ASSESSMENT — PAIN - FUNCTIONAL ASSESSMENT: PAIN_FUNCTIONAL_ASSESSMENT: 0-10

## 2025-03-22 ASSESSMENT — PAIN DESCRIPTION - LOCATION: LOCATION: THROAT

## 2025-03-22 NOTE — Clinical Note
Jessi Alvarado was seen and treated in our emergency department on 3/22/2025.  She may return to work on 03/25/2025.  Patient may return sooner if improved     If you have any questions or concerns, please don't hesitate to call.      Maritza Blanco PA-C

## 2025-03-22 NOTE — ED PROVIDER NOTES
HPI   Chief Complaint   Patient presents with    Sore Throat     X1 day. Cough x2 wks. Denies fever. No nasal congestion       Presents with sore throat that started 1 to 2 days ago.  States she just finished amoxicillin for an ear infection.  States before that she had a cough and flulike symptoms.  States that she has been sick on and off for about a month now.  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  She does state there is numerous illnesses within the facility.  She is uncertain if she has been fevered but she has had chills.  She states it is very painful to swallow.  She states she is forcing herself to drink liquids because she has not been eating much but denies any nausea or vomiting.      History provided by:  Patient          Patient History   Past Medical History:   Diagnosis Date    Anxiety and depression     Asthma attack ([facility])     Attention-deficit hyperactivity disorder, predominantly hyperactive type     Attention deficit hyperactivity disorder (ADHD), predominantly hyperactive type    Concussion with no loss of consciousness 03/02/2018    Contusion of left hand 04/18/2023    Groin abscess 04/18/2023    Hand sprain, left, sequela 04/18/2023    Hematuria 04/18/2023    Left wrist pain 04/18/2023    Left wrist sprain, sequela 04/18/2023    Lump of skin 04/18/2023    Mitral valve prolapse     Numbness of fingers 04/18/2023    Other conditions influencing health status 09/23/2020    Menarche    Personal history of other diseases of the female genital tract     History of ovarian cyst    Personal history of other diseases of the respiratory system     History of asthma    PONV (postoperative nausea and vomiting)     Post-op pain 04/18/2023    Right knee pain 04/18/2023    Stiffness of right knee, not elsewhere classified 04/18/2023     Past Surgical History:   Procedure Laterality Date    FOREIGN BODY REMOVAL Right 06/20/2024    NEXPLANON REMOVAL/   LUIS MIGUEL    KNEE ARTHROSCOPY W/ MENISCECTOMY Right 10/14/2024    KNEE SURGERY      WISDOM TOOTH EXTRACTION       Family History   Problem Relation Name Age of Onset    No Known Problems Mother      ADD / ADHD Father      Cancer Maternal Grandmother      Hypertension Maternal Grandfather      IRMA disease Paternal Grandmother      Irritable bowel syndrome Paternal Grandmother      Hypertension Paternal Grandfather       Social History     Tobacco Use    Smoking status: Former     Types: Cigarettes     Passive exposure: Never    Smokeless tobacco: Never   Vaping Use    Vaping status: Every Day    Last attempt to quit: 1/1/2024    Substances: Nicotine    Devices: Refillable tank   Substance Use Topics    Alcohol use: Not Currently    Drug use: Never       Physical Exam   ED Triage Vitals [03/22/25 1640]   Temperature Heart Rate Respirations BP   36.7 °C (98 °F) 89 18 114/66      Pulse Ox Temp src Heart Rate Source Patient Position   100 % -- -- --      BP Location FiO2 (%)     -- --       Physical Exam  Vitals and nursing note reviewed.   Constitutional:       General: She is not in acute distress.     Appearance: Normal appearance. She is well-developed, well-groomed and normal weight. She is not ill-appearing or toxic-appearing.   HENT:      Head: Normocephalic.      Right Ear: Tympanic membrane, ear canal and external ear normal.      Left Ear: Tympanic membrane, ear canal and external ear normal.      Nose: Nose normal.      Mouth/Throat:      Lips: No lesions.      Mouth: Mucous membranes are moist. No oral lesions.      Palate: No mass and lesions.      Pharynx: Uvula midline. Posterior oropharyngeal erythema present. No oropharyngeal exudate or uvula swelling.      Tonsils: No tonsillar exudate or tonsillar abscesses.      Comments: Strawberry tongue present  Eyes:      General: Lids are normal.      Extraocular Movements: Extraocular movements intact.      Conjunctiva/sclera: Conjunctivae normal.      Pupils:  Pupils are equal, round, and reactive to light.   Neck:      Meningeal: Kernig's sign absent.   Cardiovascular:      Rate and Rhythm: Normal rate and regular rhythm.      Heart sounds: Normal heart sounds.   Pulmonary:      Effort: Pulmonary effort is normal.      Breath sounds: Normal breath sounds and air entry.   Musculoskeletal:      Cervical back: No spinous process tenderness or muscular tenderness.   Lymphadenopathy:      Cervical: Cervical adenopathy present.   Skin:     General: Skin is warm.      Capillary Refill: Capillary refill takes less than 2 seconds.   Neurological:      General: No focal deficit present.      Mental Status: She is alert and oriented to person, place, and time.      Cranial Nerves: Cranial nerves 2-12 are intact.      Gait: Gait is intact.   Psychiatric:         Attention and Perception: Attention and perception normal.         Mood and Affect: Mood and affect normal.         Speech: Speech normal.         Behavior: Behavior normal. Behavior is cooperative.         Thought Content: Thought content normal.         Cognition and Memory: Cognition and memory normal.         Judgment: Judgment normal.           ED Course & MDM   Diagnoses as of 03/22/25 1842   Strep pharyngitis                 No data recorded                                 Medical Decision Making  Presents with sore throat that started 1 to 2 days ago.  States she just finished amoxicillin for an ear infection.  States before that she had a cough and flulike symptoms.  States that she has been sick on and off for about a month now.  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  She does state there is numerous illnesses within the facility.  She is uncertain if she has been fevered but she has had chills.  She states it is very painful to swallow.  She states she is forcing herself to drink liquids because she has not been eating much but denies any nausea or vomiting.    Ddx:  Strep, COVID, mono, influenza, other    Will obtain labs  Patient given IV fluids and lidocaine for her sore throat.  Influenza was negative.  COVID was negative.  Mono was negative.  Strep was positive.  Patient's chemistries are within normal limits.  Urine was normal.  CBC does show a mild leukocytosis with a white count of 13.7.  Mild anemia with a hemoglobin of 11.5.  At this point I did discuss findings with the patient.  She could still have mono but her test was negative.  This is very common to have a delayed positive on the mono.  But I also did let the patient know that this could be simply multiple illnesses as she is in a new job at a new facility.  As patient was just recently on amoxicillin I did not feel that this was covering well for the strep therefore we will start azithromycin.  First dose given in the ED.  Patient also given Decadron to help with the pain.  She will be continued on Zithromax for home.  Continue Tylenol Motrin for pain.  Follow-up with family care provider in the next few days.  Patient discharged home in improved stable condition    Amount and/or Complexity of Data Reviewed  Labs: ordered. Decision-making details documented in ED Course.    Risk  OTC drugs.  Prescription drug management.  Diagnosis or treatment significantly limited by social determinants of health.        Procedure  Procedures     Maritza Blanco PA-C  03/22/25 0489

## 2025-03-24 ENCOUNTER — TELEPHONE (OUTPATIENT)
Dept: PHARMACY | Facility: HOSPITAL | Age: 25
End: 2025-03-24
Payer: COMMERCIAL

## 2025-03-24 DIAGNOSIS — B95.0 STREPTOCOCCAL INFECTION GROUP A: Primary | ICD-10-CM

## 2025-03-24 LAB — S PYO THROAT QL CULT: ABNORMAL

## 2025-03-24 RX ORDER — AMOXICILLIN 500 MG/1
500 CAPSULE ORAL EVERY 12 HOURS SCHEDULED
Qty: 20 CAPSULE | Refills: 0 | Status: SHIPPED | OUTPATIENT
Start: 2025-03-24 | End: 2025-04-03

## 2025-03-24 NOTE — TELEPHONE ENCOUNTER
EDPD Note: Unable to Reach    Number Contacted: Mobile    This was the EDPD Team first attempt to contact Mr./Mrs./Ms. Jessi Alvarado about their positive Strep A culture/result. Contact was unsuccessful. This is a Non-Life Threatening result and the patient is not aware of their results. Therefore, a second attempt will be made to reach the patient.    LVM - Patient presented in ED on 3/22 for sore throat x 2 days and flu like symptoms. She just finished amoxicillin for an ear infection. Patient was treated for Mono with azithromycin. Mono was negative so she may discontinue use. May start her on Amoxicillin 500 mg bid x 10 days.     If there are any other questions for the ED Post-Discharge Culture Follow Up Team, please contact [phone]. Fax: [phone].    Emma Warren RPh

## 2025-03-24 NOTE — PROGRESS NOTES
EDPD Note: Critical Lab    I was notified of a critical lab result. This showed the patient had a positive strep A culture/result. I was able to reach patient at this time. This patient is not being treated appropriately. Therefore, the patient was educated on new prescription for amoxicillin that was sent to their preferred pharmacy.    Patient presented in ED on 3/22 for sore throat x 2 days and flu like symptoms. She just finished amoxicillin for an ear infection. Patient was discharged with azithromycin. PCN is the preferred therapy for Strep A and the dosing is incorrect based on  Guideline for Strep. Will have her stop azithromycin and start start Amoxicillin 500 mg bid x 10 days since she has no PCN allergy.     ADDENDUM: Patient has only gotten 1 dose of azithromycin. Advised patient to stop zithromax and start on amoxcillin 500 mg BID x 10 days. Patient expressed understanding. Patient is to follow up with PCP if further evaluation is needed. Patient expressed understanding. Script was sent to [facility]    Susceptibility data from last 90 days.  Collected Specimen Info Organism   03/22/25 Swab from Throat/Pharynx Streptococcus pyogenes (Group A Streptococcus)       If there are any other questions for the ED Post-Discharge Culture Follow Up Team, please contact [phone]. Fax: [phone].    Emma Warren RPh

## 2025-04-15 ENCOUNTER — APPOINTMENT (OUTPATIENT)
Dept: PRIMARY CARE | Facility: CLINIC | Age: 25
End: 2025-04-15
Payer: COMMERCIAL

## 2025-04-22 ENCOUNTER — HOSPITAL ENCOUNTER (OUTPATIENT)
Dept: RADIOLOGY | Facility: EXTERNAL LOCATION | Age: 25
Discharge: HOME | End: 2025-04-22

## 2025-04-22 ENCOUNTER — APPOINTMENT (OUTPATIENT)
Dept: ORTHOPEDIC SURGERY | Facility: CLINIC | Age: 25
End: 2025-04-22
Payer: COMMERCIAL

## 2025-06-03 ENCOUNTER — APPOINTMENT (OUTPATIENT)
Dept: PRIMARY CARE | Facility: CLINIC | Age: 25
End: 2025-06-03
Payer: COMMERCIAL

## 2025-06-16 ENCOUNTER — APPOINTMENT (OUTPATIENT)
Dept: PRIMARY CARE | Facility: CLINIC | Age: 25
End: 2025-06-16
Payer: COMMERCIAL

## 2025-06-27 ENCOUNTER — APPOINTMENT (OUTPATIENT)
Dept: OBSTETRICS AND GYNECOLOGY | Facility: CLINIC | Age: 25
End: 2025-06-27
Payer: COMMERCIAL

## 2025-07-03 ENCOUNTER — APPOINTMENT (OUTPATIENT)
Facility: CLINIC | Age: 25
End: 2025-07-03
Payer: COMMERCIAL

## 2025-07-03 DIAGNOSIS — Z00.00 ROUTINE ADULT HEALTH MAINTENANCE: Primary | ICD-10-CM

## 2025-07-03 DIAGNOSIS — Z11.3 SCREENING FOR STD (SEXUALLY TRANSMITTED DISEASE): ICD-10-CM

## 2025-07-03 PROCEDURE — 99395 PREV VISIT EST AGE 18-39: CPT | Performed by: REGISTERED NURSE

## 2025-07-03 PROCEDURE — 1036F TOBACCO NON-USER: CPT | Performed by: REGISTERED NURSE

## 2025-07-03 ASSESSMENT — ANXIETY QUESTIONNAIRES
4. TROUBLE RELAXING: NOT AT ALL
6. BECOMING EASILY ANNOYED OR IRRITABLE: NOT AT ALL
3. WORRYING TOO MUCH ABOUT DIFFERENT THINGS: NOT AT ALL
2. NOT BEING ABLE TO STOP OR CONTROL WORRYING: NOT AT ALL
5. BEING SO RESTLESS THAT IT IS HARD TO SIT STILL: NOT AT ALL
7. FEELING AFRAID AS IF SOMETHING AWFUL MIGHT HAPPEN: NOT AT ALL
IF YOU CHECKED OFF ANY PROBLEMS ON THIS QUESTIONNAIRE, HOW DIFFICULT HAVE THESE PROBLEMS MADE IT FOR YOU TO DO YOUR WORK, TAKE CARE OF THINGS AT HOME, OR GET ALONG WITH OTHER PEOPLE: NOT DIFFICULT AT ALL
1. FEELING NERVOUS, ANXIOUS, OR ON EDGE: NOT AT ALL
GAD7 TOTAL SCORE: 0

## 2025-07-03 ASSESSMENT — LIFESTYLE VARIABLES
HOW OFTEN DO YOU HAVE A DRINK CONTAINING ALCOHOL: NEVER
SKIP TO QUESTIONS 9-10: 1
AUDIT-C TOTAL SCORE: 0
HOW MANY STANDARD DRINKS CONTAINING ALCOHOL DO YOU HAVE ON A TYPICAL DAY: PATIENT DOES NOT DRINK
HOW OFTEN DO YOU HAVE SIX OR MORE DRINKS ON ONE OCCASION: NEVER

## 2025-07-03 ASSESSMENT — SOCIAL DETERMINANTS OF HEALTH (SDOH)
WITHIN THE LAST YEAR, HAVE YOU BEEN HUMILIATED OR EMOTIONALLY ABUSED IN OTHER WAYS BY YOUR PARTNER OR EX-PARTNER?: NO
WITHIN THE LAST YEAR, HAVE YOU BEEN KICKED, HIT, SLAPPED, OR OTHERWISE PHYSICALLY HURT BY YOUR PARTNER OR EX-PARTNER?: NO
WITHIN THE LAST YEAR, HAVE YOU BEEN AFRAID OF YOUR PARTNER OR EX-PARTNER?: NO
WITHIN THE LAST YEAR, HAVE TO BEEN RAPED OR FORCED TO HAVE ANY KIND OF SEXUAL ACTIVITY BY YOUR PARTNER OR EX-PARTNER?: NO

## 2025-07-03 ASSESSMENT — ENCOUNTER SYMPTOMS
PSYCHIATRIC NEGATIVE: 1
CONSTITUTIONAL NEGATIVE: 1

## 2025-07-03 NOTE — PROGRESS NOTES
Subjective   Patient ID: Jessi Alvarado is a 25 y.o. female who presents for Gynecologic Exam (Pt here for yearly exam. ).  Gynecologic Exam      Pt. Presents for annual exam. Pap up to date. Does not desire any contraception or menstrual mgmt at this time. Desires STI screening. No other complaints or concerns  Review of Systems   Constitutional: Negative.    Genitourinary: Negative.    Psychiatric/Behavioral: Negative.         Objective   Physical Exam  Constitutional:       Appearance: Normal appearance.   Pulmonary:      Effort: Pulmonary effort is normal.   Neurological:      General: No focal deficit present.      Mental Status: She is alert and oriented to person, place, and time.   Psychiatric:         Mood and Affect: Mood normal.         Behavior: Behavior normal.         Thought Content: Thought content normal.         Judgment: Judgment normal.         Assessment/Plan        GC/CT/trich  RTO annual and PRN    Yoly Logan, LAY-CNSHAYE, LAY-CNP, DNP 07/03/25 2:38 PM

## 2025-07-05 LAB
C TRACH RRNA SPEC QL NAA+PROBE: NOT DETECTED
N GONORRHOEA RRNA SPEC QL NAA+PROBE: NOT DETECTED
QUEST GC CT AMPLIFIED (ALWAYS MESSAGE): NORMAL
T VAGINALIS RRNA SPEC QL NAA+PROBE: NOT DETECTED

## 2025-09-10 ENCOUNTER — APPOINTMENT (OUTPATIENT)
Dept: PRIMARY CARE | Facility: CLINIC | Age: 25
End: 2025-09-10
Payer: COMMERCIAL

## (undated) DEVICE — CIRCUIT, BREATHING, ADULT, B/V FILTER, HMEF, 3 L BAG, 108 IN

## (undated) DEVICE — DRESSING, TRANSPARENT, TEGADERM, 4 X 4-3/4 IN, NO LABEL

## (undated) DEVICE — SUTURE, MONOCRYL, 4-0, 18 IN, PS2, UNDYED

## (undated) DEVICE — SYRINGE, 10 ML, 21 G X 1 0.5 IN, LUER LOK

## (undated) DEVICE — STRAP, ARMBOARD, 3IN, BLUE/WHITE, SECURE HOOK

## (undated) DEVICE — DRESSING, TRANSPARENT, TEGADERM, 2-3/8 X 2-3/4 IN

## (undated) DEVICE — DRESSING, ABDOMINAL, TENDERSORB, 8 X 7-1/2 IN, STERILE

## (undated) DEVICE — TUBING, SUCTION, NON-CONDUCTIVE, FEMALE, 6.4MM X 10, STERILE

## (undated) DEVICE — GLOVE, PROTEXIS PI CLASSIC, SZ-8.0, PF, PF, LF

## (undated) DEVICE — STRAP, KNEE AND BODY, SINGLE USE

## (undated) DEVICE — DRESSING, GAUZE, SPONGE, 12 PLY, CURITY, 4 X 4 IN, STERILE

## (undated) DEVICE — DRESSING, GAUZE, PETROLATUM, PATCH, XEROFORM, 1 X 8 IN, STERILE

## (undated) DEVICE — PROBE, APOLLO RF, 90 DEG, EXTRA LARTGE

## (undated) DEVICE — SUTURE, VICRYL, 3-0, 27 IN, SH

## (undated) DEVICE — Device

## (undated) DEVICE — CUFF, TOURNIQUET, 34 X 4, DUAL PORT/SNGL BLADDER, DISP, LF

## (undated) DEVICE — MAT, QUICK WICK, FLUID ABSORPTION, 40X30

## (undated) DEVICE — SYRINGE, HYPODERMIC, CONTROL, LUER LOCK, 10 CC, PLASTIC, STERILE

## (undated) DEVICE — BANDAGE, SHUR-BAND, 6 X 10YDS, LF

## (undated) DEVICE — STAPLER, SKIN, ROTATING HEAD, PROXIMATE, WIDE, 35

## (undated) DEVICE — GOWN, STANDARD, ULTRA, X-LARGE, LF

## (undated) DEVICE — APPLICATOR, CHLORAPREP, W/ORANGE TINT, 26ML

## (undated) DEVICE — STRIP, SKIN CLOSURE, STERI STRIP, REINFORCED, 0.5 X 4 IN

## (undated) DEVICE — BLADE, STRYKER, 4.0MM, ANGLED, AGGRESSIVE PLUS

## (undated) DEVICE — GLOVE, PROTEXIS PI CLASSIC, SZ-6.0, PF, LF

## (undated) DEVICE — GLOVE, SURGICAL, PROTEXIS PI BLUE W/NEUTHERA, 8.5, PF, LF

## (undated) DEVICE — DRESSING, GAUZE, SPONGE, 12 PLY, CURITY, 4 X 4 IN, RIGID TRAY, STERILE

## (undated) DEVICE — NEEDLE, SPINAL, QUINCKE, 18 G X 3.5 IN, PINK HUB

## (undated) DEVICE — SOLUTION, IRRIGATION, SODIUM CHLORIDE 0.9%, 1000 ML, POUR BOTTLE

## (undated) DEVICE — TOWEL, OR, XRAY DECTECTABLE, 17 X 27, BLUE, 4/PK, STERILE

## (undated) DEVICE — ICEMAN SYS, COLD THERAPY, CLEAR3 CUBE, UNIV WRAP, REG HOSE

## (undated) DEVICE — COVER, MAYO STAND, 23-1/2 X 56, LF

## (undated) DEVICE — DRAPE, SURGICAL, 100 X 124 X 76

## (undated) DEVICE — DRAPE, C-ARM, FLUROSCAN, MINI

## (undated) DEVICE — DRESSING, GAUZE, SPONGE, 8 PLY, CURITY, 2 X 2 IN, STERILE

## (undated) DEVICE — NEEDLE, ECLIPSE, 25GA X 1-1/2 IN

## (undated) DEVICE — SOLUTION, IRRI GATION, LACTATED RINGERS, 3000 ML, BAG

## (undated) DEVICE — BLANKET, HYPERTHERMIA, UPPER BODY

## (undated) DEVICE — LINE, GAS SAMPLING, .05IN 1D 10FT, M/M CONNECTORS

## (undated) DEVICE — TUBING, CASSETTE, CROSSFLOW INFLOW

## (undated) DEVICE — ADHESIVE, SKIN, LIQUIBAND EXCEED